# Patient Record
Sex: FEMALE | Race: WHITE | NOT HISPANIC OR LATINO | Employment: FULL TIME | URBAN - METROPOLITAN AREA
[De-identification: names, ages, dates, MRNs, and addresses within clinical notes are randomized per-mention and may not be internally consistent; named-entity substitution may affect disease eponyms.]

---

## 2023-10-19 ENCOUNTER — APPOINTMENT (OUTPATIENT)
Dept: CARDIOLOGY | Facility: CLINIC | Age: 53
End: 2023-10-19
Payer: COMMERCIAL

## 2023-11-16 ENCOUNTER — APPOINTMENT (OUTPATIENT)
Dept: CARDIOLOGY | Facility: CLINIC | Age: 53
End: 2023-11-16
Payer: COMMERCIAL

## 2023-12-22 ENCOUNTER — APPOINTMENT (OUTPATIENT)
Dept: PRIMARY CARE | Facility: CLINIC | Age: 53
End: 2023-12-22
Payer: COMMERCIAL

## 2023-12-28 ENCOUNTER — APPOINTMENT (OUTPATIENT)
Dept: CARDIOLOGY | Facility: CLINIC | Age: 53
End: 2023-12-28
Payer: COMMERCIAL

## 2024-01-02 ENCOUNTER — APPOINTMENT (OUTPATIENT)
Dept: CARDIOLOGY | Facility: CLINIC | Age: 54
End: 2024-01-02
Payer: COMMERCIAL

## 2024-01-25 PROBLEM — E53.8 B12 DEFICIENCY: Status: ACTIVE | Noted: 2024-01-25

## 2024-01-25 PROBLEM — E61.1 IRON DEFICIENCY: Status: ACTIVE | Noted: 2024-01-25

## 2024-01-25 PROBLEM — S69.92XA INJURY OF LEFT WRIST: Status: ACTIVE | Noted: 2024-01-25

## 2024-01-25 PROBLEM — E66.01 MORBID OBESITY (MULTI): Status: ACTIVE | Noted: 2024-01-25

## 2024-01-25 PROBLEM — J20.9 BRONCHITIS, ACUTE: Status: ACTIVE | Noted: 2024-01-25

## 2024-01-25 PROBLEM — U07.1 COVID-19: Status: ACTIVE | Noted: 2024-01-25

## 2024-01-25 PROBLEM — S93.601A RIGHT FOOT SPRAIN: Status: ACTIVE | Noted: 2024-01-25
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
## 2024-01-25 PROBLEM — K91.89 COMPLICATIONS OF GASTRIC BYPASS SURGERY: Status: ACTIVE | Noted: 2024-01-25

## 2024-01-25 PROBLEM — S63.501A RIGHT WRIST SPRAIN: Status: ACTIVE | Noted: 2024-01-25

## 2024-01-25 PROBLEM — R60.0 BILATERAL LEG EDEMA: Status: ACTIVE | Noted: 2024-01-25

## 2024-01-25 PROBLEM — D64.9 ANEMIA: Status: ACTIVE | Noted: 2024-01-25

## 2024-01-25 PROBLEM — R09.89 CAROTID BRUIT: Status: ACTIVE | Noted: 2024-01-25

## 2024-01-25 PROBLEM — R53.83 FATIGUE: Status: ACTIVE | Noted: 2024-01-25

## 2024-01-25 PROBLEM — J30.9 ALLERGIC RHINITIS: Status: ACTIVE | Noted: 2024-01-25
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
## 2024-01-25 PROBLEM — S70.219A: Status: ACTIVE | Noted: 2024-01-25

## 2024-01-25 PROBLEM — I10 ESSENTIAL HYPERTENSION, BENIGN: Status: ACTIVE | Noted: 2024-01-25

## 2024-01-25 RX ORDER — IBUPROFEN 200 MG
200 TABLET ORAL EVERY 6 HOURS PRN
COMMUNITY
End: 2024-02-26 | Stop reason: WASHOUT

## 2024-01-25 RX ORDER — CETIRIZINE HYDROCHLORIDE 10 MG/1
TABLET ORAL
COMMUNITY

## 2024-01-25 RX ORDER — EPINEPHRINE 0.3 MG/.3ML
0.3 INJECTION INTRAMUSCULAR
COMMUNITY
Start: 2015-08-13

## 2024-01-25 RX ORDER — MULTIVITAMIN
TABLET ORAL
COMMUNITY
Start: 2009-10-02

## 2024-01-25 RX ORDER — HYDROCHLOROTHIAZIDE 12.5 MG/1
12.5 TABLET ORAL
COMMUNITY
Start: 2024-01-02 | End: 2024-02-26 | Stop reason: SDUPTHER

## 2024-01-25 RX ORDER — LANOLIN ALCOHOL/MO/W.PET/CERES
CREAM (GRAM) TOPICAL
COMMUNITY

## 2024-01-25 RX ORDER — ZINC SULFATE 50(220)MG
1 CAPSULE ORAL DAILY
COMMUNITY

## 2024-01-25 RX ORDER — VITAMIN B COMPLEX
CAPSULE ORAL
COMMUNITY
End: 2024-02-26 | Stop reason: WASHOUT

## 2024-01-25 RX ORDER — MELOXICAM 15 MG/1
15 TABLET ORAL DAILY
COMMUNITY
Start: 2023-06-20 | End: 2024-02-26 | Stop reason: WASHOUT

## 2024-01-26 ENCOUNTER — APPOINTMENT (OUTPATIENT)
Dept: PRIMARY CARE | Facility: CLINIC | Age: 54
End: 2024-01-26
Payer: COMMERCIAL

## 2024-02-01 ENCOUNTER — APPOINTMENT (OUTPATIENT)
Dept: CARDIOLOGY | Facility: CLINIC | Age: 54
End: 2024-02-01
Payer: COMMERCIAL

## 2024-02-23 RX ORDER — METFORMIN HYDROCHLORIDE 500 MG/1
TABLET ORAL
COMMUNITY
Start: 2024-01-15 | End: 2024-02-26 | Stop reason: WASHOUT

## 2024-02-26 ENCOUNTER — OFFICE VISIT (OUTPATIENT)
Dept: PRIMARY CARE | Facility: CLINIC | Age: 54
End: 2024-02-26
Payer: COMMERCIAL

## 2024-02-26 ENCOUNTER — OFFICE VISIT (OUTPATIENT)
Dept: CARDIOLOGY | Facility: CLINIC | Age: 54
End: 2024-02-26
Payer: COMMERCIAL

## 2024-02-26 VITALS
BODY MASS INDEX: 53.92 KG/M2 | TEMPERATURE: 97.9 F | WEIGHT: 293 LBS | DIASTOLIC BLOOD PRESSURE: 90 MMHG | HEART RATE: 68 BPM | HEIGHT: 62 IN | SYSTOLIC BLOOD PRESSURE: 160 MMHG

## 2024-02-26 VITALS
TEMPERATURE: 97.9 F | BODY MASS INDEX: 51.91 KG/M2 | SYSTOLIC BLOOD PRESSURE: 160 MMHG | DIASTOLIC BLOOD PRESSURE: 90 MMHG | HEIGHT: 63 IN | WEIGHT: 293 LBS

## 2024-02-26 DIAGNOSIS — E55.9 VITAMIN D DEFICIENCY: ICD-10-CM

## 2024-02-26 DIAGNOSIS — Z00.00 ROUTINE GENERAL MEDICAL EXAMINATION AT A HEALTH CARE FACILITY: Primary | ICD-10-CM

## 2024-02-26 DIAGNOSIS — D50.9 IRON DEFICIENCY ANEMIA, UNSPECIFIED IRON DEFICIENCY ANEMIA TYPE: ICD-10-CM

## 2024-02-26 DIAGNOSIS — Z98.84 H/O GASTRIC BYPASS: ICD-10-CM

## 2024-02-26 DIAGNOSIS — R73.9 HYPERGLYCEMIA: ICD-10-CM

## 2024-02-26 DIAGNOSIS — E53.8 VITAMIN B12 DEFICIENCY: ICD-10-CM

## 2024-02-26 DIAGNOSIS — I10 ESSENTIAL HYPERTENSION, BENIGN: ICD-10-CM

## 2024-02-26 DIAGNOSIS — I10 ESSENTIAL HYPERTENSION, BENIGN: Primary | ICD-10-CM

## 2024-02-26 DIAGNOSIS — R74.8 ELEVATED LIVER ENZYMES: ICD-10-CM

## 2024-02-26 PROCEDURE — 99214 OFFICE O/P EST MOD 30 MIN: CPT | Performed by: INTERNAL MEDICINE

## 2024-02-26 PROCEDURE — 3077F SYST BP >= 140 MM HG: CPT | Performed by: FAMILY MEDICINE

## 2024-02-26 PROCEDURE — 1036F TOBACCO NON-USER: CPT | Performed by: FAMILY MEDICINE

## 2024-02-26 PROCEDURE — 99396 PREV VISIT EST AGE 40-64: CPT | Performed by: FAMILY MEDICINE

## 2024-02-26 PROCEDURE — 3080F DIAST BP >= 90 MM HG: CPT | Performed by: FAMILY MEDICINE

## 2024-02-26 PROCEDURE — 3077F SYST BP >= 140 MM HG: CPT | Performed by: INTERNAL MEDICINE

## 2024-02-26 PROCEDURE — 3080F DIAST BP >= 90 MM HG: CPT | Performed by: INTERNAL MEDICINE

## 2024-02-26 PROCEDURE — 1036F TOBACCO NON-USER: CPT | Performed by: INTERNAL MEDICINE

## 2024-02-26 RX ORDER — HYDROCHLOROTHIAZIDE 12.5 MG/1
12.5 TABLET ORAL
Qty: 90 TABLET | Refills: 3 | Status: SHIPPED | OUTPATIENT
Start: 2024-02-26 | End: 2025-02-25

## 2024-02-26 ASSESSMENT — PAIN SCALES - GENERAL: PAINLEVEL: 0-NO PAIN

## 2024-02-26 ASSESSMENT — PATIENT HEALTH QUESTIONNAIRE - PHQ9
1. LITTLE INTEREST OR PLEASURE IN DOING THINGS: NOT AT ALL
SUM OF ALL RESPONSES TO PHQ9 QUESTIONS 1 AND 2: 0
2. FEELING DOWN, DEPRESSED OR HOPELESS: NOT AT ALL
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
## 2024-02-26 NOTE — PROGRESS NOTES
1. Essential hypertension  2. Morbid obesity BMI 66  3. Sinus tachycardia in the setting of severe anemia  4. 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  She has no complaints of orthopnea PND or lower extremity edema    BMI of 68.7, blood pressure today by the /90 mmHg repeat blood pressure by me at 140/94 mmHg  Morbidly obese pleasant female in no acute distress speaking full sentences no carotid bruits upstroke and volumes are normal, heart rate and rhythm are regular grade 3/6 systolic murmur best left upper sternal border lungs decreased breath sound but clear, abdomen is morbidly obese positive bowel sounds soft and nontender and the lower extremities have no edema    No new labs are available    Hypertension with poor control today she is under more stressful life events because of her special needs child, blood pressure previously 136/80 mmHg followed by 118/88 mmHg and today blood pressure today by me at 140/94 mmHg, patient is recommended to continue with her current medication patient was recommended to continue obtaining blood pressure readings at home, and to call the office in 2-3 weeks for follow-up, the salt reduction in the diet was extensively discussed heart healthy diet and weight loss was discussed and recommended patient is to return to my clinic in 3 months for reevaluation.

## 2024-03-29 ENCOUNTER — APPOINTMENT (OUTPATIENT)
Dept: PRIMARY CARE | Facility: CLINIC | Age: 54
End: 2024-03-29
Payer: COMMERCIAL

## 2024-04-22 ENCOUNTER — LAB (OUTPATIENT)
Dept: LAB | Facility: LAB | Age: 54
End: 2024-04-22
Payer: COMMERCIAL

## 2024-04-22 DIAGNOSIS — Z98.84 H/O GASTRIC BYPASS: ICD-10-CM

## 2024-04-22 DIAGNOSIS — R73.9 HYPERGLYCEMIA: ICD-10-CM

## 2024-04-22 DIAGNOSIS — E53.8 VITAMIN B12 DEFICIENCY: ICD-10-CM

## 2024-04-22 DIAGNOSIS — E55.9 VITAMIN D DEFICIENCY: ICD-10-CM

## 2024-04-22 DIAGNOSIS — D50.9 IRON DEFICIENCY ANEMIA, UNSPECIFIED IRON DEFICIENCY ANEMIA TYPE: ICD-10-CM

## 2024-04-22 DIAGNOSIS — I10 ESSENTIAL HYPERTENSION, BENIGN: ICD-10-CM

## 2024-04-22 LAB
25(OH)D3 SERPL-MCNC: 10 NG/ML (ref 30–100)
ALBUMIN SERPL BCP-MCNC: 4.2 G/DL (ref 3.4–5)
ALP SERPL-CCNC: 86 U/L (ref 33–110)
ALT SERPL W P-5'-P-CCNC: 79 U/L (ref 7–45)
ANION GAP SERPL CALC-SCNC: 14 MMOL/L (ref 10–20)
AST SERPL W P-5'-P-CCNC: 35 U/L (ref 9–39)
BASOPHILS # BLD AUTO: 0.04 X10*3/UL (ref 0–0.1)
BASOPHILS NFR BLD AUTO: 0.5 %
BILIRUB SERPL-MCNC: 1.1 MG/DL (ref 0–1.2)
BUN SERPL-MCNC: 19 MG/DL (ref 6–23)
CALCIUM SERPL-MCNC: 9.4 MG/DL (ref 8.6–10.3)
CHLORIDE SERPL-SCNC: 101 MMOL/L (ref 98–107)
CHOLEST SERPL-MCNC: 166 MG/DL (ref 0–199)
CHOLESTEROL/HDL RATIO: 2.6
CO2 SERPL-SCNC: 30 MMOL/L (ref 21–32)
CREAT SERPL-MCNC: 0.54 MG/DL (ref 0.5–1.05)
EGFRCR SERPLBLD CKD-EPI 2021: >90 ML/MIN/1.73M*2
EOSINOPHIL # BLD AUTO: 0.1 X10*3/UL (ref 0–0.7)
EOSINOPHIL NFR BLD AUTO: 1.3 %
ERYTHROCYTE [DISTWIDTH] IN BLOOD BY AUTOMATED COUNT: 13.4 % (ref 11.5–14.5)
EST. AVERAGE GLUCOSE BLD GHB EST-MCNC: 94 MG/DL
FERRITIN SERPL-MCNC: 66 NG/ML (ref 8–150)
GLUCOSE SERPL-MCNC: 97 MG/DL (ref 74–99)
HBA1C MFR BLD: 4.9 %
HCT VFR BLD AUTO: 45.6 % (ref 36–46)
HDLC SERPL-MCNC: 63.6 MG/DL
HGB BLD-MCNC: 15.3 G/DL (ref 12–16)
IMM GRANULOCYTES # BLD AUTO: 0.03 X10*3/UL (ref 0–0.7)
IMM GRANULOCYTES NFR BLD AUTO: 0.4 % (ref 0–0.9)
INSULIN SERPL-ACNC: 14 UIU/ML (ref 3–25)
IRON SATN MFR SERPL: 29 % (ref 25–45)
IRON SERPL-MCNC: 132 UG/DL (ref 35–150)
LDLC SERPL CALC-MCNC: 87 MG/DL
LYMPHOCYTES # BLD AUTO: 3.11 X10*3/UL (ref 1.2–4.8)
LYMPHOCYTES NFR BLD AUTO: 40 %
MCH RBC QN AUTO: 31.9 PG (ref 26–34)
MCHC RBC AUTO-ENTMCNC: 33.6 G/DL (ref 32–36)
MCV RBC AUTO: 95 FL (ref 80–100)
MONOCYTES # BLD AUTO: 0.57 X10*3/UL (ref 0.1–1)
MONOCYTES NFR BLD AUTO: 7.3 %
NEUTROPHILS # BLD AUTO: 3.92 X10*3/UL (ref 1.2–7.7)
NEUTROPHILS NFR BLD AUTO: 50.5 %
NON HDL CHOLESTEROL: 102 MG/DL (ref 0–149)
NRBC BLD-RTO: 0 /100 WBCS (ref 0–0)
PLATELET # BLD AUTO: 259 X10*3/UL (ref 150–450)
POTASSIUM SERPL-SCNC: 3.8 MMOL/L (ref 3.5–5.3)
PROT SERPL-MCNC: 7.2 G/DL (ref 6.4–8.2)
RBC # BLD AUTO: 4.79 X10*6/UL (ref 4–5.2)
SODIUM SERPL-SCNC: 141 MMOL/L (ref 136–145)
TIBC SERPL-MCNC: 459 UG/DL (ref 240–445)
TRIGL SERPL-MCNC: 77 MG/DL (ref 0–149)
TSH SERPL-ACNC: 2.89 MIU/L (ref 0.44–3.98)
UIBC SERPL-MCNC: 327 UG/DL (ref 110–370)
VIT B12 SERPL-MCNC: 662 PG/ML (ref 211–911)
VLDL: 15 MG/DL (ref 0–40)
WBC # BLD AUTO: 7.8 X10*3/UL (ref 4.4–11.3)

## 2024-04-22 PROCEDURE — 83525 ASSAY OF INSULIN: CPT

## 2024-04-22 PROCEDURE — 82306 VITAMIN D 25 HYDROXY: CPT

## 2024-04-22 PROCEDURE — 80061 LIPID PANEL: CPT

## 2024-04-22 PROCEDURE — 82607 VITAMIN B-12: CPT

## 2024-04-22 PROCEDURE — 83036 HEMOGLOBIN GLYCOSYLATED A1C: CPT

## 2024-04-22 PROCEDURE — 36415 COLL VENOUS BLD VENIPUNCTURE: CPT

## 2024-04-22 PROCEDURE — 83550 IRON BINDING TEST: CPT

## 2024-04-22 PROCEDURE — 80053 COMPREHEN METABOLIC PANEL: CPT

## 2024-04-22 PROCEDURE — 84443 ASSAY THYROID STIM HORMONE: CPT

## 2024-04-22 PROCEDURE — 83540 ASSAY OF IRON: CPT

## 2024-04-22 PROCEDURE — 85025 COMPLETE CBC W/AUTO DIFF WBC: CPT

## 2024-04-22 PROCEDURE — 82728 ASSAY OF FERRITIN: CPT

## 2024-04-26 ENCOUNTER — APPOINTMENT (OUTPATIENT)
Dept: PRIMARY CARE | Facility: CLINIC | Age: 54
End: 2024-04-26
Payer: COMMERCIAL

## 2024-05-14 PROBLEM — R74.01 TRANSAMINITIS: Status: ACTIVE | Noted: 2024-05-14

## 2024-05-14 PROBLEM — R01.1 HEART MURMUR: Status: ACTIVE | Noted: 2024-05-14

## 2024-05-17 ENCOUNTER — OFFICE VISIT (OUTPATIENT)
Dept: PRIMARY CARE | Facility: CLINIC | Age: 54
End: 2024-05-17
Payer: COMMERCIAL

## 2024-05-17 VITALS
TEMPERATURE: 97.9 F | DIASTOLIC BLOOD PRESSURE: 82 MMHG | WEIGHT: 293 LBS | SYSTOLIC BLOOD PRESSURE: 158 MMHG | HEIGHT: 62 IN | BODY MASS INDEX: 53.92 KG/M2 | HEART RATE: 94 BPM

## 2024-05-17 DIAGNOSIS — Z12.11 ENCOUNTER FOR SCREENING FOR MALIGNANT NEOPLASM OF COLON: ICD-10-CM

## 2024-05-17 DIAGNOSIS — I10 ESSENTIAL HYPERTENSION, BENIGN: Primary | ICD-10-CM

## 2024-05-17 DIAGNOSIS — E55.9 VITAMIN D DEFICIENCY: ICD-10-CM

## 2024-05-17 PROCEDURE — 99213 OFFICE O/P EST LOW 20 MIN: CPT | Performed by: FAMILY MEDICINE

## 2024-05-17 PROCEDURE — 1036F TOBACCO NON-USER: CPT | Performed by: FAMILY MEDICINE

## 2024-05-17 PROCEDURE — 3079F DIAST BP 80-89 MM HG: CPT | Performed by: FAMILY MEDICINE

## 2024-05-17 PROCEDURE — 3077F SYST BP >= 140 MM HG: CPT | Performed by: FAMILY MEDICINE
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
## 2024-05-29 ENCOUNTER — APPOINTMENT (OUTPATIENT)
Dept: CARDIOLOGY | Facility: CLINIC | Age: 54
End: 2024-05-29
Payer: COMMERCIAL

## 2024-06-12 ENCOUNTER — APPOINTMENT (OUTPATIENT)
Dept: CARDIOLOGY | Facility: CLINIC | Age: 54
End: 2024-06-12
Payer: COMMERCIAL

## 2024-06-13 ENCOUNTER — APPOINTMENT (OUTPATIENT)
Dept: CARDIOLOGY | Facility: CLINIC | Age: 54
End: 2024-06-13
Payer: COMMERCIAL

## 2024-07-18 ENCOUNTER — APPOINTMENT (OUTPATIENT)
Dept: CARDIOLOGY | Facility: CLINIC | Age: 54
End: 2024-07-18
Payer: COMMERCIAL

## 2024-07-31 ENCOUNTER — APPOINTMENT (OUTPATIENT)
Dept: CARDIOLOGY | Facility: CLINIC | Age: 54
End: 2024-07-31
Payer: COMMERCIAL

## 2024-09-10 DIAGNOSIS — Z12.31 ENCOUNTER FOR SCREENING MAMMOGRAM FOR BREAST CANCER: ICD-10-CM

## 2024-09-14 ENCOUNTER — APPOINTMENT (OUTPATIENT)
Dept: CARDIOLOGY | Facility: HOSPITAL | Age: 54
DRG: 493 | End: 2024-09-14
Payer: COMMERCIAL

## 2024-09-14 ENCOUNTER — APPOINTMENT (OUTPATIENT)
Dept: RADIOLOGY | Facility: HOSPITAL | Age: 54
DRG: 493 | End: 2024-09-14
Payer: COMMERCIAL

## 2024-09-14 ENCOUNTER — HOSPITAL ENCOUNTER (INPATIENT)
Facility: HOSPITAL | Age: 54
DRG: 493 | End: 2024-09-14
Attending: EMERGENCY MEDICINE | Admitting: INTERNAL MEDICINE
Payer: COMMERCIAL

## 2024-09-14 DIAGNOSIS — S82.891A CLOSED FRACTURE OF RIGHT ANKLE, INITIAL ENCOUNTER: Primary | ICD-10-CM

## 2024-09-14 DIAGNOSIS — I10 PRIMARY HYPERTENSION: ICD-10-CM

## 2024-09-14 DIAGNOSIS — I10 ESSENTIAL HYPERTENSION, BENIGN: ICD-10-CM

## 2024-09-14 LAB
ABO GROUP (TYPE) IN BLOOD: NORMAL
ABO GROUP (TYPE) IN BLOOD: NORMAL
ALBUMIN SERPL BCP-MCNC: 4 G/DL (ref 3.4–5)
ALP SERPL-CCNC: 104 U/L (ref 33–110)
ALT SERPL W P-5'-P-CCNC: 109 U/L (ref 7–45)
ANION GAP SERPL CALC-SCNC: 20 MMOL/L (ref 10–20)
ANTIBODY SCREEN: NORMAL
APTT PPP: 29 SECONDS (ref 27–38)
AST SERPL W P-5'-P-CCNC: 86 U/L (ref 9–39)
B-HCG SERPL-ACNC: 2 MIU/ML
BASOPHILS # BLD AUTO: 0.03 X10*3/UL (ref 0–0.1)
BASOPHILS NFR BLD AUTO: 0.3 %
BILIRUB SERPL-MCNC: 1.1 MG/DL (ref 0–1.2)
BUN SERPL-MCNC: 10 MG/DL (ref 6–23)
CALCIUM SERPL-MCNC: 8.6 MG/DL (ref 8.6–10.3)
CHLORIDE SERPL-SCNC: 98 MMOL/L (ref 98–107)
CO2 SERPL-SCNC: 22 MMOL/L (ref 21–32)
CREAT SERPL-MCNC: 0.54 MG/DL (ref 0.5–1.05)
EGFRCR SERPLBLD CKD-EPI 2021: >90 ML/MIN/1.73M*2
EOSINOPHIL # BLD AUTO: 0.01 X10*3/UL (ref 0–0.7)
EOSINOPHIL NFR BLD AUTO: 0.1 %
ERYTHROCYTE [DISTWIDTH] IN BLOOD BY AUTOMATED COUNT: 13.4 % (ref 11.5–14.5)
GLUCOSE SERPL-MCNC: 127 MG/DL (ref 74–99)
HCT VFR BLD AUTO: 40.1 % (ref 36–46)
HGB BLD-MCNC: 14 G/DL (ref 12–16)
IMM GRANULOCYTES # BLD AUTO: 0.02 X10*3/UL (ref 0–0.7)
IMM GRANULOCYTES NFR BLD AUTO: 0.2 % (ref 0–0.9)
INR PPP: 1.1 (ref 0.9–1.1)
LYMPHOCYTES # BLD AUTO: 1 X10*3/UL (ref 1.2–4.8)
LYMPHOCYTES NFR BLD AUTO: 9.9 %
MCH RBC QN AUTO: 32.5 PG (ref 26–34)
MCHC RBC AUTO-ENTMCNC: 34.9 G/DL (ref 32–36)
MCV RBC AUTO: 93 FL (ref 80–100)
MONOCYTES # BLD AUTO: 0.9 X10*3/UL (ref 0.1–1)
MONOCYTES NFR BLD AUTO: 8.9 %
NEUTROPHILS # BLD AUTO: 8.13 X10*3/UL (ref 1.2–7.7)
NEUTROPHILS NFR BLD AUTO: 80.6 %
NRBC BLD-RTO: 0 /100 WBCS (ref 0–0)
PLATELET # BLD AUTO: 210 X10*3/UL (ref 150–450)
POTASSIUM SERPL-SCNC: 3.4 MMOL/L (ref 3.5–5.3)
PROT SERPL-MCNC: 7.4 G/DL (ref 6.4–8.2)
PROTHROMBIN TIME: 12.9 SECONDS (ref 9.8–12.8)
RBC # BLD AUTO: 4.31 X10*6/UL (ref 4–5.2)
RH FACTOR (ANTIGEN D): NORMAL
RH FACTOR (ANTIGEN D): NORMAL
SODIUM SERPL-SCNC: 137 MMOL/L (ref 136–145)
WBC # BLD AUTO: 10.1 X10*3/UL (ref 4.4–11.3)

## 2024-09-14 PROCEDURE — 96365 THER/PROPH/DIAG IV INF INIT: CPT | Mod: 59

## 2024-09-14 PROCEDURE — 71045 X-RAY EXAM CHEST 1 VIEW: CPT | Performed by: RADIOLOGY

## 2024-09-14 PROCEDURE — 2500000004 HC RX 250 GENERAL PHARMACY W/ HCPCS (ALT 636 FOR OP/ED)

## 2024-09-14 PROCEDURE — 36415 COLL VENOUS BLD VENIPUNCTURE: CPT

## 2024-09-14 PROCEDURE — 73610 X-RAY EXAM OF ANKLE: CPT | Mod: RT

## 2024-09-14 PROCEDURE — 90471 IMMUNIZATION ADMIN: CPT | Performed by: EMERGENCY MEDICINE

## 2024-09-14 PROCEDURE — 84702 CHORIONIC GONADOTROPIN TEST: CPT

## 2024-09-14 PROCEDURE — 85025 COMPLETE CBC W/AUTO DIFF WBC: CPT

## 2024-09-14 PROCEDURE — 1100000001 HC PRIVATE ROOM DAILY

## 2024-09-14 PROCEDURE — 29515 APPLICATION SHORT LEG SPLINT: CPT

## 2024-09-14 PROCEDURE — 99285 EMERGENCY DEPT VISIT HI MDM: CPT | Mod: 25

## 2024-09-14 PROCEDURE — 80053 COMPREHEN METABOLIC PANEL: CPT

## 2024-09-14 PROCEDURE — 2500000004 HC RX 250 GENERAL PHARMACY W/ HCPCS (ALT 636 FOR OP/ED): Performed by: INTERNAL MEDICINE

## 2024-09-14 PROCEDURE — 71045 X-RAY EXAM CHEST 1 VIEW: CPT

## 2024-09-14 PROCEDURE — 90715 TDAP VACCINE 7 YRS/> IM: CPT | Performed by: EMERGENCY MEDICINE

## 2024-09-14 PROCEDURE — 73610 X-RAY EXAM OF ANKLE: CPT | Mod: RIGHT SIDE | Performed by: RADIOLOGY

## 2024-09-14 PROCEDURE — 2500000004 HC RX 250 GENERAL PHARMACY W/ HCPCS (ALT 636 FOR OP/ED): Performed by: EMERGENCY MEDICINE

## 2024-09-14 PROCEDURE — 86901 BLOOD TYPING SEROLOGIC RH(D): CPT

## 2024-09-14 PROCEDURE — 85730 THROMBOPLASTIN TIME PARTIAL: CPT

## 2024-09-14 PROCEDURE — 99223 1ST HOSP IP/OBS HIGH 75: CPT | Performed by: INTERNAL MEDICINE

## 2024-09-14 PROCEDURE — 93005 ELECTROCARDIOGRAM TRACING: CPT

## 2024-09-14 PROCEDURE — 85610 PROTHROMBIN TIME: CPT

## 2024-09-14 PROCEDURE — 96375 TX/PRO/DX INJ NEW DRUG ADDON: CPT | Mod: 59

## 2024-09-14 RX ORDER — GUAIFENESIN 600 MG/1
600 TABLET, EXTENDED RELEASE ORAL EVERY 12 HOURS PRN
Status: DISCONTINUED | OUTPATIENT
Start: 2024-09-14 | End: 2024-09-26 | Stop reason: HOSPADM

## 2024-09-14 RX ORDER — MORPHINE SULFATE 4 MG/ML
4 INJECTION, SOLUTION INTRAMUSCULAR; INTRAVENOUS EVERY 4 HOURS PRN
Status: DISCONTINUED | OUTPATIENT
Start: 2024-09-14 | End: 2024-09-15

## 2024-09-14 RX ORDER — MORPHINE SULFATE 2 MG/ML
2 INJECTION, SOLUTION INTRAMUSCULAR; INTRAVENOUS EVERY 4 HOURS PRN
Status: DISCONTINUED | OUTPATIENT
Start: 2024-09-14 | End: 2024-09-14

## 2024-09-14 RX ORDER — ENOXAPARIN SODIUM 100 MG/ML
60 INJECTION SUBCUTANEOUS EVERY 12 HOURS SCHEDULED
Status: DISCONTINUED | OUTPATIENT
Start: 2024-09-14 | End: 2024-09-26 | Stop reason: HOSPADM

## 2024-09-14 RX ORDER — ONDANSETRON HYDROCHLORIDE 2 MG/ML
4 INJECTION, SOLUTION INTRAVENOUS EVERY 6 HOURS PRN
Status: DISCONTINUED | OUTPATIENT
Start: 2024-09-14 | End: 2024-09-26 | Stop reason: HOSPADM

## 2024-09-14 RX ORDER — LIDOCAINE 560 MG/1
1 PATCH PERCUTANEOUS; TOPICAL; TRANSDERMAL DAILY
Status: DISCONTINUED | OUTPATIENT
Start: 2024-09-15 | End: 2024-09-26 | Stop reason: HOSPADM

## 2024-09-14 RX ORDER — CEPHRADINE 500 MG
1 CAPSULE ORAL EVERY OTHER DAY
COMMUNITY

## 2024-09-14 RX ORDER — MORPHINE SULFATE 2 MG/ML
2 INJECTION, SOLUTION INTRAMUSCULAR; INTRAVENOUS EVERY 4 HOURS PRN
Status: DISCONTINUED | OUTPATIENT
Start: 2024-09-14 | End: 2024-09-15

## 2024-09-14 RX ORDER — POLYETHYLENE GLYCOL 3350 17 G/17G
17 POWDER, FOR SOLUTION ORAL DAILY PRN
Status: DISCONTINUED | OUTPATIENT
Start: 2024-09-14 | End: 2024-09-26 | Stop reason: HOSPADM

## 2024-09-14 RX ORDER — MORPHINE SULFATE 4 MG/ML
4 INJECTION, SOLUTION INTRAMUSCULAR; INTRAVENOUS ONCE
Status: COMPLETED | OUTPATIENT
Start: 2024-09-14 | End: 2024-09-14

## 2024-09-14 RX ORDER — GUAIFENESIN AND PHENYLEPHRINE HCL 400; 10 MG/1; MG/1
1 TABLET ORAL 2 TIMES WEEKLY
COMMUNITY

## 2024-09-14 RX ORDER — TALC
3 POWDER (GRAM) TOPICAL NIGHTLY PRN
Status: DISCONTINUED | OUTPATIENT
Start: 2024-09-14 | End: 2024-09-26 | Stop reason: HOSPADM

## 2024-09-14 RX ORDER — HYDROMORPHONE HYDROCHLORIDE 1 MG/ML
1 INJECTION, SOLUTION INTRAMUSCULAR; INTRAVENOUS; SUBCUTANEOUS ONCE
Status: COMPLETED | OUTPATIENT
Start: 2024-09-14 | End: 2024-09-14

## 2024-09-14 RX ORDER — ACETAMINOPHEN 325 MG/1
650 TABLET ORAL EVERY 6 HOURS PRN
Status: DISCONTINUED | OUTPATIENT
Start: 2024-09-14 | End: 2024-09-26 | Stop reason: HOSPADM

## 2024-09-14 RX ORDER — CEFAZOLIN SODIUM 1 G/50ML
2 SOLUTION INTRAVENOUS ONCE
Status: COMPLETED | OUTPATIENT
Start: 2024-09-14 | End: 2024-09-14

## 2024-09-14 RX ORDER — VITAMIN B COMPLEX
1 CAPSULE ORAL DAILY
COMMUNITY

## 2024-09-14 RX ORDER — HYDROCHLOROTHIAZIDE 12.5 MG/1
12.5 TABLET ORAL
Status: DISCONTINUED | OUTPATIENT
Start: 2024-09-15 | End: 2024-09-18

## 2024-09-14 RX ADMIN — TETANUS TOXOID, REDUCED DIPHTHERIA TOXOID AND ACELLULAR PERTUSSIS VACCINE, ADSORBED 0.5 ML: 5; 2.5; 8; 8; 2.5 SUSPENSION INTRAMUSCULAR at 13:40

## 2024-09-14 RX ADMIN — MORPHINE SULFATE 4 MG: 4 INJECTION, SOLUTION INTRAMUSCULAR; INTRAVENOUS at 13:39

## 2024-09-14 RX ADMIN — ENOXAPARIN SODIUM 60 MG: 60 INJECTION SUBCUTANEOUS at 20:51

## 2024-09-14 RX ADMIN — MORPHINE SULFATE 2 MG: 2 INJECTION, SOLUTION INTRAMUSCULAR; INTRAVENOUS at 18:09

## 2024-09-14 RX ADMIN — HYDROMORPHONE HYDROCHLORIDE 1 MG: 1 INJECTION, SOLUTION INTRAMUSCULAR; INTRAVENOUS; SUBCUTANEOUS at 14:19

## 2024-09-14 RX ADMIN — CEFAZOLIN SODIUM 2 G: 1 INJECTION, SOLUTION INTRAVENOUS at 13:24

## 2024-09-14 RX ADMIN — HYDROMORPHONE HYDROCHLORIDE 0.4 MG: 1 INJECTION, SOLUTION INTRAMUSCULAR; INTRAVENOUS; SUBCUTANEOUS at 20:51

## 2024-09-14 RX ADMIN — MORPHINE SULFATE 4 MG: 4 INJECTION, SOLUTION INTRAMUSCULAR; INTRAVENOUS at 23:05

## 2024-09-14 SDOH — SOCIAL STABILITY: SOCIAL INSECURITY: ARE YOU OR HAVE YOU BEEN THREATENED OR ABUSED PHYSICALLY, EMOTIONALLY, OR SEXUALLY BY ANYONE?: NO

## 2024-09-14 SDOH — SOCIAL STABILITY: SOCIAL INSECURITY: WERE YOU ABLE TO COMPLETE ALL THE BEHAVIORAL HEALTH SCREENINGS?: YES

## 2024-09-14 SDOH — SOCIAL STABILITY: SOCIAL INSECURITY: HAVE YOU HAD ANY THOUGHTS OF HARMING ANYONE ELSE?: NO

## 2024-09-14 SDOH — SOCIAL STABILITY: SOCIAL INSECURITY: HAS ANYONE EVER THREATENED TO HURT YOUR FAMILY OR YOUR PETS?: NO

## 2024-09-14 SDOH — SOCIAL STABILITY: SOCIAL INSECURITY: HAVE YOU HAD THOUGHTS OF HARMING ANYONE ELSE?: NO

## 2024-09-14 SDOH — SOCIAL STABILITY: SOCIAL INSECURITY: ARE THERE ANY APPARENT SIGNS OF INJURIES/BEHAVIORS THAT COULD BE RELATED TO ABUSE/NEGLECT?: NO

## 2024-09-14 SDOH — SOCIAL STABILITY: SOCIAL INSECURITY: DOES ANYONE TRY TO KEEP YOU FROM HAVING/CONTACTING OTHER FRIENDS OR DOING THINGS OUTSIDE YOUR HOME?: NO

## 2024-09-14 SDOH — SOCIAL STABILITY: SOCIAL INSECURITY: DO YOU FEEL UNSAFE GOING BACK TO THE PLACE WHERE YOU ARE LIVING?: NO

## 2024-09-14 SDOH — SOCIAL STABILITY: SOCIAL INSECURITY: ABUSE: ADULT

## 2024-09-14 SDOH — SOCIAL STABILITY: SOCIAL INSECURITY: DO YOU FEEL ANYONE HAS EXPLOITED OR TAKEN ADVANTAGE OF YOU FINANCIALLY OR OF YOUR PERSONAL PROPERTY?: NO

## 2024-09-14 ASSESSMENT — ACTIVITIES OF DAILY LIVING (ADL)
FEEDING YOURSELF: INDEPENDENT
WALKS IN HOME: INDEPENDENT
DRESSING YOURSELF: INDEPENDENT
JUDGMENT_ADEQUATE_SAFELY_COMPLETE_DAILY_ACTIVITIES: YES
HEARING - LEFT EAR: FUNCTIONAL
HEARING - RIGHT EAR: FUNCTIONAL
PATIENT'S MEMORY ADEQUATE TO SAFELY COMPLETE DAILY ACTIVITIES?: YES
BATHING: INDEPENDENT
TOILETING: NEEDS ASSISTANCE
GROOMING: INDEPENDENT
ADEQUATE_TO_COMPLETE_ADL: YES
LACK_OF_TRANSPORTATION: NO

## 2024-09-14 ASSESSMENT — COGNITIVE AND FUNCTIONAL STATUS - GENERAL
TOILETING: A LITTLE
CLIMB 3 TO 5 STEPS WITH RAILING: A LOT
HELP NEEDED FOR BATHING: A LITTLE
WALKING IN HOSPITAL ROOM: A LOT
TURNING FROM BACK TO SIDE WHILE IN FLAT BAD: A LOT
PATIENT BASELINE BEDBOUND: NO
MOVING FROM LYING ON BACK TO SITTING ON SIDE OF FLAT BED WITH BEDRAILS: A LOT
DAILY ACTIVITIY SCORE: 20
MOBILITY SCORE: 12
MOVING TO AND FROM BED TO CHAIR: A LOT
STANDING UP FROM CHAIR USING ARMS: A LOT
DRESSING REGULAR UPPER BODY CLOTHING: A LITTLE
DRESSING REGULAR LOWER BODY CLOTHING: A LITTLE

## 2024-09-14 ASSESSMENT — PAIN - FUNCTIONAL ASSESSMENT
PAIN_FUNCTIONAL_ASSESSMENT: 0-10
PAIN_FUNCTIONAL_ASSESSMENT: 0-10

## 2024-09-14 ASSESSMENT — LIFESTYLE VARIABLES
AUDIT-C TOTAL SCORE: 1
SKIP TO QUESTIONS 9-10: 1
AUDIT-C TOTAL SCORE: 1
HOW MANY STANDARD DRINKS CONTAINING ALCOHOL DO YOU HAVE ON A TYPICAL DAY: 1 OR 2
HOW OFTEN DO YOU HAVE A DRINK CONTAINING ALCOHOL: MONTHLY OR LESS
HOW OFTEN DO YOU HAVE 6 OR MORE DRINKS ON ONE OCCASION: NEVER

## 2024-09-14 ASSESSMENT — PATIENT HEALTH QUESTIONNAIRE - PHQ9
2. FEELING DOWN, DEPRESSED OR HOPELESS: NOT AT ALL
1. LITTLE INTEREST OR PLEASURE IN DOING THINGS: NOT AT ALL
SUM OF ALL RESPONSES TO PHQ9 QUESTIONS 1 & 2: 0

## 2024-09-14 ASSESSMENT — PAIN DESCRIPTION - ORIENTATION
ORIENTATION: RIGHT
ORIENTATION: RIGHT

## 2024-09-14 ASSESSMENT — PAIN SCALES - GENERAL
PAINLEVEL_OUTOF10: 4
PAINLEVEL_OUTOF10: 8
PAINLEVEL_OUTOF10: 7
PAINLEVEL_OUTOF10: 7
PAINLEVEL_OUTOF10: 10 - WORST POSSIBLE PAIN
PAINLEVEL_OUTOF10: 6
PAINLEVEL_OUTOF10: 8
PAINLEVEL_OUTOF10: 5 - MODERATE PAIN

## 2024-09-14 ASSESSMENT — COLUMBIA-SUICIDE SEVERITY RATING SCALE - C-SSRS
1. IN THE PAST MONTH, HAVE YOU WISHED YOU WERE DEAD OR WISHED YOU COULD GO TO SLEEP AND NOT WAKE UP?: NO
6. HAVE YOU EVER DONE ANYTHING, STARTED TO DO ANYTHING, OR PREPARED TO DO ANYTHING TO END YOUR LIFE?: NO
2. HAVE YOU ACTUALLY HAD ANY THOUGHTS OF KILLING YOURSELF?: NO

## 2024-09-14 ASSESSMENT — PAIN DESCRIPTION - LOCATION
LOCATION: FOOT
LOCATION: ANKLE
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
## 2024-09-14 NOTE — ED TRIAGE NOTES
Patient BIBA from home due to a mechanical fall with right ankle, swelling bruising and pain. EMS administered fentanyl 100MCG in route.

## 2024-09-14 NOTE — ED PROVIDER NOTES
EMERGENCY DEPARTMENT ENCOUNTER      Pt Name: Stephanie Alatorre  MRN: [id number]  Birthdate 1970  Date of evaluation: 9/14/2024  Provider: Vince Sanchez DO    CHIEF COMPLAINT       Chief Complaint   Patient presents with    Fall         HISTORY OF PRESENT ILLNESS    Patient is a 53-year-old female with past medical history significant for hypertension as well as anemia presenting today with a chief complaint of right ankle injury.  States that last night around 1:30 AM she was running through her house and attempt to stop her daughter from leaving the house when she rolled the ankle.  She is not sure if it was inverted or everted.  She has not been able to bear weight on it since.  Patient was brought in via EMS, received 2 doses of 50 mcg of fentanyl.  Denies any other injuries.  She did not hit her head.  She did not lose consciousness.          Nursing Notes were reviewed.    PAST MEDICAL HISTORY     Past Medical History:   Diagnosis Date    Personal history of other (healed) physical injury and trauma 10/09/2015    History of sprain of ankle         SURGICAL HISTORY       Past Surgical History:   Procedure Laterality Date    OTHER SURGICAL HISTORY  04/07/2022    Cholecystectomy    OTHER SURGICAL HISTORY  04/07/2022    Kamila-en-Y gastric bypass         CURRENT MEDICATIONS       Previous Medications    CETIRIZINE (ZYRTEC) 10 MG TABLET    Take by mouth.    CHOLECALCIFEROL, VITD3,/VIT K2 (D3 PLUS K2 DOTS ORAL)    Take by mouth.    CYANOCOBALAMIN (VITAMIN B-12) 1,000 MCG TABLET    Take by mouth.    EPINEPHRINE (EPIPEN 2-KRISTIN) 0.3 MG/0.3 ML INJECTION SYRINGE    Inject 0.3 mL (0.3 mg) into the muscle. As Directed    HYDROCHLOROTHIAZIDE (MICROZIDE) 12.5 MG TABLET    Take 1 tablet (12.5 mg) by mouth once daily in the morning. Take before meals.    MULTIVITAMIN (DAILY MULTI-VITAMIN) TABLET    Take by mouth.    ZINC SULFATE (ZINCATE) 220 (50 ZN) MG CAPSULE    Take 1 capsule (50 mg of elemental zinc) by mouth once daily.        ALLERGIES     Benadryl decongestant, Flu vac qs 2015 (18-64yrs)(pf), Other, and Diphenhydramine hcl    FAMILY HISTORY       Family History   Problem Relation Name Age of Onset    Other (arteriosclerotic cardiovascular) Mother      Other (Other) Sister            SOCIAL HISTORY       Social History     Socioeconomic History    Marital status: Single   Tobacco Use    Smoking status: Never     Passive exposure: Never    Smokeless tobacco: Never   Substance and Sexual Activity    Alcohol use: Not Currently    Drug use: Not Currently    Sexual activity: Not Currently       SCREENINGS                        PHYSICAL EXAM    (up to 7 for level 4, 8 or more for level 5)     ED Triage Vitals   Temp Pulse Resp BP   -- -- -- --      SpO2 Temp src Heart Rate Source Patient Position   -- -- -- --      BP Location FiO2 (%)     -- --       Physical Exam  Vitals and nursing note reviewed.   Constitutional:       General: She is not in acute distress.     Appearance: She is obese. She is not ill-appearing.   HENT:      Head: Normocephalic and atraumatic.      Right Ear: External ear normal.      Left Ear: External ear normal.   Pulmonary:      Effort: No respiratory distress.   Musculoskeletal:         General: No signs of injury.      Comments: There is a significant mount of ecchymosis surrounding the right ankle.  There is a small area measuring 3 to 4 mm in the anterior aspect of the ankle concerning for potential open fracture.  2+ DP pulses.  Sensation is intact to light touch.  Range of motion testing deferred secondary to patient discomfort.   Skin:     Coloration: Skin is not jaundiced or pale.   Neurological:      General: No focal deficit present.      Mental Status: She is alert.   Psychiatric:         Mood and Affect: Mood normal.         Behavior: Behavior normal.          DIAGNOSTIC RESULTS     LABS:  Labs Reviewed   CBC WITH AUTO DIFFERENTIAL - Abnormal       Result Value    WBC 10.1      nRBC 0.0      RBC 4.31       Hemoglobin 14.0      Hematocrit 40.1      MCV 93      MCH 32.5      MCHC 34.9      RDW 13.4      Platelets 210      Neutrophils % 80.6      Immature Granulocytes %, Automated 0.2      Lymphocytes % 9.9      Monocytes % 8.9      Eosinophils % 0.1      Basophils % 0.3      Neutrophils Absolute 8.13 (*)     Immature Granulocytes Absolute, Automated 0.02      Lymphocytes Absolute 1.00 (*)     Monocytes Absolute 0.90      Eosinophils Absolute 0.01      Basophils Absolute 0.03     COMPREHENSIVE METABOLIC PANEL - Abnormal    Glucose 127 (*)     Sodium 137      Potassium 3.4 (*)     Chloride 98      Bicarbonate 22      Anion Gap 20      Urea Nitrogen 10      Creatinine 0.54      eGFR >90      Calcium 8.6      Albumin 4.0      Alkaline Phosphatase 104      Total Protein 7.4      AST 86 (*)     Bilirubin, Total 1.1       (*)    PROTIME-INR - Abnormal    Protime 12.9 (*)     INR 1.1     APTT - Normal    aPTT 29      Narrative:     The APTT is no longer used for monitoring Unfractionated Heparin Therapy. For monitoring Heparin Therapy, use the Heparin Assay.   HUMAN CHORIONIC GONADOTROPIN, SERUM QUANTITATIVE - Normal    HCG, Beta-Quantitative 2      Narrative:      Total HCG measurement is performed using the [employer] [place] Access   Immunoassay which detects intact HCG and free beta HCG subunit.    This test is not indicated for use as a tumor marker.   HCG testing is performed using a different test methodology at [facility] than other [facility]. Direct result comparison   should only be made within the same method.       TYPE AND SCREEN    ABO TYPE O      Rh TYPE NEG      ANTIBODY SCREEN NEG      Narrative:     Review your Rh Negative female patient's potential need for Rh Immune Globulin (RhIg)administration.   VERAB/VERIFY ABORH       All other labs were within normal range or not returned as of this dictation.    Imaging  XR ankle right 3+ views   Final Result   1. Oblique  transsyndesmotic fracture of the distal fibula.   2. Possible intra-articular fracturing of the posterior malleolus of   the tibia.   3. Incongruent ankle mortise with widening of the distal tibiofibular   syndesmosis and medial gutter of the ankle joint.   4. CT may be helpful for further characterization when the patient's   clinical condition allows.        MACRO:   None        Signed by: Christ Mclean 9/14/2024 1:40 PM   Dictation workstation:   [id number]      XR chest 1 view   Final Result   No acute cardiopulmonary process is evident.        MACRO:   None        Signed by: Christ Mclean 9/14/2024 1:32 PM   Dictation workstation:   [id number]      XR ankle right 3+ views    (Results Pending)        Procedures  Procedures     EMERGENCY DEPARTMENT COURSE/MDM:   Medical Decision Making  53-year-old female presenting today via EMS after she sustained a mechanical fall yesterday resulting in a right ankle injury.  She is not sure of the exact mechanism.  She has been unable to ambulate on the ankle since.  On exam there is a significant amount of ecchymosis and concern for potential open fracture with a small area of broken skin over the ankle.  X-rays of the ankle were ordered to evaluate for evidence of fracture.  Ancef was given for prophylaxis against potential open fracture.  Basic labs were also ordered in preparation for potential surgical intervention.  Patient did receive 2 doses of 50 mcg of fentanyl prior to arrival.  Pain is well-controlled at this time.         Please see ED course for additional MDM.    ED Course as of 09/14/24 1436   Sat Sep 14, 2024   1320 EKG at 1 3/1 to 9/14/2024 shows sinus tachycardia with PACs, rate of 111, parable 142, QTc 44.  No acute ischemic changes.  Normal axis.  Independently interpreted by the resident physician [CL]   1339 Chest x-ray unremarkable [CL]   1342 X-ray of the ankle with disc fibular fracture as well as a possible intra-articular fracturing of the  posterior malleolus of the tibia widening of the distal tibiofibular syndesmosis and medial gutter of the ankle joint. [CL]   1343 Page placed to discuss the patient with orthopedic surgery. [CL]   1436 Patient was reduced at bedside after patient received pain medication.  Postreduction x-rays pending at this time.  Patient was discussed with hospitalist, Dr. Sepulveda, who agrees to accept patient for admission. [CL]      ED Course User Index  [CL] Vince W Betsymagda,          Diagnoses as of 09/14/24 1436   Closed fracture of right ankle, initial encounter        XR ankle right 3+ views   Final Result   1. Oblique transsyndesmotic fracture of the distal fibula.   2. Possible intra-articular fracturing of the posterior malleolus of   the tibia.   3. Incongruent ankle mortise with widening of the distal tibiofibular   syndesmosis and medial gutter of the ankle joint.   4. CT may be helpful for further characterization when the patient's   clinical condition allows.        MACRO:   None        Signed by: Christ Mclean 9/14/2024 1:40 PM   Dictation workstation:   [id number]      XR chest 1 view   Final Result   No acute cardiopulmonary process is evident.        MACRO:   None        Signed by: Christ Mclean 9/14/2024 1:32 PM   Dictation workstation:   [id number]      XR ankle right 3+ views    (Results Pending)       Patient and or family in agreement and understanding of treatment plan.  All questions answered.      I reviewed the case with the attending ED physician. The attending ED physician agrees with the plan. Patient and/or patient´s representative was counseled regarding labs, imaging, likely diagnosis, and plan. All questions were answered.    ED Medications administered this visit:    Medications   ceFAZolin (Ancef) 2 g in dextrose (iso)  mL (0 g intravenous Stopped 9/14/24 1406)   diphth,pertus(acell),tetanus (BoostRIX) 2.5-8-5 Lf-mcg-Lf/0.5mL vaccine 0.5 mL (0.5 mL intramuscular Given 9/14/24  1340)   morphine injection 4 mg (4 mg intravenous Given 9/14/24 1339)   HYDROmorphone (Dilaudid) injection 1 mg (1 mg intravenous Given 9/14/24 1419)       New Prescriptions from this visit:    New Prescriptions    No medications on file       Follow-up:  No follow-up provider specified.      Final Impression:   1. Closed fracture of right ankle, initial encounter          (Please note that portions of this note were completed with a voice recognition program.  Efforts were made to edit the dictations but occasionally words are mis-transcribed.)     Vince Sanchez,   Resident  09/14/24 [id number]

## 2024-09-14 NOTE — PROGRESS NOTES
Pharmacy Medication History Review    Stephanie Alatorre is a 53 y.o. female admitted for Closed fracture of right ankle. Pharmacy reviewed the patient's [id number]-[id number] medications and allergies for accuracy.    The list below reflectives the updated PTA list. Please review each medication in order reconciliation for additional clarification and justification.  Prior to Admission medications    Medication Sig Start Date End Date Authorizing Provider   hydroCHLOROthiazide (Microzide) 12.5 mg tablet Take 1 tablet (12.5 mg) by mouth once daily in the morning. Take before meals.   Raymundo Pritchard MD   b complex vitamins capsule Take 1 capsule by mouth once daily.   Historical Provider, MD   cetirizine (ZyrTEC) 10 mg tablet Take 1 tablet (10 mg) by mouth once daily as needed for allergies.   Historical Provider, MD   cholecalciferol, vitamin D3, 250 mcg (10,000 unit) capsule Take 1 capsule (250 mcg) by mouth every other day.   Historical Provider, MD   cyanocobalamin (Vitamin B-12) 1,000 mcg tablet Take 1 tablet (1,000 mcg) by mouth once daily.   Historical Provider, MD   multivitamin (Daily Multi-Vitamin) tablet Take 1 tablet by mouth once daily.   Historical Provider, MD   turmeric root extract 500 mg capsule Take 1 capsule by mouth 2 times a week.   Historical Provider, MD   zinc sulfate (Zincate) 220 (50 Zn) MG capsule Take 1 capsule (50 mg of elemental zinc) by mouth once daily.   Historical Provider, MD        The list below reflectives the updated allergy list. Please review each documented allergy for additional clarification and justification.  Allergies  Reviewed by Teresa Singleton on 9/14/2024        Severity Reactions Comments    Diphenhydramine Hcl Medium Anxiety     Flu Vac Qs 2015 (18-64yrs)(pf) Low Swelling At age 18 - arm swelling            Below are additional concerns with the patient's PTA list.      Teresa Singleton

## 2024-09-14 NOTE — H&P (VIEW-ONLY)
"Reason For Consult  RT ANKLE FX    History Of Present Illness  Stephanie Alatorre is a 53 y.o. female presenting with TRIP AND FALL AT HOME SUSTAINING RT ANKLE FX.  LIVES ALONE AND AMBULATES WITHOUT ASSIST. C/O PAIN     Past Medical History  She has a past medical history of Personal history of other (healed) physical injury and trauma (10/09/2015).    Surgical History  She has a past surgical history that includes Other surgical history (04/07/2022) and Other surgical history (04/07/2022).     Social History  She reports that she has never smoked. She has never been exposed to tobacco smoke. She has never used smokeless tobacco. She reports that she does not currently use alcohol. She reports that she does not currently use drugs.    Family History  Family History   Problem Relation Name Age of Onset    Other (arteriosclerotic cardiovascular) Mother      Other (Other) Sister          Allergies  Benadryl decongestant, Flu vac qs 2015 (18-64yrs)(pf), Other, and Diphenhydramine hcl    Review of Systems       Physical Exam  EXAM.  IN SPLINT.  GOOD SENSATION AND MOTOR     Last Recorded Vitals  Blood pressure 163/84, pulse (!) 112, temperature 37 °C (98.6 °F), temperature source Temporal, resp. rate 17, height 1.575 m (5' 2\"), weight (!) 170 kg (375 lb), SpO2 96%.    Relevant Results  INR 1.1  HGB 14.0    XRYAS DISPLACED DISTAL FIB FX WITH DEGENERATIVE CHANGES       Assessment/Plan     RT ANKLE FX    PLAN - REC ORIF. SURGERY, RISKS AND RECOVERY WERE DISCUSSED WITH PT  PLAN SUNDAY    THANK YOU      Taran Wyman MD    "

## 2024-09-14 NOTE — ED PROCEDURE NOTE
Procedure  Orthopaedic Injury Treatment - Lower Extremity    Performed by: Vince aSnchez DO  Authorized by: Colt Houser MD    Consent:     Consent obtained:  Written    Consent given by:  Patient    Risks, benefits, and alternatives were discussed: yes      Risks discussed:  Fracture, irreducible dislocation and recurrent dislocation    Alternatives discussed:  No treatment, immobilization, referral and delayed treatment  Universal protocol:     Procedure explained and questions answered to patient or proxy's satisfaction: yes      Relevant documents present and verified: yes      Test results available: yes      Imaging studies available: yes      Required blood products, implants, devices, and special equipment available: yes      Patient identity confirmed:  Verbally with patient  Location:     Location:  Ankle    Ankle injury location:  R ankle  Pre-procedure details:     Distal perfusion: normal      Range of motion: reduced    Sedation:     Sedation type:  None  Anesthesia:     Anesthesia method:  None  Procedure details:     Manipulation performed: yes      Ankle reduction method:  Direct traction    Reduction successful: yes      Reduction confirmed with imaging: yes      Immobilization:  Splint    Splint type:  Ankle stirrup    Supplies used:  Fiberglass and cotton padding  Post-procedure details:     Neurological function: normal      Distal perfusion: normal      Range of motion: unchanged      Procedure completion:  Tolerated               Vince Sanchez DO  Resident  09/14/24 [id number]

## 2024-09-14 NOTE — CARE PLAN
The patient's goals for the shift include pain at a tolerable level     The clinical goals for the shift include  pain at a tolerable level and safety

## 2024-09-15 ENCOUNTER — ANESTHESIA EVENT (OUTPATIENT)
Dept: OPERATING ROOM | Facility: HOSPITAL | Age: 54
End: 2024-09-15
Payer: COMMERCIAL

## 2024-09-15 ENCOUNTER — APPOINTMENT (OUTPATIENT)
Dept: RADIOLOGY | Facility: HOSPITAL | Age: 54
DRG: 493 | End: 2024-09-15
Payer: COMMERCIAL

## 2024-09-15 ENCOUNTER — ANESTHESIA (OUTPATIENT)
Dept: OPERATING ROOM | Facility: HOSPITAL | Age: 54
End: 2024-09-15
Payer: COMMERCIAL

## 2024-09-15 VITALS
OXYGEN SATURATION: 98 % | SYSTOLIC BLOOD PRESSURE: 138 MMHG | RESPIRATION RATE: 18 BRPM | HEART RATE: 92 BPM | TEMPERATURE: 95.7 F | HEIGHT: 62 IN | BODY MASS INDEX: 53.92 KG/M2 | DIASTOLIC BLOOD PRESSURE: 65 MMHG | WEIGHT: 293 LBS

## 2024-09-15 LAB
ANION GAP SERPL CALC-SCNC: 15 MMOL/L (ref 10–20)
B-HCG SERPL-ACNC: <2 MIU/ML
BUN SERPL-MCNC: 7 MG/DL (ref 6–23)
CALCIUM SERPL-MCNC: 8.6 MG/DL (ref 8.6–10.3)
CHLORIDE SERPL-SCNC: 98 MMOL/L (ref 98–107)
CO2 SERPL-SCNC: 28 MMOL/L (ref 21–32)
CREAT SERPL-MCNC: 0.47 MG/DL (ref 0.5–1.05)
EGFRCR SERPLBLD CKD-EPI 2021: >90 ML/MIN/1.73M*2
ERYTHROCYTE [DISTWIDTH] IN BLOOD BY AUTOMATED COUNT: 13.4 % (ref 11.5–14.5)
GLUCOSE SERPL-MCNC: 119 MG/DL (ref 74–99)
HCT VFR BLD AUTO: 39.1 % (ref 36–46)
HGB BLD-MCNC: 13.4 G/DL (ref 12–16)
MCH RBC QN AUTO: 32.4 PG (ref 26–34)
MCHC RBC AUTO-ENTMCNC: 34.3 G/DL (ref 32–36)
MCV RBC AUTO: 95 FL (ref 80–100)
NRBC BLD-RTO: 0 /100 WBCS (ref 0–0)
PLATELET # BLD AUTO: 182 X10*3/UL (ref 150–450)
POTASSIUM SERPL-SCNC: 3.3 MMOL/L (ref 3.5–5.3)
RBC # BLD AUTO: 4.13 X10*6/UL (ref 4–5.2)
SODIUM SERPL-SCNC: 138 MMOL/L (ref 136–145)
WBC # BLD AUTO: 7.2 X10*3/UL (ref 4.4–11.3)

## 2024-09-15 PROCEDURE — C1713 ANCHOR/SCREW BN/BN,TIS/BN: HCPCS | Performed by: ORTHOPAEDIC SURGERY

## 2024-09-15 PROCEDURE — 76000 FLUOROSCOPY <1 HR PHYS/QHP: CPT

## 2024-09-15 PROCEDURE — 2720000007 HC OR 272 NO HCPCS: Performed by: ORTHOPAEDIC SURGERY

## 2024-09-15 PROCEDURE — 3600000004 HC OR TIME - INITIAL BASE CHARGE - PROCEDURE LEVEL FOUR: Performed by: ORTHOPAEDIC SURGERY

## 2024-09-15 PROCEDURE — 99232 SBSQ HOSP IP/OBS MODERATE 35: CPT | Performed by: INTERNAL MEDICINE

## 2024-09-15 PROCEDURE — A27814 PR OPEN TREATMENT BIMALLEOLAR ANKLE FRACTURE: Performed by: NURSE ANESTHETIST, CERTIFIED REGISTERED

## 2024-09-15 PROCEDURE — 2500000004 HC RX 250 GENERAL PHARMACY W/ HCPCS (ALT 636 FOR OP/ED): Performed by: ORTHOPAEDIC SURGERY

## 2024-09-15 PROCEDURE — 3700000001 HC GENERAL ANESTHESIA TIME - INITIAL BASE CHARGE: Performed by: ORTHOPAEDIC SURGERY

## 2024-09-15 PROCEDURE — 3600000009 HC OR TIME - EACH INCREMENTAL 1 MINUTE - PROCEDURE LEVEL FOUR: Performed by: ORTHOPAEDIC SURGERY

## 2024-09-15 PROCEDURE — 84702 CHORIONIC GONADOTROPIN TEST: CPT | Performed by: ORTHOPAEDIC SURGERY

## 2024-09-15 PROCEDURE — 85027 COMPLETE CBC AUTOMATED: CPT | Performed by: INTERNAL MEDICINE

## 2024-09-15 PROCEDURE — 2500000005 HC RX 250 GENERAL PHARMACY W/O HCPCS: Performed by: ORTHOPAEDIC SURGERY

## 2024-09-15 PROCEDURE — 7100000002 HC RECOVERY ROOM TIME - EACH INCREMENTAL 1 MINUTE: Performed by: ORTHOPAEDIC SURGERY

## 2024-09-15 PROCEDURE — 2500000001 HC RX 250 WO HCPCS SELF ADMINISTERED DRUGS (ALT 637 FOR MEDICARE OP): Performed by: INTERNAL MEDICINE

## 2024-09-15 PROCEDURE — 1100000001 HC PRIVATE ROOM DAILY

## 2024-09-15 PROCEDURE — 2500000001 HC RX 250 WO HCPCS SELF ADMINISTERED DRUGS (ALT 637 FOR MEDICARE OP): Performed by: ORTHOPAEDIC SURGERY

## 2024-09-15 PROCEDURE — 7100000001 HC RECOVERY ROOM TIME - INITIAL BASE CHARGE: Performed by: ORTHOPAEDIC SURGERY

## 2024-09-15 PROCEDURE — 2500000004 HC RX 250 GENERAL PHARMACY W/ HCPCS (ALT 636 FOR OP/ED)

## 2024-09-15 PROCEDURE — A27814 PR OPEN TREATMENT BIMALLEOLAR ANKLE FRACTURE: Performed by: ANESTHESIOLOGY

## 2024-09-15 PROCEDURE — 2500000004 HC RX 250 GENERAL PHARMACY W/ HCPCS (ALT 636 FOR OP/ED): Performed by: NURSE ANESTHETIST, CERTIFIED REGISTERED

## 2024-09-15 PROCEDURE — 2500000004 HC RX 250 GENERAL PHARMACY W/ HCPCS (ALT 636 FOR OP/ED): Performed by: INTERNAL MEDICINE

## 2024-09-15 PROCEDURE — 99140 ANES COMP EMERGENCY COND: CPT | Performed by: ANESTHESIOLOGY

## 2024-09-15 PROCEDURE — 2780000003 HC OR 278 NO HCPCS: Performed by: ORTHOPAEDIC SURGERY

## 2024-09-15 PROCEDURE — 0QSJ04Z REPOSITION RIGHT FIBULA WITH INTERNAL FIXATION DEVICE, OPEN APPROACH: ICD-10-PCS | Performed by: ORTHOPAEDIC SURGERY

## 2024-09-15 PROCEDURE — 36415 COLL VENOUS BLD VENIPUNCTURE: CPT | Performed by: INTERNAL MEDICINE

## 2024-09-15 PROCEDURE — 2500000005 HC RX 250 GENERAL PHARMACY W/O HCPCS: Performed by: NURSE ANESTHETIST, CERTIFIED REGISTERED

## 2024-09-15 PROCEDURE — 82374 ASSAY BLOOD CARBON DIOXIDE: CPT | Performed by: INTERNAL MEDICINE

## 2024-09-15 PROCEDURE — 3700000002 HC GENERAL ANESTHESIA TIME - EACH INCREMENTAL 1 MINUTE: Performed by: ORTHOPAEDIC SURGERY

## 2024-09-15 PROCEDURE — 2500000004 HC RX 250 GENERAL PHARMACY W/ HCPCS (ALT 636 FOR OP/ED): Performed by: ANESTHESIOLOGY

## 2024-09-15 DEVICE — IMPLANTABLE DEVICE: Type: IMPLANTABLE DEVICE | Site: ANKLE | Status: FUNCTIONAL

## 2024-09-15 DEVICE — SCREW, CORTICAL, SELF-TAPPING, 3.5 X 18 MM, STAINLESS STEEL: Type: IMPLANTABLE DEVICE | Site: ANKLE | Status: FUNCTIONAL

## 2024-09-15 DEVICE — SCREW, CORTICAL, SELF-TAPPING, 3.5 X 16 MM, STAINLESS STEEL: Type: IMPLANTABLE DEVICE | Site: ANKLE | Status: FUNCTIONAL

## 2024-09-15 DEVICE — SCREW, CORTICAL, SELF-TAPPING, 3.5 X 26 MM, STAINLESS STEEL: Type: IMPLANTABLE DEVICE | Site: ANKLE | Status: FUNCTIONAL

## 2024-09-15 DEVICE — SCREW, CORTICAL, SELF-TAPPING, 3.5 X 14 MM, STAINLESS STEEL: Type: IMPLANTABLE DEVICE | Site: ANKLE | Status: FUNCTIONAL

## 2024-09-15 RX ORDER — NALOXONE HYDROCHLORIDE 1 MG/ML
0.2 INJECTION INTRAMUSCULAR; INTRAVENOUS; SUBCUTANEOUS EVERY 5 MIN PRN
Status: DISCONTINUED | OUTPATIENT
Start: 2024-09-15 | End: 2024-09-26 | Stop reason: HOSPADM

## 2024-09-15 RX ORDER — CYCLOBENZAPRINE HCL 10 MG
10 TABLET ORAL 3 TIMES DAILY PRN
Status: DISCONTINUED | OUTPATIENT
Start: 2024-09-15 | End: 2024-09-26 | Stop reason: HOSPADM

## 2024-09-15 RX ORDER — HYDROMORPHONE HYDROCHLORIDE 1 MG/ML
1 INJECTION, SOLUTION INTRAMUSCULAR; INTRAVENOUS; SUBCUTANEOUS EVERY 5 MIN PRN
Status: DISCONTINUED | OUTPATIENT
Start: 2024-09-15 | End: 2024-09-15 | Stop reason: HOSPADM

## 2024-09-15 RX ORDER — LIDOCAINE HYDROCHLORIDE 10 MG/ML
0.1 INJECTION, SOLUTION INFILTRATION; PERINEURAL ONCE
Status: DISCONTINUED | OUTPATIENT
Start: 2024-09-15 | End: 2024-09-15 | Stop reason: HOSPADM

## 2024-09-15 RX ORDER — SODIUM CHLORIDE 0.9 G/100ML
IRRIGANT IRRIGATION AS NEEDED
Status: DISCONTINUED | OUTPATIENT
Start: 2024-09-15 | End: 2024-09-15 | Stop reason: HOSPADM

## 2024-09-15 RX ORDER — PROPOFOL 10 MG/ML
INJECTION, EMULSION INTRAVENOUS AS NEEDED
Status: DISCONTINUED | OUTPATIENT
Start: 2024-09-15 | End: 2024-09-15

## 2024-09-15 RX ORDER — METOCLOPRAMIDE 10 MG/1
10 TABLET ORAL EVERY 6 HOURS PRN
Status: DISCONTINUED | OUTPATIENT
Start: 2024-09-15 | End: 2024-09-26 | Stop reason: HOSPADM

## 2024-09-15 RX ORDER — SODIUM CHLORIDE, SODIUM LACTATE, POTASSIUM CHLORIDE, CALCIUM CHLORIDE 600; 310; 30; 20 MG/100ML; MG/100ML; MG/100ML; MG/100ML
100 INJECTION, SOLUTION INTRAVENOUS CONTINUOUS
Status: ACTIVE | OUTPATIENT
Start: 2024-09-15 | End: 2024-09-16

## 2024-09-15 RX ORDER — CEFAZOLIN SODIUM 2 G/100ML
2 INJECTION, SOLUTION INTRAVENOUS ONCE
Status: DISCONTINUED | OUTPATIENT
Start: 2024-09-15 | End: 2024-09-26 | Stop reason: HOSPADM

## 2024-09-15 RX ORDER — BUPIVACAINE HYDROCHLORIDE 5 MG/ML
INJECTION, SOLUTION PERINEURAL AS NEEDED
Status: DISCONTINUED | OUTPATIENT
Start: 2024-09-15 | End: 2024-09-15 | Stop reason: HOSPADM

## 2024-09-15 RX ORDER — MEPERIDINE HYDROCHLORIDE 25 MG/ML
12.5 INJECTION INTRAMUSCULAR; INTRAVENOUS; SUBCUTANEOUS EVERY 10 MIN PRN
Status: DISCONTINUED | OUTPATIENT
Start: 2024-09-15 | End: 2024-09-15 | Stop reason: HOSPADM

## 2024-09-15 RX ORDER — ONDANSETRON 4 MG/1
4 TABLET, FILM COATED ORAL EVERY 8 HOURS PRN
Status: DISCONTINUED | OUTPATIENT
Start: 2024-09-15 | End: 2024-09-26 | Stop reason: HOSPADM

## 2024-09-15 RX ORDER — ONDANSETRON HYDROCHLORIDE 2 MG/ML
INJECTION, SOLUTION INTRAVENOUS AS NEEDED
Status: DISCONTINUED | OUTPATIENT
Start: 2024-09-15 | End: 2024-09-15

## 2024-09-15 RX ORDER — SUCCINYLCHOLINE CHLORIDE 20 MG/ML INJECTION SOLUTION
SOLUTION AS NEEDED
Status: DISCONTINUED | OUTPATIENT
Start: 2024-09-15 | End: 2024-09-15

## 2024-09-15 RX ORDER — MORPHINE SULFATE 2 MG/ML
2 INJECTION, SOLUTION INTRAMUSCULAR; INTRAVENOUS EVERY 2 HOUR PRN
Status: DISCONTINUED | OUTPATIENT
Start: 2024-09-15 | End: 2024-09-26 | Stop reason: HOSPADM

## 2024-09-15 RX ORDER — OXYCODONE HYDROCHLORIDE 5 MG/1
10 TABLET ORAL EVERY 4 HOURS PRN
Status: DISCONTINUED | OUTPATIENT
Start: 2024-09-15 | End: 2024-09-26 | Stop reason: HOSPADM

## 2024-09-15 RX ORDER — MORPHINE SULFATE 4 MG/ML
4 INJECTION, SOLUTION INTRAMUSCULAR; INTRAVENOUS EVERY 4 HOURS PRN
Status: DISCONTINUED | OUTPATIENT
Start: 2024-09-15 | End: 2024-09-26 | Stop reason: HOSPADM

## 2024-09-15 RX ORDER — HYDROCODONE BITARTRATE AND ACETAMINOPHEN 5; 325 MG/1; MG/1
1 TABLET ORAL EVERY 4 HOURS PRN
Status: DISCONTINUED | OUTPATIENT
Start: 2024-09-15 | End: 2024-09-22

## 2024-09-15 RX ORDER — LABETALOL HYDROCHLORIDE 5 MG/ML
10 INJECTION, SOLUTION INTRAVENOUS EVERY 10 MIN PRN
Status: DISCONTINUED | OUTPATIENT
Start: 2024-09-15 | End: 2024-09-15 | Stop reason: HOSPADM

## 2024-09-15 RX ORDER — METOCLOPRAMIDE HYDROCHLORIDE 5 MG/ML
10 INJECTION INTRAMUSCULAR; INTRAVENOUS EVERY 6 HOURS PRN
Status: DISCONTINUED | OUTPATIENT
Start: 2024-09-15 | End: 2024-09-26 | Stop reason: HOSPADM

## 2024-09-15 RX ORDER — MORPHINE SULFATE 2 MG/ML
2 INJECTION, SOLUTION INTRAMUSCULAR; INTRAVENOUS EVERY 4 HOURS PRN
Status: DISCONTINUED | OUTPATIENT
Start: 2024-09-15 | End: 2024-09-26 | Stop reason: HOSPADM

## 2024-09-15 RX ORDER — ROCURONIUM BROMIDE 10 MG/ML
INJECTION, SOLUTION INTRAVENOUS AS NEEDED
Status: DISCONTINUED | OUTPATIENT
Start: 2024-09-15 | End: 2024-09-15

## 2024-09-15 RX ORDER — ASPIRIN 325 MG
325 TABLET, DELAYED RELEASE (ENTERIC COATED) ORAL 2 TIMES DAILY
Status: DISCONTINUED | OUTPATIENT
Start: 2024-09-16 | End: 2024-09-26 | Stop reason: HOSPADM

## 2024-09-15 RX ORDER — LABETALOL HYDROCHLORIDE 5 MG/ML
INJECTION, SOLUTION INTRAVENOUS
Status: COMPLETED
Start: 2024-09-15 | End: 2024-09-15

## 2024-09-15 RX ORDER — DIPHENHYDRAMINE HCL 25 MG
25 CAPSULE ORAL EVERY 6 HOURS PRN
Status: DISCONTINUED | OUTPATIENT
Start: 2024-09-15 | End: 2024-09-26 | Stop reason: HOSPADM

## 2024-09-15 RX ORDER — KETAMINE HCL IN NACL, ISO-OSM 100MG/10ML
SYRINGE (ML) INJECTION AS NEEDED
Status: DISCONTINUED | OUTPATIENT
Start: 2024-09-15 | End: 2024-09-15

## 2024-09-15 RX ORDER — METOCLOPRAMIDE HYDROCHLORIDE 5 MG/ML
INJECTION INTRAMUSCULAR; INTRAVENOUS AS NEEDED
Status: DISCONTINUED | OUTPATIENT
Start: 2024-09-15 | End: 2024-09-15

## 2024-09-15 RX ORDER — LIDOCAINE HCL/PF 100 MG/5ML
SYRINGE (ML) INTRAVENOUS AS NEEDED
Status: DISCONTINUED | OUTPATIENT
Start: 2024-09-15 | End: 2024-09-15

## 2024-09-15 RX ORDER — POLYETHYLENE GLYCOL 3350 17 G/17G
17 POWDER, FOR SOLUTION ORAL DAILY
Status: DISCONTINUED | OUTPATIENT
Start: 2024-09-15 | End: 2024-09-26 | Stop reason: HOSPADM

## 2024-09-15 RX ORDER — ACETAMINOPHEN 325 MG/1
650 TABLET ORAL EVERY 4 HOURS PRN
Status: DISCONTINUED | OUTPATIENT
Start: 2024-09-15 | End: 2024-09-26 | Stop reason: HOSPADM

## 2024-09-15 RX ORDER — BISACODYL 5 MG
10 TABLET, DELAYED RELEASE (ENTERIC COATED) ORAL DAILY PRN
Status: DISCONTINUED | OUTPATIENT
Start: 2024-09-15 | End: 2024-09-26 | Stop reason: HOSPADM

## 2024-09-15 RX ORDER — SODIUM CHLORIDE, SODIUM LACTATE, POTASSIUM CHLORIDE, CALCIUM CHLORIDE 600; 310; 30; 20 MG/100ML; MG/100ML; MG/100ML; MG/100ML
100 INJECTION, SOLUTION INTRAVENOUS CONTINUOUS
Status: DISCONTINUED | OUTPATIENT
Start: 2024-09-15 | End: 2024-09-18

## 2024-09-15 RX ORDER — ONDANSETRON HYDROCHLORIDE 2 MG/ML
4 INJECTION, SOLUTION INTRAVENOUS EVERY 8 HOURS PRN
Status: DISCONTINUED | OUTPATIENT
Start: 2024-09-15 | End: 2024-09-26 | Stop reason: HOSPADM

## 2024-09-15 RX ORDER — FENTANYL CITRATE 50 UG/ML
INJECTION, SOLUTION INTRAMUSCULAR; INTRAVENOUS AS NEEDED
Status: DISCONTINUED | OUTPATIENT
Start: 2024-09-15 | End: 2024-09-15

## 2024-09-15 RX ORDER — MIDAZOLAM HYDROCHLORIDE 1 MG/ML
INJECTION, SOLUTION INTRAMUSCULAR; INTRAVENOUS AS NEEDED
Status: DISCONTINUED | OUTPATIENT
Start: 2024-09-15 | End: 2024-09-15

## 2024-09-15 RX ADMIN — PROPOFOL 200 MG: 10 INJECTION, EMULSION INTRAVENOUS at 13:35

## 2024-09-15 RX ADMIN — MORPHINE SULFATE 4 MG: 4 INJECTION, SOLUTION INTRAMUSCULAR; INTRAVENOUS at 21:06

## 2024-09-15 RX ADMIN — ENOXAPARIN SODIUM 60 MG: 60 INJECTION SUBCUTANEOUS at 21:06

## 2024-09-15 RX ADMIN — LIDOCAINE HYDROCHLORIDE 100 MG: 20 INJECTION INTRAVENOUS at 13:35

## 2024-09-15 RX ADMIN — Medication 160 MG: at 13:35

## 2024-09-15 RX ADMIN — SODIUM CHLORIDE, POTASSIUM CHLORIDE, SODIUM LACTATE AND CALCIUM CHLORIDE: 600; 310; 30; 20 INJECTION, SOLUTION INTRAVENOUS at 13:28

## 2024-09-15 RX ADMIN — CEFAZOLIN 3 G: 3 INJECTION, POWDER, FOR SOLUTION INTRAVENOUS at 13:40

## 2024-09-15 RX ADMIN — LABETALOL HYDROCHLORIDE 10 MG: 5 INJECTION INTRAVENOUS at 15:06

## 2024-09-15 RX ADMIN — FENTANYL CITRATE 100 MCG: 50 INJECTION, SOLUTION INTRAMUSCULAR; INTRAVENOUS at 13:35

## 2024-09-15 RX ADMIN — SUGAMMADEX 400 MG: 100 INJECTION, SOLUTION INTRAVENOUS at 14:48

## 2024-09-15 RX ADMIN — HYDROCHLOROTHIAZIDE 12.5 MG: 12.5 TABLET ORAL at 08:57

## 2024-09-15 RX ADMIN — LABETALOL HYDROCHLORIDE 10 MG: 5 INJECTION, SOLUTION INTRAVENOUS at 15:06

## 2024-09-15 RX ADMIN — DEXAMETHASONE SODIUM PHOSPHATE 8 MG: 4 INJECTION, SOLUTION INTRAMUSCULAR; INTRAVENOUS at 13:55

## 2024-09-15 RX ADMIN — ROCURONIUM BROMIDE 45 MG: 10 INJECTION, SOLUTION INTRAVENOUS at 13:40

## 2024-09-15 RX ADMIN — Medication 30 MG: at 13:35

## 2024-09-15 RX ADMIN — MORPHINE SULFATE 4 MG: 4 INJECTION, SOLUTION INTRAMUSCULAR; INTRAVENOUS at 03:10

## 2024-09-15 RX ADMIN — SODIUM CHLORIDE, POTASSIUM CHLORIDE, SODIUM LACTATE AND CALCIUM CHLORIDE 100 ML/HR: 600; 310; 30; 20 INJECTION, SOLUTION INTRAVENOUS at 16:44

## 2024-09-15 RX ADMIN — METOCLOPRAMIDE 10 MG: 5 INJECTION, SOLUTION INTRAMUSCULAR; INTRAVENOUS at 13:55

## 2024-09-15 RX ADMIN — ONDANSETRON 4 MG: 2 INJECTION INTRAMUSCULAR; INTRAVENOUS at 14:25

## 2024-09-15 RX ADMIN — MORPHINE SULFATE 4 MG: 4 INJECTION, SOLUTION INTRAMUSCULAR; INTRAVENOUS at 16:55

## 2024-09-15 RX ADMIN — MORPHINE SULFATE 4 MG: 4 INJECTION, SOLUTION INTRAMUSCULAR; INTRAVENOUS at 08:57

## 2024-09-15 RX ADMIN — HYDROMORPHONE HYDROCHLORIDE 0.5 MG: 1 INJECTION, SOLUTION INTRAMUSCULAR; INTRAVENOUS; SUBCUTANEOUS at 15:34

## 2024-09-15 RX ADMIN — ROCURONIUM BROMIDE 5 MG: 10 INJECTION, SOLUTION INTRAVENOUS at 13:35

## 2024-09-15 RX ADMIN — MIDAZOLAM 2 MG: 1 INJECTION INTRAMUSCULAR; INTRAVENOUS at 13:35

## 2024-09-15 RX ADMIN — HYDROCODONE BITARTRATE AND ACETAMINOPHEN 1 TABLET: 5; 325 TABLET ORAL at 22:30

## 2024-09-15 RX ADMIN — Medication 2 L/MIN: at 16:45

## 2024-09-15 RX ADMIN — HYDROMORPHONE HYDROCHLORIDE 2 MG: 2 INJECTION, SOLUTION INTRAMUSCULAR; INTRAVENOUS; SUBCUTANEOUS at 14:00

## 2024-09-15 RX ADMIN — SODIUM CHLORIDE, POTASSIUM CHLORIDE, SODIUM LACTATE AND CALCIUM CHLORIDE 100 ML/HR: 600; 310; 30; 20 INJECTION, SOLUTION INTRAVENOUS at 15:40

## 2024-09-15 SDOH — HEALTH STABILITY: MENTAL HEALTH: CURRENT SMOKER: 0

## 2024-09-15 ASSESSMENT — PAIN - FUNCTIONAL ASSESSMENT
PAIN_FUNCTIONAL_ASSESSMENT: 0-10

## 2024-09-15 ASSESSMENT — PAIN DESCRIPTION - DESCRIPTORS
DESCRIPTORS: DULL
DESCRIPTORS: ACHING
DESCRIPTORS: DULL
DESCRIPTORS: ACHING;DULL
DESCRIPTORS: DULL

## 2024-09-15 ASSESSMENT — PAIN SCALES - GENERAL
PAINLEVEL_OUTOF10: 2
PAINLEVEL_OUTOF10: 2
PAINLEVEL_OUTOF10: 0 - NO PAIN
PAINLEVEL_OUTOF10: 2
PAINLEVEL_OUTOF10: 7
PAINLEVEL_OUTOF10: 0 - NO PAIN
PAINLEVEL_OUTOF10: 8
PAINLEVEL_OUTOF10: 7
PAINLEVEL_OUTOF10: 4
PAINLEVEL_OUTOF10: 3
PAINLEVEL_OUTOF10: 6
PAINLEVEL_OUTOF10: 6
PAINLEVEL_OUTOF10: 0 - NO PAIN
PAINLEVEL_OUTOF10: 7
PAIN_LEVEL: 0
PAINLEVEL_OUTOF10: 4

## 2024-09-15 ASSESSMENT — COGNITIVE AND FUNCTIONAL STATUS - GENERAL
HELP NEEDED FOR BATHING: A LITTLE
CLIMB 3 TO 5 STEPS WITH RAILING: TOTAL
DRESSING REGULAR UPPER BODY CLOTHING: A LITTLE
TURNING FROM BACK TO SIDE WHILE IN FLAT BAD: A LOT
CLIMB 3 TO 5 STEPS WITH RAILING: TOTAL
MOBILITY SCORE: 10
DRESSING REGULAR LOWER BODY CLOTHING: A LOT
HELP NEEDED FOR BATHING: A LITTLE
WALKING IN HOSPITAL ROOM: TOTAL
DAILY ACTIVITIY SCORE: 18
TOILETING: A LOT
DRESSING REGULAR UPPER BODY CLOTHING: A LITTLE
MOVING FROM LYING ON BACK TO SITTING ON SIDE OF FLAT BED WITH BEDRAILS: A LOT
MOVING TO AND FROM BED TO CHAIR: A LOT
STANDING UP FROM CHAIR USING ARMS: A LOT
MOVING TO AND FROM BED TO CHAIR: A LOT
DRESSING REGULAR LOWER BODY CLOTHING: A LOT
STANDING UP FROM CHAIR USING ARMS: A LOT
TOILETING: A LOT
TURNING FROM BACK TO SIDE WHILE IN FLAT BAD: A LOT
WALKING IN HOSPITAL ROOM: TOTAL
DAILY ACTIVITIY SCORE: 18
MOVING FROM LYING ON BACK TO SITTING ON SIDE OF FLAT BED WITH BEDRAILS: A LOT
MOBILITY SCORE: 10

## 2024-09-15 ASSESSMENT — PAIN DESCRIPTION - LOCATION
LOCATION: ANKLE
LOCATION: ANKLE

## 2024-09-15 ASSESSMENT — PAIN DESCRIPTION - ORIENTATION
ORIENTATION: RIGHT
ORIENTATION: RIGHT

## 2024-09-15 NOTE — PROGRESS NOTES
"Stephanie Alatorre is a 53 y.o. female on day 1 of admission presenting with Closed fracture of right ankle.        Subjective   Patient seen this morning, no events overnight.  Heart rate and blood pressure improved with better pain control overnight.  Otherwise no events with plan for surgery today at 1:00.       Objective     Last Recorded Vitals  /77 (BP Location: Right arm, Patient Position: Lying)   Pulse 94   Temp 35.4 °C (95.7 °F) (Temporal)   Resp 18   Ht 1.575 m (5' 2\")   Wt (!) 170 kg (375 lb)   SpO2 93%   BMI 68.59 kg/m²     Intake/Output last 3 Shifts:  I/O last 3 completed shifts:  In: 100 (0.6 mL/kg) [IV Piggyback:100]  Out: 1400 (8.2 mL/kg) [Urine:1400 (0.2 mL/kg/hr)]  Weight: 170.1 kg       =========RELEVANT RESULTS ==========  Labs  Lab Results   Component Value Date    WBC 7.2 09/15/2024    HGB 13.4 09/15/2024    HCT 39.1 09/15/2024    MCV 95 09/15/2024     09/15/2024     Lab Results   Component Value Date    GLUCOSE 119 (H) 09/15/2024    CALCIUM 8.6 09/15/2024     09/15/2024    K 3.3 (L) 09/15/2024    CO2 28 09/15/2024    CL 98 09/15/2024    BUN 7 09/15/2024    CREATININE 0.47 (L) 09/15/2024      Lab Results   Component Value Date     (H) 09/14/2024    AST 86 (H) 09/14/2024    ALKPHOS 104 09/14/2024    BILITOT 1.1 09/14/2024        Recent Echocardiogram (14D):   No echocardiogram results found for the past 14 days    TTE 12 month if available:  No echocardiogram results found for the past 12 months    Recent Imaging Results:  XR ankle right 3+ views  Narrative: Interpreted By:  Christ Mclean,   STUDY:  Right ankle dated  9/14/2024.      INDICATION:  Signs/Symptoms:Post reduction      COMPARISON:  Radiographs from earlier on same date.      ACCESSION NUMBER(S):  [id number]      ORDERING CLINICIAN:  JENELLE DORANTES      TECHNIQUE:  Three views radiographs of the right ankle.      FINDINGS:  There is placement of a cast. There is redemonstration spiral  fracturing of " the transsyndesmotic distal fibula with intra-articular  extension. There is some improvement in the lateral displacement of  the distal dominant fragment. There is improvement in but persistent  incongruent to the ankle mortise with widening of the distal  tibiofibular syndesmosis and widening of the medial ankle gutter.  There is likely intra-articular oblique fracturing of the posterior  malleolus of the tibia, not well assessed. Severe degenerative  changes seen of the ankle joint. Corticated bodies are seen in the  ankle joint. Mild-to-moderate degenerative changes seen of the  subtalar joint. There is calcaneal enthesophyte formation. Mild  degenerative changes seen of the midfoot. Vascular calcifications are  seen in the soft tissues.      Impression: Interval casting with some improved anatomic alignment to distal  fibular fracturing and incongruent ankle mortise. CT is recommended  when patient's clinical condition allows.      MACRO:  None      Signed by: Christ Mclean 9/14/2024 3:15 PM  Dictation workstation:   [id number]  XR ankle right 3+ views  Narrative: Interpreted By:  Christ Mclean,   STUDY:  Right ankle dated  9/14/2024.      INDICATION:  Signs/Symptoms:Right ankle injury      COMPARISON:  None.      ACCESSION NUMBER(S):  [id number]      ORDERING CLINICIAN:  AURY GLASS      TECHNIQUE:  Three views radiographs of the right ankle.      FINDINGS:  There is an oblique transsyndesmotic fracture of the distal fibula.  There is incongruence of the ankle mortise with widening of the  distal tibiofibular syndesmosis and the medial gutter. There is  osseous irregularity to the posterior malleolus of the tibia. Severe  degenerative changes seen of the ankle joint. Corticated bodies are  seen of the anterior ankle joint and at the medial ankle joint.  Mild-to-moderate degenerative changes seen of the subtalar joint.  There is calcaneal enthesophyte formation. Mild degenerative changes  seen in the  midfoot.  There is a small volume ankle joint effusion.  Vascular calcifications are seen in the soft tissues.      Impression: 1. Oblique transsyndesmotic fracture of the distal fibula.  2. Possible intra-articular fracturing of the posterior malleolus of  the tibia.  3. Incongruent ankle mortise with widening of the distal tibiofibular  syndesmosis and medial gutter of the ankle joint.  4. CT may be helpful for further characterization when the patient's  clinical condition allows.      MACRO:  None      Signed by: Christ Mclean 9/14/2024 1:40 PM  Dictation workstation:   [id number]  XR chest 1 view  Narrative: Interpreted By:  Christ Mclean,   STUDY:  Chest dated  9/14/2024.      INDICATION:  Signs/Symptoms:fall      COMPARISON:  None.      ACCESSION NUMBER(S):  [id number]      ORDERING CLINICIAN:  AURY GLASS      TECHNIQUE:  One view of the chest.      FINDINGS:  The lungs are clear.  No pneumothorax or effusion is evident. The  cardiomediastinal silhouette is  not enlarged.Degenerative change is  seen of the spine and shoulders.      Impression: No acute cardiopulmonary process is evident.      MACRO:  None      Signed by: Christ Mclean 9/14/2024 1:32 PM  Dictation workstation:   [id number]      Exam     GENERAL:   no distress, alert and cooperative  HEENT: Normal Inspection, Mucous membranes moist, No JVD, No Lymphadenopathy  CARDIOVASCULAR: RRR, no murmurs, 2+ equal pulses of the extremities, normal S1 and S 2  RESPIRATORY: Patent airways, CTAB, thorax symmetric, No significant wheezing, Rales or Rhonchi  ABDOMEN: Soft, Non-Tender, Normal Bowel Sounds, No Distention  SKIN: Warm and dry, no lesions, no rashes  EXTREMITIES: normal extremities, no significant cyanosis edema, contusions or wounds, no obsvious clubbing  NEURO: A&O x 3, CN II-XII grossly intact  PSYCH: Appropriate mood and behavior    Additional Physical Exam Notes/Findings        ======= SCHEDULED MEDICATIONS =======  Scheduled  medications   Medication Dose Route Frequency    enoxaparin  60 mg subcutaneous q12h RYAN    hydroCHLOROthiazide  12.5 mg oral Daily before breakfast    lidocaine  1 patch transdermal Daily       ========== PRN MEDICATIONS =========  acetaminophen, 650 mg, q6h PRN  guaiFENesin, 600 mg, q12h PRN  melatonin, 3 mg, Nightly PRN  morphine, 2 mg, q4h PRN  morphine, 4 mg, q4h PRN  ondansetron, 4 mg, q6h PRN  polyethylene glycol, 17 g, Daily PRN        ==============  DIET  ==============  Dietary Orders (From admission, onward)       Start     Ordered    09/15/24 0001  NPO Diet; Effective midnight  Diet effective midnight         09/14/24 1527                    ====== Assessment/Plan   =======    ASSESSMENT:  Principal Problem:    Closed fracture of right ankle  Active Problems:    Closed fracture of right ankle, initial encounter    ___________________________________________________    Right ankle fracture s/p mechanical fall  Sinus tachycardia  HTN  Obesity        PLAN:    No major events overnight.   Focus on pain control  BP stable overnight WNL  HR improved.     DVT Prophylaxis  Subcutaneous lovenox    SUMMARY/DISPOSITION  Hemodynamically stable at this time.  Surgery planned for today.  Therapy consults.               Jc Sepulveda DO

## 2024-09-15 NOTE — OP NOTE
"PRE OP DIAGNOSIS - FX RT ANKLE    POST OP DIAGNOSIS-  RT ANKLE FX    PROCEDURE - ORIF RT ANKLE FX    SURGEON- ALISHA HERNÁNDEZ MD    ASSIST-   isiah mae pa-c    BLOOD LOSS  NONE    FINDINGS- FX     TOURNIQUET- 15\"    DRAIN- NONE    PT WAS TAKEN TO THE OPERATING ROOM AND PLACED SUPINE.  HE WAS PREPPED AND DRAPED IN A STERILE MANNER.  TOURNIQUET WAS INFLATED  MM HG.  LATEAL LONGITUDINAL INCISION WAS MADE OVER THE FX SITE THROUGH SKIN AND SUBCUTANEOUS TISSE.  THE FX WAS EXPOSED AND REDUCED.   I USED ONE ANT-POST 3.5 MM LAG SCREW AND IT WAS FIXED WITH A 6 HOLE SEMITUBULAR PLATE WITH 6 SCREWS WITH GOOD PURCHASE AND FIXATION.        WOUND WAS IRRIGATED.  AND CLOSED WITH A 2-0 VICRYL SUBCUTANEOUS STITCH AND 3-0 NYLON VERTICAL MATTRESS FOR THE SKIN.  A DRY STERILE DRESSING WAS APPLIED.  HE WAS PLACED IN A POSTERIOR SPLINT AND RETURNED TO THE RECOVERY ROOM IN SATISFACTORY CONDITION.    THE EXCELLENT ASSISTANCE OF CERTIFIED PA WAS NECESSARY FOR SUCCESSFUL OUTCOME OF THIS SURGERY INCLUDING PREP, EXPOSURE, RETRACTING AND CLOSURE    THIS WAS DICTATED BY ALISHA HERNÁNDEZ MD     "

## 2024-09-15 NOTE — PROGRESS NOTES
Occupational Therapy                 Therapy Communication Note    Patient Name: Stephanie Alatorre  MRN: [id number]  Department: [facility] 7  Room: [facility]73[facility]  Today's Date: 9/15/2024     Discipline: Occupational Therapy    Missed Visit Reason: Missed Visit Reason:  (pt. awaiting surgery, plan for ORIF today per EMR)    Missed Time: Attempt    Comment:

## 2024-09-15 NOTE — CARE PLAN
The clinical goals for the shift include Maintain pain to tolerable level    Pain initially uncontrolled with PRN and breakthrough pain medications. RLE elevated and pain now controlled with prn medications. NPO after midnight.

## 2024-09-15 NOTE — ANESTHESIA PREPROCEDURE EVALUATION
Patient: Stephanie Alatorre    Procedure Information       Date/Time: 09/15/24 1300    Procedure: Open Reduction Internal Fixation Ankle (Right: Ankle)    Location: [facility] OR 07 / Virtual [facility] OR    Surgeons: Taran Wyman MD            Relevant Problems   Cardiac   (+) Essential hypertension, benign   (+) Heart murmur      Endocrine   (+) Morbid obesity (Multi)      Hematology   (+) Anemia   (+) Iron deficiency anemia, unspecified      ID   (+) COVID-19       Clinical information reviewed:    Allergies  Meds               NPO Detail:  No data recorded     Physical Exam    Airway  Mallampati: I  TM distance: >3 FB  Neck ROM: full     Cardiovascular - normal exam  Rhythm: regular  (+) murmur     Dental - normal exam     Pulmonary - normal exam     Abdominal   (+) obese             Anesthesia Plan    History of general anesthesia?: yes  History of complications of general anesthesia?: no    ASA 3 - emergent     general     The patient is not a current smoker.  Patient was previously instructed to abstain from smoking on day of procedure.  Patient did not smoke on day of procedure.    intravenous induction   Postoperative administration of opioids is intended.  Trial extubation is planned.  Anesthetic plan and risks discussed with patient.  Use of blood products discussed with patient who consented to blood products.    Plan discussed with CRNA.

## 2024-09-15 NOTE — CARE PLAN
The patient's goals for the shift include  to have surgery    The clinical goals for the shift include Maintain pain to tolerable level

## 2024-09-15 NOTE — PROGRESS NOTES
Physical Therapy                 Therapy Communication Note    Patient Name: Stephanie Alatorre  MRN: [id number]  Department: [facility]  Room: [facility]73[facility]  Today's Date: 9/15/2024     Discipline: Physical Therapy    Missed Visit Reason: Missed Visit Reason:  (pt. awaiting surgery, plan for ORIF today per EMR)    Missed Time: Attempt

## 2024-09-15 NOTE — ANESTHESIA POSTPROCEDURE EVALUATION
Patient: Stephanie Alatorre    Procedure Summary       Date: 09/15/24 Room / Location: [facility] OR 07 / Virtual [facility] OR    Anesthesia Start: 1328 Anesthesia Stop: 1457    Procedure: Open Reduction Internal Fixation Ankle (Right: Ankle) Diagnosis:       Closed fracture of right ankle, initial encounter      (Closed fracture of right ankle, initial encounter [S82.891A])    Surgeons: Taran Wyman MD Responsible Provider: Kulwant Monroy MD    Anesthesia Type: general ASA Status: 3 - Emergent            Anesthesia Type: general    Vitals Value Taken Time   /93 09/15/24 1456   Temp 36.5 09/15/24 1457   Pulse 91 09/15/24 1456   Resp 18 09/15/24 1457   SpO2 94 % 09/15/24 1456   Vitals shown include unfiled device data.    Anesthesia Post Evaluation    Patient location during evaluation: PACU  Patient participation: complete - patient participated  Level of consciousness: awake and alert  Pain score: 0  Pain management: adequate  Airway patency: patent  Cardiovascular status: acceptable  Respiratory status: acceptable  Hydration status: acceptable  Postoperative Nausea and Vomiting: none        No notable events documented.

## 2024-09-16 LAB
ANION GAP SERPL CALC-SCNC: 11 MMOL/L (ref 10–20)
BUN SERPL-MCNC: 8 MG/DL (ref 6–23)
CALCIUM SERPL-MCNC: 8.5 MG/DL (ref 8.6–10.3)
CHLORIDE SERPL-SCNC: 98 MMOL/L (ref 98–107)
CO2 SERPL-SCNC: 32 MMOL/L (ref 21–32)
CREAT SERPL-MCNC: 0.45 MG/DL (ref 0.5–1.05)
EGFRCR SERPLBLD CKD-EPI 2021: >90 ML/MIN/1.73M*2
ERYTHROCYTE [DISTWIDTH] IN BLOOD BY AUTOMATED COUNT: 13.2 % (ref 11.5–14.5)
GLUCOSE SERPL-MCNC: 124 MG/DL (ref 74–99)
HCT VFR BLD AUTO: 35.5 % (ref 36–46)
HGB BLD-MCNC: 12.4 G/DL (ref 12–16)
MCH RBC QN AUTO: 32.8 PG (ref 26–34)
MCHC RBC AUTO-ENTMCNC: 34.9 G/DL (ref 32–36)
MCV RBC AUTO: 94 FL (ref 80–100)
NRBC BLD-RTO: 0 /100 WBCS (ref 0–0)
PLATELET # BLD AUTO: 149 X10*3/UL (ref 150–450)
POTASSIUM SERPL-SCNC: 3.4 MMOL/L (ref 3.5–5.3)
RBC # BLD AUTO: 3.78 X10*6/UL (ref 4–5.2)
SODIUM SERPL-SCNC: 138 MMOL/L (ref 136–145)
WBC # BLD AUTO: 8.2 X10*3/UL (ref 4.4–11.3)

## 2024-09-16 PROCEDURE — 2500000001 HC RX 250 WO HCPCS SELF ADMINISTERED DRUGS (ALT 637 FOR MEDICARE OP): Performed by: ORTHOPAEDIC SURGERY

## 2024-09-16 PROCEDURE — 36415 COLL VENOUS BLD VENIPUNCTURE: CPT | Performed by: ORTHOPAEDIC SURGERY

## 2024-09-16 PROCEDURE — 1100000001 HC PRIVATE ROOM DAILY

## 2024-09-16 PROCEDURE — 82374 ASSAY BLOOD CARBON DIOXIDE: CPT | Performed by: ORTHOPAEDIC SURGERY

## 2024-09-16 PROCEDURE — 99232 SBSQ HOSP IP/OBS MODERATE 35: CPT | Performed by: INTERNAL MEDICINE

## 2024-09-16 PROCEDURE — 2500000004 HC RX 250 GENERAL PHARMACY W/ HCPCS (ALT 636 FOR OP/ED): Performed by: ORTHOPAEDIC SURGERY

## 2024-09-16 PROCEDURE — 85027 COMPLETE CBC AUTOMATED: CPT | Performed by: ORTHOPAEDIC SURGERY

## 2024-09-16 PROCEDURE — 2500000005 HC RX 250 GENERAL PHARMACY W/O HCPCS: Performed by: ORTHOPAEDIC SURGERY

## 2024-09-16 PROCEDURE — 2500000004 HC RX 250 GENERAL PHARMACY W/ HCPCS (ALT 636 FOR OP/ED): Performed by: INTERNAL MEDICINE

## 2024-09-16 RX ADMIN — ENOXAPARIN SODIUM 60 MG: 60 INJECTION SUBCUTANEOUS at 21:26

## 2024-09-16 RX ADMIN — MORPHINE SULFATE 4 MG: 4 INJECTION, SOLUTION INTRAMUSCULAR; INTRAVENOUS at 21:26

## 2024-09-16 RX ADMIN — HYDROCHLOROTHIAZIDE 12.5 MG: 12.5 TABLET ORAL at 06:14

## 2024-09-16 RX ADMIN — HYDROCODONE BITARTRATE AND ACETAMINOPHEN 1 TABLET: 5; 325 TABLET ORAL at 21:30

## 2024-09-16 RX ADMIN — MORPHINE SULFATE 2 MG: 2 INJECTION, SOLUTION INTRAMUSCULAR; INTRAVENOUS at 15:29

## 2024-09-16 RX ADMIN — ASPIRIN 325 MG: 325 TABLET, COATED ORAL at 08:10

## 2024-09-16 RX ADMIN — LIDOCAINE 1 PATCH: 4 PATCH TOPICAL at 08:10

## 2024-09-16 RX ADMIN — HYDROCODONE BITARTRATE AND ACETAMINOPHEN 1 TABLET: 5; 325 TABLET ORAL at 06:14

## 2024-09-16 RX ADMIN — OXYCODONE HYDROCHLORIDE 10 MG: 5 TABLET ORAL at 01:25

## 2024-09-16 RX ADMIN — SODIUM CHLORIDE, POTASSIUM CHLORIDE, SODIUM LACTATE AND CALCIUM CHLORIDE 100 ML/HR: 600; 310; 30; 20 INJECTION, SOLUTION INTRAVENOUS at 01:17

## 2024-09-16 RX ADMIN — ASPIRIN 325 MG: 325 TABLET, COATED ORAL at 21:26

## 2024-09-16 RX ADMIN — ACETAMINOPHEN 650 MG: 325 TABLET ORAL at 12:35

## 2024-09-16 RX ADMIN — DEXTROSE MONOHYDRATE 3 G: 5 INJECTION INTRAVENOUS at 01:27

## 2024-09-16 RX ADMIN — ENOXAPARIN SODIUM 60 MG: 60 INJECTION SUBCUTANEOUS at 08:10

## 2024-09-16 RX ADMIN — DEXTROSE MONOHYDRATE 3 G: 5 INJECTION INTRAVENOUS at 08:11

## 2024-09-16 ASSESSMENT — PAIN DESCRIPTION - LOCATION
LOCATION: ANKLE
LOCATION: LEG
LOCATION: LEG
LOCATION: ANKLE

## 2024-09-16 ASSESSMENT — PAIN DESCRIPTION - ORIENTATION
ORIENTATION: RIGHT

## 2024-09-16 ASSESSMENT — PAIN SCALES - GENERAL
PAINLEVEL_OUTOF10: 2
PAINLEVEL_OUTOF10: 10 - WORST POSSIBLE PAIN
PAINLEVEL_OUTOF10: 6
PAINLEVEL_OUTOF10: 5 - MODERATE PAIN
PAINLEVEL_OUTOF10: 4
PAINLEVEL_OUTOF10: 2
PAINLEVEL_OUTOF10: 7
PAINLEVEL_OUTOF10: 10 - WORST POSSIBLE PAIN
PAINLEVEL_OUTOF10: 10 - WORST POSSIBLE PAIN
PAINLEVEL_OUTOF10: 2
PAINLEVEL_OUTOF10: 5 - MODERATE PAIN

## 2024-09-16 ASSESSMENT — PAIN SCALES - WONG BAKER
WONGBAKER_NUMERICALRESPONSE: HURTS WHOLE LOT
WONGBAKER_NUMERICALRESPONSE: HURTS WHOLE LOT

## 2024-09-16 ASSESSMENT — PAIN - FUNCTIONAL ASSESSMENT: PAIN_FUNCTIONAL_ASSESSMENT: 0-10

## 2024-09-16 NOTE — PROGRESS NOTES
09/16/24 1152   Discharge Planning   Living Arrangements Children   Support Systems Family members   Assistance Needed Independent   Type of Residence Private residence   Do you have animals or pets at home? Yes   Type of Animals or Pets 1 dog   Home or Post Acute Services In home services   Type of Home Care Services Home PT   Expected Discharge Disposition Home H     Met with patient at bedside. Introduced self and role as care coordinator. Demographics verified. Patient confirms she still has [employer] through a severance package from her previous job. Patient lives with her adult special needs daughter. Patient does not use any assistive devices but has access to them through her [employer]. Patient agreeable to SNF but would really like to go home. PT/OT on hold till clarification on patient weight bearing status can be determined. Care coordinator will follow for discharge planning.

## 2024-09-16 NOTE — PROGRESS NOTES
Occupational Therapy                 Therapy Communication Note    Patient Name: Stephanie Alatorre  MRN: [id number]  Department: [facility] 7  Room: [facility]731-  Today's Date: 9/16/2024     Discipline: Occupational Therapy    Missed Visit Reason: (Chart reviewed and case conferences with RN and patient at 9:56 am and 15:00 when attempted initial OT.  Plan:  on hold-awaiting clarification and order from ortho regarding WB status s/p ORIF right ankle fracture.)    Missed Time: Attempt

## 2024-09-16 NOTE — CARE PLAN
Problem: Fall/Injury  Goal: Be free from injury by end of the shift  Outcome: Progressing     Problem: Pain  Goal: Free from opioid side effects throughout the shift  Outcome: Progressing   The patient's goals for the shift include      The clinical goals for the shift include comfort and safety

## 2024-09-16 NOTE — CARE PLAN
The patient's goals for the shift include      The clinical goals for the shift include comfort and safety      Problem: Fall/Injury  Goal: Not fall by end of shift  Outcome: Progressing  Goal: Be free from injury by end of the shift  Outcome: Progressing  Goal: Verbalize understanding of personal risk factors for fall in the hospital  Outcome: Progressing  Goal: Verbalize understanding of risk factor reduction measures to prevent injury from fall in the home  Outcome: Progressing  Goal: Use assistive devices by end of the shift  Outcome: Progressing  Goal: Pace activities to prevent fatigue by end of the shift  Outcome: Progressing     Problem: Pain  Goal: Takes deep breaths with improved pain control throughout the shift  Outcome: Progressing  Goal: Turns in bed with improved pain control throughout the shift  Outcome: Progressing  Goal: Walks with improved pain control throughout the shift  Outcome: Progressing  Goal: Performs ADL's with improved pain control throughout shift  Outcome: Progressing  Goal: Participates in PT with improved pain control throughout the shift  Outcome: Progressing  Goal: Free from opioid side effects throughout the shift  Outcome: Progressing  Goal: Free from acute confusion related to pain meds throughout the shift  Outcome: Progressing     Problem: Skin  Goal: Decreased wound size/increased tissue granulation at next dressing change  9/16/2024 1438 by Camille Frank RN  Outcome: Progressing  9/16/2024 1437 by Camille Frank RN  Flowsheets (Taken 9/16/2024 1437)  Decreased wound size/increased tissue granulation at next dressing change: Protective dressings over bony prominences  Goal: Participates in plan/prevention/treatment measures  9/16/2024 1438 by Camille Frank RN  Outcome: Progressing  9/16/2024 1437 by Camille Frank RN  Flowsheets (Taken 9/16/2024 1437)  Participates in plan/prevention/treatment measures: Elevate heels  Goal: Prevent/manage excess  moisture  9/16/2024 1438 by Camille Frank RN  Outcome: Progressing  9/16/2024 1437 by Camille Frank RN  Flowsheets (Taken 9/16/2024 1437)  Prevent/manage excess moisture: Moisturize dry skin  Goal: Prevent/minimize sheer/friction injuries  9/16/2024 1438 by Camille Frank RN  Outcome: Progressing  9/16/2024 1437 by Camille Frank RN  Flowsheets (Taken 9/16/2024 1437)  Prevent/minimize sheer/friction injuries: Increase activity/out of bed for meals  Goal: Promote/optimize nutrition  9/16/2024 1438 by Camille Frank RN  Outcome: Progressing  9/16/2024 1437 by Camille Frank RN  Flowsheets (Taken 9/16/2024 1437)  Promote/optimize nutrition: Offer water/supplements/favorite foods  Goal: Promote skin healing  9/16/2024 1438 by Camille Frank RN  Outcome: Progressing  9/16/2024 1437 by Camille Frank RN  Flowsheets (Taken 9/16/2024 1437)  Promote skin healing: Protective dressings over bony prominences     Problem: Pain - Adult  Goal: Verbalizes/displays adequate comfort level or baseline comfort level  Outcome: Progressing     Problem: Safety - Adult  Goal: Free from fall injury  Outcome: Progressing     Problem: Discharge Planning  Goal: Discharge to home or other facility with appropriate resources  Outcome: Progressing     Problem: Chronic Conditions and Co-morbidities  Goal: Patient's chronic conditions and co-morbidity symptoms are monitored and maintained or improved  Outcome: Progressing

## 2024-09-16 NOTE — PROGRESS NOTES
Physical Therapy                 Therapy Communication Note    Patient Name: Stephanie Alatorre  MRN: [id number]  Department: [facility]  Room: 73/731-A  Today's Date: 9/16/2024     Discipline: Physical Therapy    Missed Visit Reason:  (awaiting WB status. will continue to attempt at later time/date.)

## 2024-09-17 LAB
ANION GAP SERPL CALC-SCNC: 11 MMOL/L (ref 10–20)
BUN SERPL-MCNC: 12 MG/DL (ref 6–23)
CALCIUM SERPL-MCNC: 8.5 MG/DL (ref 8.6–10.3)
CHLORIDE SERPL-SCNC: 97 MMOL/L (ref 98–107)
CO2 SERPL-SCNC: 32 MMOL/L (ref 21–32)
CREAT SERPL-MCNC: 0.49 MG/DL (ref 0.5–1.05)
EGFRCR SERPLBLD CKD-EPI 2021: >90 ML/MIN/1.73M*2
ERYTHROCYTE [DISTWIDTH] IN BLOOD BY AUTOMATED COUNT: 13.4 % (ref 11.5–14.5)
GLUCOSE SERPL-MCNC: 92 MG/DL (ref 74–99)
HCT VFR BLD AUTO: 38.9 % (ref 36–46)
HGB BLD-MCNC: 13 G/DL (ref 12–16)
MCH RBC QN AUTO: 32 PG (ref 26–34)
MCHC RBC AUTO-ENTMCNC: 33.4 G/DL (ref 32–36)
MCV RBC AUTO: 96 FL (ref 80–100)
NRBC BLD-RTO: 0 /100 WBCS (ref 0–0)
PLATELET # BLD AUTO: 161 X10*3/UL (ref 150–450)
POTASSIUM SERPL-SCNC: 3.4 MMOL/L (ref 3.5–5.3)
RBC # BLD AUTO: 4.06 X10*6/UL (ref 4–5.2)
SODIUM SERPL-SCNC: 137 MMOL/L (ref 136–145)
WBC # BLD AUTO: 7.9 X10*3/UL (ref 4.4–11.3)

## 2024-09-17 PROCEDURE — 99232 SBSQ HOSP IP/OBS MODERATE 35: CPT | Performed by: INTERNAL MEDICINE

## 2024-09-17 PROCEDURE — 2500000001 HC RX 250 WO HCPCS SELF ADMINISTERED DRUGS (ALT 637 FOR MEDICARE OP): Performed by: ORTHOPAEDIC SURGERY

## 2024-09-17 PROCEDURE — 85027 COMPLETE CBC AUTOMATED: CPT | Performed by: ORTHOPAEDIC SURGERY

## 2024-09-17 PROCEDURE — 2500000004 HC RX 250 GENERAL PHARMACY W/ HCPCS (ALT 636 FOR OP/ED): Performed by: ORTHOPAEDIC SURGERY

## 2024-09-17 PROCEDURE — 1100000001 HC PRIVATE ROOM DAILY

## 2024-09-17 PROCEDURE — 97161 PT EVAL LOW COMPLEX 20 MIN: CPT | Mod: GP

## 2024-09-17 PROCEDURE — 36415 COLL VENOUS BLD VENIPUNCTURE: CPT | Performed by: ORTHOPAEDIC SURGERY

## 2024-09-17 PROCEDURE — 2500000005 HC RX 250 GENERAL PHARMACY W/O HCPCS: Performed by: ORTHOPAEDIC SURGERY

## 2024-09-17 PROCEDURE — 80048 BASIC METABOLIC PNL TOTAL CA: CPT | Performed by: ORTHOPAEDIC SURGERY

## 2024-09-17 PROCEDURE — 97166 OT EVAL MOD COMPLEX 45 MIN: CPT | Mod: GO

## 2024-09-17 PROCEDURE — 2500000004 HC RX 250 GENERAL PHARMACY W/ HCPCS (ALT 636 FOR OP/ED): Performed by: INTERNAL MEDICINE

## 2024-09-17 RX ADMIN — HYDROCHLOROTHIAZIDE 12.5 MG: 12.5 TABLET ORAL at 07:02

## 2024-09-17 RX ADMIN — MORPHINE SULFATE 4 MG: 4 INJECTION, SOLUTION INTRAMUSCULAR; INTRAVENOUS at 09:10

## 2024-09-17 RX ADMIN — CYCLOBENZAPRINE 10 MG: 10 TABLET, FILM COATED ORAL at 20:52

## 2024-09-17 RX ADMIN — ENOXAPARIN SODIUM 60 MG: 60 INJECTION SUBCUTANEOUS at 20:46

## 2024-09-17 RX ADMIN — MORPHINE SULFATE 4 MG: 4 INJECTION, SOLUTION INTRAMUSCULAR; INTRAVENOUS at 01:26

## 2024-09-17 RX ADMIN — ASPIRIN 325 MG: 325 TABLET, COATED ORAL at 08:54

## 2024-09-17 RX ADMIN — OXYCODONE HYDROCHLORIDE 10 MG: 5 TABLET ORAL at 14:34

## 2024-09-17 RX ADMIN — LIDOCAINE 1 PATCH: 4 PATCH TOPICAL at 08:54

## 2024-09-17 RX ADMIN — OXYCODONE HYDROCHLORIDE 10 MG: 5 TABLET ORAL at 01:25

## 2024-09-17 RX ADMIN — ENOXAPARIN SODIUM 60 MG: 60 INJECTION SUBCUTANEOUS at 08:54

## 2024-09-17 RX ADMIN — ASPIRIN 325 MG: 325 TABLET, COATED ORAL at 20:46

## 2024-09-17 RX ADMIN — OXYCODONE HYDROCHLORIDE 10 MG: 5 TABLET ORAL at 20:48

## 2024-09-17 RX ADMIN — MORPHINE SULFATE 2 MG: 2 INJECTION, SOLUTION INTRAMUSCULAR; INTRAVENOUS at 19:24

## 2024-09-17 ASSESSMENT — COGNITIVE AND FUNCTIONAL STATUS - GENERAL
STANDING UP FROM CHAIR USING ARMS: A LOT
DAILY ACTIVITIY SCORE: 19
DAILY ACTIVITIY SCORE: 14
CLIMB 3 TO 5 STEPS WITH RAILING: TOTAL
CLIMB 3 TO 5 STEPS WITH RAILING: TOTAL
TURNING FROM BACK TO SIDE WHILE IN FLAT BAD: A LITTLE
MOBILITY SCORE: 13
MOVING FROM LYING ON BACK TO SITTING ON SIDE OF FLAT BED WITH BEDRAILS: A LITTLE
HELP NEEDED FOR BATHING: A LOT
PERSONAL GROOMING: A LITTLE
WALKING IN HOSPITAL ROOM: A LOT
HELP NEEDED FOR BATHING: A LITTLE
MOVING TO AND FROM BED TO CHAIR: A LOT
MOBILITY SCORE: 13
DRESSING REGULAR UPPER BODY CLOTHING: A LITTLE
TOILETING: A LITTLE
MOVING FROM LYING ON BACK TO SITTING ON SIDE OF FLAT BED WITH BEDRAILS: A LITTLE
DRESSING REGULAR LOWER BODY CLOTHING: A LOT
TOILETING: TOTAL
TURNING FROM BACK TO SIDE WHILE IN FLAT BAD: A LITTLE
DRESSING REGULAR UPPER BODY CLOTHING: A LITTLE
WALKING IN HOSPITAL ROOM: A LOT
DRESSING REGULAR LOWER BODY CLOTHING: TOTAL
MOVING TO AND FROM BED TO CHAIR: A LOT
STANDING UP FROM CHAIR USING ARMS: A LOT

## 2024-09-17 ASSESSMENT — PAIN SCALES - GENERAL
PAINLEVEL_OUTOF10: 0 - NO PAIN
PAINLEVEL_OUTOF10: 6
PAINLEVEL_OUTOF10: 6
PAINLEVEL_OUTOF10: 7
PAINLEVEL_OUTOF10: 9
PAINLEVEL_OUTOF10: 7
PAINLEVEL_OUTOF10: 6
PAINLEVEL_OUTOF10: 5 - MODERATE PAIN
PAINLEVEL_OUTOF10: 6
PAINLEVEL_OUTOF10: 9

## 2024-09-17 ASSESSMENT — PAIN - FUNCTIONAL ASSESSMENT
PAIN_FUNCTIONAL_ASSESSMENT: 0-10

## 2024-09-17 NOTE — PROGRESS NOTES
Occupational Therapy    Evaluation    Patient Name: Stephanie Alatorre  MRN: [id number]  Today's Date: 9/17/2024  Time Calculation  Start Time: 0930  Stop Time: 0954  Time Calculation (min): 24 min  731/731-A    Assessment  IP OT Assessment  OT Assessment: This hospitalization 9/14/2024 due to right ankle fracture s/p mechanical fall, Sinus tachycardia, HTN, Obesity, Heart murmur.  Patient would benefit from further OT due to high risk for further falls; ADL's and functional transfers/mobility while recovering from surgery:  *9/15/2024 ORIF right ankle fracture.  Prognosis: Good  End of Session Communication: Bedside nurse  End of Session Patient Position: Up in room, Alarm off, not on at start of session; call-light within reach    Plan:  Treatment Interventions: ADL retraining, Functional transfer training, Endurance training, Equipment evaluation/education, Patient/family training, Compensatory technique education (NWB RLE precaution)  OT Frequency: 3 times per week  OT Discharge Recommendations: Moderate intensity level of continued care  OT - OK to Discharge: Yes (to next level of care when medically cleared by physician/medical team)    Subjective   Current Problem:  1. Closed fracture of right ankle, initial encounter  Case Request Operating Room: Open Reduction Internal Fixation Ankle    Case Request Operating Room: Open Reduction Internal Fixation Ankle        General:  General  Reason for Referral: recent surgery  Referred By: Taran Wyman MD  Past Medical History Relevant to Rehab: HTN, mildly elevated liver enzymes, obesity  Missed Visit: Yes  Missed Visit Reason:  (Chart reviewed and case conferences with RN and patient at 9:56 am and 15:00 when attempted initial OT.  Plan:  on hold-awaiting clarification and order from ortho regarding WB status s/p ORIF right ankle fracture.)  Co-Treatment: PT (co-eval to maximize safety when assessing mobility tasks)  Prior to Session Communication: Bedside nurse who  confirmed that patient is medically stable to participate in this OT session  Patient Position Received: Bed, 2 rail up, Alarm off, not on at start of session  General Comment: Patient seen in room; cooperative, motivated    Precautions:  Medical Precautions: Fall precautions  Precautions Comment: NWB RLE, ace wraps present    Pain:  Pain Assessment  Pain Assessment: 0-10  0-10 (Numeric) Pain Score: 6  Pain Type: Surgical pain  Pain Location: Ankle  Pain Orientation:  (lateral)  Pain Interventions: Repositioned    Objective   Cognition:  Overall Cognitive Status: Within Functional Limits (provided emotional)     Home Living:  Type of Home: House  Lives With:  (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)  Home Adaptive Equipment: Walker rolling or standard, Wheelchair-manual (per patient: has access to all DME listed from [facility])  Home Layout: One level  Home Access: Stairs to enter without rails (3)  Bathroom Shower/Tub: Tub/shower unit  Bathroom Toilet:  (higher height)  Bathroom Equipment: Tub transfer bench, Hand-held shower hose, Bedside commode, Grab bars in shower  Home Living Comments: sleep in regular bed     Prior Function:  Level of [place]: Independent with ADLs and functional transfers, Independent with homemaking with ambulation  Ambulatory Assistance: Independent (without device)  Prior Function Comments: drives, shops    Current ADL:  LE Dressing Assistance: Total  LE Dressing Deficit: Don/doff L sock  ADL Comments: Would benefit from further addressing in OT sessions using adaptive equipment/assistive techniques as needed to promote indep. and ease in performance.    Bed Mobility/Transfers:   Bed Mobility  Bed Mobility: Yes  Bed Mobility 1  Bed Mobility 1: Supine to sitting  Level of Assistance 1: Minimal verbal cues (SBA)  Bed Mobility Comments 1: HOB elevated  Transfers  Transfer: Yes  Transfer 1  Transfer From 1: Sit to, Stand to  Transfer to 1: Bed, Chair with arms  Transfer Device  1: Walker, Gait belt  Transfer Level of Assistance 1: Contact guard, Moderate verbal cues, Moderate tactile cues    Ambulation/Gait Training:  Functional Mobility  Functional Mobility Performed: Yes  Functional Mobility 1  Device 1: Rolling walker  Functional Mobility Support Devices: Gait belt  Assistance 1: Minimum assistance, Maximum verbal cues, Maximum tactile cues  Comments 1: few small steps from bed to recliner chair    Sitting Balance:  Static Sitting Balance  Static Sitting-Level of Assistance: Close supervision    Standing Balance:  Static Standing Balance  Static Standing-Level of Assistance: Minimum assistance    Sensation:  Light Touch: No apparent deficits    Extremities: RUE   RUE : Within Functional Limits and LUE   LUE: Within Functional Limits    Outcome Measures: [facility] Daily Activity  Putting on and taking off regular lower body clothing: Total  Bathing (including washing, rinsing, drying): A lot  Putting on and taking off regular upper body clothing: A little  Toileting, which includes using toilet, bedpan or urinal: Total  Taking care of personal grooming such as brushing teeth: A little  Eating Meals: None  Daily Activity - Total Score: 14     EDUCATION:  Education Documentation  Precautions, taught by Marina Courtney OT at 9/17/2024  2:56 PM.  Learner: Patient  Readiness: Acceptance  Method: Explanation  Response: Verbalizes Understanding, Demonstrated Understanding, Needs Reinforcement  Comment: NWMOLLY RLE, proper mobility techniques to promote safety    Goals:   Encounter Problems       Encounter Problems (Active)       OT Goals       Patient will complete upper and lower body bathing/dressing; toileting with minimal assist using adaptive equipment as needed  (Progressing)       Start:  09/17/24    Expected End:  10/01/24            Patient will perform bed mobility and functional transfers safely with supervision: bed, chair, commode using DME as needed  (Progressing)       Start:  09/17/24     Expected End:  10/01/24            adhere to NWB RLE precaution during ADL's and functional transfers  (Progressing)       Start:  09/17/24    Expected End:  10/01/24            Patient will tolerate standing for 3 mins. and show overall good (-) standing balance during ADL's and functional transfers/mobility  (Progressing)       Start:  09/17/24    Expected End:  10/01/24
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
## 2024-09-17 NOTE — CARE PLAN
The patient's goals for the shift include      The clinical goals for the shift include work with pt/ot      Problem: Fall/Injury  Goal: Not fall by end of shift  Outcome: Progressing  Goal: Be free from injury by end of the shift  Outcome: Progressing  Goal: Verbalize understanding of personal risk factors for fall in the hospital  Outcome: Progressing  Goal: Verbalize understanding of risk factor reduction measures to prevent injury from fall in the home  Outcome: Progressing  Goal: Use assistive devices by end of the shift  Outcome: Progressing  Goal: Pace activities to prevent fatigue by end of the shift  Outcome: Progressing     Problem: Pain  Goal: Takes deep breaths with improved pain control throughout the shift  Outcome: Progressing  Goal: Turns in bed with improved pain control throughout the shift  Outcome: Progressing  Goal: Walks with improved pain control throughout the shift  Outcome: Progressing  Goal: Performs ADL's with improved pain control throughout shift  Outcome: Progressing  Goal: Participates in PT with improved pain control throughout the shift  Outcome: Progressing  Goal: Free from opioid side effects throughout the shift  Outcome: Progressing  Goal: Free from acute confusion related to pain meds throughout the shift  Outcome: Progressing     Problem: Skin  Goal: Decreased wound size/increased tissue granulation at next dressing change  9/16/2024 2351 by Meli Hamilton RN  Outcome: Progressing  9/16/2024 2350 by Meli Hamilton RN  Flowsheets (Taken 9/16/2024 2350)  Decreased wound size/increased tissue granulation at next dressing change: Promote sleep for wound healing  Goal: Participates in plan/prevention/treatment measures  9/16/2024 2351 by Meli Hamilton RN  Outcome: Progressing  9/16/2024 2350 by Meli Hamilton RN  Flowsheets (Taken 9/16/2024 2350)  Participates in plan/prevention/treatment measures: Elevate heels  Goal: Prevent/manage excess moisture  9/16/2024 2351 by Meli  VESNA Hamilton  Outcome: Progressing  9/16/2024 2350 by Meli Hamilton RN  Flowsheets (Taken 9/16/2024 2350)  Prevent/manage excess moisture: Moisturize dry skin  Goal: Prevent/minimize sheer/friction injuries  9/16/2024 2351 by Meli Hamilton RN  Outcome: Progressing  9/16/2024 2350 by Meli Hamilton RN  Flowsheets (Taken 9/16/2024 2350)  Prevent/minimize sheer/friction injuries: Use pull sheet  Goal: Promote/optimize nutrition  9/16/2024 2351 by Meli Hamilton RN  Outcome: Progressing  9/16/2024 2350 by Meli Hamilton RN  Flowsheets (Taken 9/16/2024 2350)  Promote/optimize nutrition: Monitor/record intake including meals  Goal: Promote skin healing  9/16/2024 2351 by Meli Hamilton RN  Outcome: Progressing  9/16/2024 2350 by Meli Hamilton RN  Flowsheets (Taken 9/16/2024 2350)  Promote skin healing: Assess skin/pad under line(s)/device(s)     Problem: Pain - Adult  Goal: Verbalizes/displays adequate comfort level or baseline comfort level  Outcome: Progressing     Problem: Safety - Adult  Goal: Free from fall injury  Outcome: Progressing     Problem: Discharge Planning  Goal: Discharge to home or other facility with appropriate resources  Outcome: Progressing     Problem: Chronic Conditions and Co-morbidities  Goal: Patient's chronic conditions and co-morbidity symptoms are monitored and maintained or improved  Outcome: Progressing

## 2024-09-17 NOTE — PROGRESS NOTES
"Stephanie Alatorre is a 53 y.o. female on day 2 of admission presenting with Closed fracture of right ankle.        Subjective   Patient seen this morning, no events overnight. She was worried about her discharge planning and physical rehab, she wasn't able to get her PT/OT as she was confused about WBAT vs non WBAT.  Denies any f/c, n/v, new onset headaches, vision changes, chest pain, dyspnea, abdominal pain, changes in BM, or urinary changes.         Objective     Last Recorded Vitals  /70 (BP Location: Right arm, Patient Position: Lying)   Pulse 91   Temp 36.2 °C (97.2 °F) (Temporal)   Resp 18   Ht 1.575 m (5' 2.01\")   Wt (!) 171 kg (376 lb 15.4 oz)   LMP  (LMP Unknown) Comment: spiratic per patient hcg negative  SpO2 92%   BMI 68.93 kg/m²     Intake/Output last 3 Shifts:  I/O last 3 completed shifts:  In: 2000 (11.7 mL/kg) [I.V.:1100 (6.4 mL/kg); IV Piggyback:900]  Out: 1600 (9.4 mL/kg) [Urine:1600 (0.3 mL/kg/hr)]  Weight: 171 kg       =========RELEVANT RESULTS ==========  Labs  Lab Results   Component Value Date    WBC 8.2 09/16/2024    HGB 12.4 09/16/2024    HCT 35.5 (L) 09/16/2024    MCV 94 09/16/2024     (L) 09/16/2024     Lab Results   Component Value Date    GLUCOSE 124 (H) 09/16/2024    CALCIUM 8.5 (L) 09/16/2024     09/16/2024    K 3.4 (L) 09/16/2024    CO2 32 09/16/2024    CL 98 09/16/2024    BUN 8 09/16/2024    CREATININE 0.45 (L) 09/16/2024      Lab Results   Component Value Date     (H) 09/14/2024    AST 86 (H) 09/14/2024    ALKPHOS 104 09/14/2024    BILITOT 1.1 09/14/2024        Recent Echocardiogram (14D):   No echocardiogram results found for the past 14 days    TTE 12 month if available:  No echocardiogram results found for the past 12 months    Recent Imaging Results:  ECG 12 lead  Sinus tachycardia with Premature atrial complexes  Otherwise normal ECG  No previous ECGs available      Exam     GENERAL:   no distress, alert and cooperative  HEENT: Normal " Inspection, Mucous membranes moist, No JVD, No Lymphadenopathy  CARDIOVASCULAR: RRR, no murmurs, 2+ equal pulses of the extremities, normal S1 and S 2  RESPIRATORY: Patent airways, CTAB, thorax symmetric, No significant wheezing, Rales or Rhonchi  ABDOMEN: Soft, Non-Tender, Normal Bowel Sounds, No Distention  SKIN: Warm and dry, no lesions, no rashes  EXTREMITIES: normal extremities, no significant cyanosis edema, contusions or wounds, no obsvious clubbing  NEURO: A&O x 3, CN II-XII grossly intact  PSYCH: Appropriate mood and behavior    Additional Physical Exam Notes/Findings        ======= SCHEDULED MEDICATIONS =======  Scheduled medications   Medication Dose Route Frequency    aspirin  325 mg oral BID    ceFAZolin  2 g intravenous Once    enoxaparin  60 mg subcutaneous q12h RYAN    hydroCHLOROthiazide  12.5 mg oral Daily before breakfast    lidocaine  1 patch transdermal Daily    polyethylene glycol  17 g oral Daily       ========== PRN MEDICATIONS =========  acetaminophen, 650 mg, q6h PRN  acetaminophen, 650 mg, q4h PRN  bisacodyl, 10 mg, Daily PRN  cyclobenzaprine, 10 mg, TID PRN  diphenhydrAMINE, 25 mg, q6h PRN  guaiFENesin, 600 mg, q12h PRN  HYDROcodone-acetaminophen, 1 tablet, q4h PRN  melatonin, 3 mg, Nightly PRN  metoclopramide, 10 mg, q6h PRN   Or  metoclopramide, 10 mg, q6h PRN  morphine, 2 mg, q2h PRN  morphine, 2 mg, q4h PRN  morphine, 4 mg, q4h PRN  naloxone, 0.2 mg, q5 min PRN  naloxone, 0.2 mg, q5 min PRN  naloxone, 0.2 mg, q5 min PRN  naloxone, 0.2 mg, q5 min PRN  ondansetron, 4 mg, q6h PRN  ondansetron, 4 mg, q8h PRN   Or  ondansetron, 4 mg, q8h PRN  oxyCODONE, 10 mg, q4h PRN  polyethylene glycol, 17 g, Daily PRN        ==============  DIET  ==============  Dietary Orders (From admission, onward)       Start     Ordered    09/15/24 1612  Adult diet Regular  Diet effective now        Question:  Diet type  Answer:  Regular    09/15/24 1611                    ====== Assessment/Plan    =======    ASSESSMENT:  Principal Problem:    Closed fracture of right ankle  Active Problems:    Closed fracture of right ankle, initial encounter    ___________________________________________________    Right ankle fracture s/p mechanical fall  Sinus tachycardia  HTN  Obesity        PLAN:    No major events overnight.   Focus on pain control  BP stable overnight WNL  HR improved.     DVT Prophylaxis  Subcutaneous lovenox    SUMMARY/DISPOSITION  Hemodynamically stable after surgery. PT/OT evaluation and discharge planning.               Tom Johnson, DO

## 2024-09-17 NOTE — CARE PLAN
The patient's goals for the shift include      The clinical goals for the shift include pain free dermine WB status      Problem: Fall/Injury  Goal: Not fall by end of shift  Outcome: Progressing  Goal: Be free from injury by end of the shift  Outcome: Progressing  Goal: Verbalize understanding of personal risk factors for fall in the hospital  Outcome: Progressing  Goal: Verbalize understanding of risk factor reduction measures to prevent injury from fall in the home  Outcome: Progressing  Goal: Use assistive devices by end of the shift  Outcome: Progressing  Goal: Pace activities to prevent fatigue by end of the shift  Outcome: Progressing     Problem: Pain  Goal: Takes deep breaths with improved pain control throughout the shift  Outcome: Progressing  Goal: Turns in bed with improved pain control throughout the shift  Outcome: Progressing  Goal: Walks with improved pain control throughout the shift  Outcome: Progressing  Goal: Performs ADL's with improved pain control throughout shift  Outcome: Progressing  Goal: Participates in PT with improved pain control throughout the shift  Outcome: Progressing  Goal: Free from opioid side effects throughout the shift  Outcome: Progressing  Goal: Free from acute confusion related to pain meds throughout the shift  Outcome: Progressing     Problem: Skin  Goal: Decreased wound size/increased tissue granulation at next dressing change  9/17/2024 1054 by Camille Frank RN  Outcome: Progressing  9/17/2024 1053 by Camille Frank RN  Flowsheets (Taken 9/17/2024 1053)  Decreased wound size/increased tissue granulation at next dressing change: Promote sleep for wound healing  Goal: Participates in plan/prevention/treatment measures  9/17/2024 1054 by Camille Frank RN  Outcome: Progressing  9/17/2024 1053 by Camille Frank RN  Flowsheets (Taken 9/17/2024 1053)  Participates in plan/prevention/treatment measures: Elevate heels  Goal: Prevent/manage excess  moisture  9/17/2024 1054 by Camille Frank RN  Outcome: Progressing  9/17/2024 1053 by Camille Frank RN  Flowsheets (Taken 9/17/2024 1053)  Prevent/manage excess moisture: Moisturize dry skin  Goal: Prevent/minimize sheer/friction injuries  9/17/2024 1054 by Camille Frank RN  Outcome: Progressing  9/17/2024 1053 by Camille Frank RN  Flowsheets (Taken 9/17/2024 1053)  Prevent/minimize sheer/friction injuries: Increase activity/out of bed for meals  Goal: Promote/optimize nutrition  9/17/2024 1054 by Camille Frank RN  Outcome: Progressing  9/17/2024 1053 by Camille Frank RN  Flowsheets (Taken 9/17/2024 1053)  Promote/optimize nutrition: Consume > 50% meals/supplements  Goal: Promote skin healing  9/17/2024 1054 by Camille Frank RN  Outcome: Progressing  9/17/2024 1053 by Camille Frank RN  Flowsheets (Taken 9/17/2024 1053)  Promote skin healing: Assess skin/pad under line(s)/device(s)     Problem: Pain - Adult  Goal: Verbalizes/displays adequate comfort level or baseline comfort level  Outcome: Progressing     Problem: Safety - Adult  Goal: Free from fall injury  Outcome: Progressing     Problem: Discharge Planning  Goal: Discharge to home or other facility with appropriate resources  Outcome: Progressing     Problem: Chronic Conditions and Co-morbidities  Goal: Patient's chronic conditions and co-morbidity symptoms are monitored and maintained or improved  Outcome: Progressing

## 2024-09-18 LAB
ANION GAP SERPL CALC-SCNC: 11 MMOL/L (ref 10–20)
BUN SERPL-MCNC: 15 MG/DL (ref 6–23)
CALCIUM SERPL-MCNC: 8.7 MG/DL (ref 8.6–10.3)
CHLORIDE SERPL-SCNC: 97 MMOL/L (ref 98–107)
CO2 SERPL-SCNC: 33 MMOL/L (ref 21–32)
CREAT SERPL-MCNC: 0.43 MG/DL (ref 0.5–1.05)
EGFRCR SERPLBLD CKD-EPI 2021: >90 ML/MIN/1.73M*2
ERYTHROCYTE [DISTWIDTH] IN BLOOD BY AUTOMATED COUNT: 13.5 % (ref 11.5–14.5)
GLUCOSE SERPL-MCNC: 95 MG/DL (ref 74–99)
HCT VFR BLD AUTO: 37.3 % (ref 36–46)
HGB BLD-MCNC: 12.5 G/DL (ref 12–16)
MCH RBC QN AUTO: 32.2 PG (ref 26–34)
MCHC RBC AUTO-ENTMCNC: 33.5 G/DL (ref 32–36)
MCV RBC AUTO: 96 FL (ref 80–100)
NRBC BLD-RTO: 0 /100 WBCS (ref 0–0)
PLATELET # BLD AUTO: 170 X10*3/UL (ref 150–450)
POTASSIUM SERPL-SCNC: 3.2 MMOL/L (ref 3.5–5.3)
RBC # BLD AUTO: 3.88 X10*6/UL (ref 4–5.2)
SODIUM SERPL-SCNC: 138 MMOL/L (ref 136–145)
WBC # BLD AUTO: 7.1 X10*3/UL (ref 4.4–11.3)

## 2024-09-18 PROCEDURE — 2500000004 HC RX 250 GENERAL PHARMACY W/ HCPCS (ALT 636 FOR OP/ED): Performed by: ORTHOPAEDIC SURGERY

## 2024-09-18 PROCEDURE — 2500000001 HC RX 250 WO HCPCS SELF ADMINISTERED DRUGS (ALT 637 FOR MEDICARE OP): Performed by: ORTHOPAEDIC SURGERY

## 2024-09-18 PROCEDURE — 2500000001 HC RX 250 WO HCPCS SELF ADMINISTERED DRUGS (ALT 637 FOR MEDICARE OP): Performed by: INTERNAL MEDICINE

## 2024-09-18 PROCEDURE — 85027 COMPLETE CBC AUTOMATED: CPT | Performed by: ORTHOPAEDIC SURGERY

## 2024-09-18 PROCEDURE — 97535 SELF CARE MNGMENT TRAINING: CPT | Mod: GO

## 2024-09-18 PROCEDURE — 2500000004 HC RX 250 GENERAL PHARMACY W/ HCPCS (ALT 636 FOR OP/ED): Performed by: INTERNAL MEDICINE

## 2024-09-18 PROCEDURE — 1100000001 HC PRIVATE ROOM DAILY

## 2024-09-18 PROCEDURE — 80048 BASIC METABOLIC PNL TOTAL CA: CPT | Performed by: ORTHOPAEDIC SURGERY

## 2024-09-18 PROCEDURE — 99232 SBSQ HOSP IP/OBS MODERATE 35: CPT | Performed by: INTERNAL MEDICINE

## 2024-09-18 PROCEDURE — 36415 COLL VENOUS BLD VENIPUNCTURE: CPT | Performed by: ORTHOPAEDIC SURGERY

## 2024-09-18 RX ORDER — HYDROCHLOROTHIAZIDE 25 MG/1
25 TABLET ORAL
Status: DISCONTINUED | OUTPATIENT
Start: 2024-09-19 | End: 2024-09-24

## 2024-09-18 RX ORDER — POTASSIUM CHLORIDE 14.9 MG/ML
20 INJECTION INTRAVENOUS
Status: COMPLETED | OUTPATIENT
Start: 2024-09-18 | End: 2024-09-18

## 2024-09-18 RX ORDER — AMLODIPINE BESYLATE 5 MG/1
5 TABLET ORAL DAILY
Status: DISCONTINUED | OUTPATIENT
Start: 2024-09-18 | End: 2024-09-24

## 2024-09-18 RX ADMIN — ENOXAPARIN SODIUM 60 MG: 60 INJECTION SUBCUTANEOUS at 09:21

## 2024-09-18 RX ADMIN — AMLODIPINE BESYLATE 5 MG: 5 TABLET ORAL at 10:16

## 2024-09-18 RX ADMIN — ENOXAPARIN SODIUM 60 MG: 60 INJECTION SUBCUTANEOUS at 21:01

## 2024-09-18 RX ADMIN — HYDROCHLOROTHIAZIDE 12.5 MG: 12.5 TABLET ORAL at 06:03

## 2024-09-18 RX ADMIN — MORPHINE SULFATE 2 MG: 2 INJECTION, SOLUTION INTRAMUSCULAR; INTRAVENOUS at 16:15

## 2024-09-18 RX ADMIN — OXYCODONE HYDROCHLORIDE 10 MG: 5 TABLET ORAL at 19:12

## 2024-09-18 RX ADMIN — ASPIRIN 325 MG: 325 TABLET, COATED ORAL at 21:01

## 2024-09-18 RX ADMIN — OXYCODONE HYDROCHLORIDE 10 MG: 5 TABLET ORAL at 09:21

## 2024-09-18 RX ADMIN — OXYCODONE HYDROCHLORIDE 10 MG: 5 TABLET ORAL at 04:37

## 2024-09-18 RX ADMIN — OXYCODONE HYDROCHLORIDE 10 MG: 5 TABLET ORAL at 23:13

## 2024-09-18 RX ADMIN — POTASSIUM CHLORIDE 20 MEQ: 14.9 INJECTION, SOLUTION INTRAVENOUS at 13:24

## 2024-09-18 RX ADMIN — POTASSIUM CHLORIDE 20 MEQ: 14.9 INJECTION, SOLUTION INTRAVENOUS at 10:16

## 2024-09-18 RX ADMIN — CYCLOBENZAPRINE 10 MG: 10 TABLET, FILM COATED ORAL at 21:04

## 2024-09-18 RX ADMIN — POLYETHYLENE GLYCOL 3350 17 G: 17 POWDER, FOR SOLUTION ORAL at 09:20

## 2024-09-18 RX ADMIN — ASPIRIN 325 MG: 325 TABLET, COATED ORAL at 09:21

## 2024-09-18 RX ADMIN — HYDROCODONE BITARTRATE AND ACETAMINOPHEN 1 TABLET: 5; 325 TABLET ORAL at 14:28

## 2024-09-18 ASSESSMENT — COGNITIVE AND FUNCTIONAL STATUS - GENERAL
DRESSING REGULAR UPPER BODY CLOTHING: A LITTLE
HELP NEEDED FOR BATHING: A LOT
CLIMB 3 TO 5 STEPS WITH RAILING: TOTAL
MOVING FROM LYING ON BACK TO SITTING ON SIDE OF FLAT BED WITH BEDRAILS: A LITTLE
DAILY ACTIVITIY SCORE: 20
STANDING UP FROM CHAIR USING ARMS: A LOT
CLIMB 3 TO 5 STEPS WITH RAILING: TOTAL
PERSONAL GROOMING: A LITTLE
DRESSING REGULAR LOWER BODY CLOTHING: A LOT
MOVING TO AND FROM BED TO CHAIR: A LOT
HELP NEEDED FOR BATHING: A LITTLE
MOVING FROM LYING ON BACK TO SITTING ON SIDE OF FLAT BED WITH BEDRAILS: A LITTLE
DRESSING REGULAR UPPER BODY CLOTHING: A LITTLE
TURNING FROM BACK TO SIDE WHILE IN FLAT BAD: A LITTLE
MOBILITY SCORE: 13
STANDING UP FROM CHAIR USING ARMS: A LOT
TURNING FROM BACK TO SIDE WHILE IN FLAT BAD: A LITTLE
MOBILITY SCORE: 13
WALKING IN HOSPITAL ROOM: A LOT
DAILY ACTIVITIY SCORE: 14
TOILETING: TOTAL
DRESSING REGULAR LOWER BODY CLOTHING: TOTAL
WALKING IN HOSPITAL ROOM: A LOT
MOVING TO AND FROM BED TO CHAIR: A LOT

## 2024-09-18 ASSESSMENT — PAIN SCALES - GENERAL
PAINLEVEL_OUTOF10: 5 - MODERATE PAIN
PAINLEVEL_OUTOF10: 3
PAINLEVEL_OUTOF10: 9
PAINLEVEL_OUTOF10: 4
PAINLEVEL_OUTOF10: 7
PAINLEVEL_OUTOF10: 6
PAINLEVEL_OUTOF10: 7
PAINLEVEL_OUTOF10: 7

## 2024-09-18 ASSESSMENT — ACTIVITIES OF DAILY LIVING (ADL): HOME_MANAGEMENT_TIME_ENTRY: 20

## 2024-09-18 ASSESSMENT — PAIN DESCRIPTION - ORIENTATION: ORIENTATION: RIGHT

## 2024-09-18 NOTE — PROGRESS NOTES
Occupational Therapy    Occupational Therapy Treatment    Name: Stephanie Alatorre  MRN: [id number]  Department: [facility]  Room: [id number]  Date: 09/18/24  Time Calculation  Start Time: 1139  Stop Time: 1220  Time Calculation (min): 41 min    Assessment:  OT Assessment: Patient showing good progress toward goals; would benefit from further OT for ADL's and mobility tasks  End of Session Communication: Bedside nurse  End of Session Patient Position: Up in chair, Alarm on; call-light within reach    Plan:  Treatment Interventions: ADL retraining, Functional transfer training, Endurance training, Equipment evaluation/education, Patient/family training, Compensatory technique education (NWB RLE precaution)  OT Frequency: 3 times per week  OT Discharge Recommendations: Moderate intensity level of continued care  OT - OK to Discharge: Yes (to next level of care when medically cleared by physician/medical team)    Subjective   Previous Visit Info:  OT Last Visit  OT Received On: 09/18/24    General:  General  Co-Treatment: PT  Co-Treatment Reason: to maximize safety during mobility tasks  Patient Position Received: Bed, 2 rail up, Alarm off, not on at start of session  General Comment: Patient seen for bedside treatment; cooperative, motivated    Precautions:  Medical Precautions: Fall precautions  Precautions Comment: NWB RLE; UE IV    Pain Assessment:  Pain Assessment  0-10 (Numeric) Pain Score: 3  Pain Type: Surgical pain  Pain Location: Ankle  Pain Orientation: Right     Objective   Cognition:  Overall Cognitive Status: Within Functional Limits (provided emotional)    Activities of Daily Living: LE Dressing  Sock Level of Assistance:  (SBA left sock while sitting EOB using compensatory technique:  bringing LE over opposite knee)    Bed Mobility/Transfers: Bed Mobility 1  Bed Mobility 1: Supine to sitting, Sitting to supine  Level of Assistance 1: Minimal verbal cues (SBA)  Bed Mobility Comments 1: HOB elevated, use of bed  rail    Transfers  Transfer: Yes  Transfer 1  Transfer From 1: Sit to, Stand to  Transfer to 1: Bed, Chair with arms  Transfer Device 1: Walker  Transfer Level of Assistance 1: Minimum assistance, Moderate verbal cues    Functional Mobility:  Functional Mobility 1  Device 1: Rolling walker  Assistance 1: Minimum assistance, Moderate verbal cues (of 2 staff)    Sitting Balance:  Static Sitting Balance  Static Sitting-Level of Assistance: Independent  Standing Balance:  Static Standing Balance  Static Standing-Level of Assistance: Minimum assistance    Outcome Measures:  [facility] Daily Activity  Putting on and taking off regular lower body clothing: Total  Bathing (including washing, rinsing, drying): A lot  Putting on and taking off regular upper body clothing: A little  Toileting, which includes using toilet, bedpan or urinal: Total  Taking care of personal grooming such as brushing teeth: A little  Eating Meals: None  Daily Activity - Total Score: 14    Education Documentation  Mobility Training, taught by Marina Courtney OT at 9/18/2024  5:28 PM.  Learner: Patient  Readiness: Acceptance  Method: Explanation  Response: Verbalizes Understanding, Demonstrated Understanding, Needs Reinforcement  Comment: to promote safety    Precautions, taught by Marina Courtney OT at 9/18/2024  5:28 PM.  Learner: Patient  Readiness: Acceptance  Method: Explanation  Response: Verbalizes Understanding, Demonstrated Understanding, Needs Reinforcement  Comment: to promote safety    Goals:  Encounter Problems       Encounter Problems (Active)       OT Goals       Patient will complete upper and lower body bathing/dressing; toileting with minimal assist using adaptive equipment as needed  (Progressing)       Start:  09/17/24    Expected End:  10/01/24            Patient will perform bed mobility and functional transfers safely with supervision: bed, chair, commode using DME as needed  (Progressing)       Start:  09/17/24    Expected End:   10/01/24            adhere to NWB RLE precaution during ADL's and functional transfers  (Progressing)       Start:  09/17/24    Expected End:  10/01/24            Patient will tolerate standing for 3 mins. and show overall good (-) standing balance during ADL's and functional transfers/mobility  (Progressing)       Start:  09/17/24    Expected End:  10/01/24

## 2024-09-18 NOTE — PROGRESS NOTES
"Stephanie Alatorre is a 53 y.o. female on day 3 of admission presenting with Closed fracture of right ankle.        Subjective   Patient seen this morning, no events overnight. She is close to making her mind about discharge planning and physical rehab, she was eating and in no acute distress.  Denies any f/c, n/v, new onset headaches, vision changes, chest pain, dyspnea, abdominal pain, changes in BM, or urinary changes.         Objective     Last Recorded Vitals  /65   Pulse 88   Temp 35.5 °C (95.9 °F)   Resp 16   Ht 1.575 m (5' 2.01\")   Wt (!) 171 kg (376 lb 15.4 oz)   LMP  (LMP Unknown) Comment: spiratic per patient hcg negative  SpO2 92%   BMI 68.93 kg/m²     Intake/Output last 3 Shifts:  I/O last 3 completed shifts:  In: 3770.7 (22.1 mL/kg) [P.O.:474; I.V.:3296.7 (19.3 mL/kg)]  Out: 2700 (15.8 mL/kg) [Urine:2700 (0.4 mL/kg/hr)]  Weight: 171 kg       =========RELEVANT RESULTS ==========  Labs  Lab Results   Component Value Date    WBC 7.9 09/17/2024    HGB 13.0 09/17/2024    HCT 38.9 09/17/2024    MCV 96 09/17/2024     09/17/2024     Lab Results   Component Value Date    GLUCOSE 92 09/17/2024    CALCIUM 8.5 (L) 09/17/2024     09/17/2024    K 3.4 (L) 09/17/2024    CO2 32 09/17/2024    CL 97 (L) 09/17/2024    BUN 12 09/17/2024    CREATININE 0.49 (L) 09/17/2024      Lab Results   Component Value Date     (H) 09/14/2024    AST 86 (H) 09/14/2024    ALKPHOS 104 09/14/2024    BILITOT 1.1 09/14/2024        Recent Echocardiogram (14D):   No echocardiogram results found for the past 14 days    TTE 12 month if available:  No echocardiogram results found for the past 12 months    Recent Imaging Results:  ECG 12 lead  Sinus tachycardia with Premature atrial complexes  Otherwise normal ECG  No previous ECGs available      Exam     GENERAL:   no distress, alert and cooperative  HEENT: Normal Inspection, Mucous membranes moist, No JVD, No Lymphadenopathy  CARDIOVASCULAR: RRR, no murmurs, 2+ equal " pulses of the extremities, normal S1 and S 2  RESPIRATORY: Patent airways, CTAB, thorax symmetric, No significant wheezing, Rales or Rhonchi  ABDOMEN: Soft, Non-Tender, Normal Bowel Sounds, No Distention  SKIN: Warm and dry, no lesions, no rashes  EXTREMITIES: normal extremities, no significant cyanosis edema, contusions or wounds, no obsvious clubbing  NEURO: A&O x 3, CN II-XII grossly intact  PSYCH: Appropriate mood and behavior    Additional Physical Exam Notes/Findings        ======= SCHEDULED MEDICATIONS =======  Scheduled medications   Medication Dose Route Frequency    aspirin  325 mg oral BID    ceFAZolin  2 g intravenous Once    enoxaparin  60 mg subcutaneous q12h RYAN    hydroCHLOROthiazide  12.5 mg oral Daily before breakfast    lidocaine  1 patch transdermal Daily    polyethylene glycol  17 g oral Daily       ========== PRN MEDICATIONS =========  acetaminophen, 650 mg, q6h PRN  acetaminophen, 650 mg, q4h PRN  bisacodyl, 10 mg, Daily PRN  cyclobenzaprine, 10 mg, TID PRN  diphenhydrAMINE, 25 mg, q6h PRN  guaiFENesin, 600 mg, q12h PRN  HYDROcodone-acetaminophen, 1 tablet, q4h PRN  melatonin, 3 mg, Nightly PRN  metoclopramide, 10 mg, q6h PRN   Or  metoclopramide, 10 mg, q6h PRN  morphine, 2 mg, q2h PRN  morphine, 2 mg, q4h PRN  morphine, 4 mg, q4h PRN  naloxone, 0.2 mg, q5 min PRN  naloxone, 0.2 mg, q5 min PRN  naloxone, 0.2 mg, q5 min PRN  naloxone, 0.2 mg, q5 min PRN  ondansetron, 4 mg, q6h PRN  ondansetron, 4 mg, q8h PRN   Or  ondansetron, 4 mg, q8h PRN  oxyCODONE, 10 mg, q4h PRN  polyethylene glycol, 17 g, Daily PRN        ==============  DIET  ==============  Dietary Orders (From admission, onward)       Start     Ordered    09/15/24 1612  Adult diet Regular  Diet effective now        Question:  Diet type  Answer:  Regular    09/15/24 6711                    ====== Assessment/Plan   =======    ASSESSMENT:  Principal Problem:    Closed fracture of right ankle  Active Problems:    Closed fracture of right  ankle, initial encounter    ___________________________________________________    Right ankle fracture s/p mechanical fall  Sinus tachycardia  HTN  Obesity        PLAN:    No major events overnight.   Focus on pain control  BP stable overnight WNL  HR improved.     DVT Prophylaxis  Subcutaneous lovenox    SUMMARY/DISPOSITION  Hemodynamically stable after surgery. PT/OT evaluation and discharge planning.               Tom Johnson, DO
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
## 2024-09-18 NOTE — PROGRESS NOTES
"Stephanie Alatorre is a 53 y.o. female on day 4 of admission presenting with Closed fracture of right ankle.        Subjective   Patient seen this morning, no events overnight.  Blood pressure was not controlled today, she was eating and in no acute distress.  Denies any f/c, n/v, new onset headaches, vision changes, chest pain, dyspnea, abdominal pain, changes in BM, or urinary changes.         Objective     Last Recorded Vitals  /71 (BP Location: Right arm, Patient Position: Lying)   Pulse 86   Temp 35.6 °C (96.1 °F) (Temporal)   Resp 18   Ht 1.575 m (5' 2.01\")   Wt (!) 171 kg (376 lb 15.4 oz)   LMP  (LMP Unknown) Comment: spiratic per patient hcg negative  SpO2 96%   BMI 68.93 kg/m²     Intake/Output last 3 Shifts:  I/O last 3 completed shifts:  In: 574 (3.4 mL/kg) [P.O.:474; IV Piggyback:100]  Out: 1800 (10.5 mL/kg) [Urine:1800 (0.3 mL/kg/hr)]  Weight: 171 kg       =========RELEVANT RESULTS ==========  Labs  Lab Results   Component Value Date    WBC 7.1 09/18/2024    HGB 12.5 09/18/2024    HCT 37.3 09/18/2024    MCV 96 09/18/2024     09/18/2024     Lab Results   Component Value Date    GLUCOSE 95 09/18/2024    CALCIUM 8.7 09/18/2024     09/18/2024    K 3.2 (L) 09/18/2024    CO2 33 (H) 09/18/2024    CL 97 (L) 09/18/2024    BUN 15 09/18/2024    CREATININE 0.43 (L) 09/18/2024      Lab Results   Component Value Date     (H) 09/14/2024    AST 86 (H) 09/14/2024    ALKPHOS 104 09/14/2024    BILITOT 1.1 09/14/2024        Recent Echocardiogram (14D):   No echocardiogram results found for the past 14 days    TTE 12 month if available:  No echocardiogram results found for the past 12 months    Recent Imaging Results:  ECG 12 lead  Sinus tachycardia with Premature atrial complexes  Otherwise normal ECG  No previous ECGs available      Exam     GENERAL:   no distress, alert and cooperative  HEENT: Normal Inspection, Mucous membranes moist, No JVD, No Lymphadenopathy  CARDIOVASCULAR: RRR, no " murmurs, 2+ equal pulses of the extremities, normal S1 and S 2  RESPIRATORY: Patent airways, CTAB, thorax symmetric, No significant wheezing, Rales or Rhonchi  ABDOMEN: Soft, Non-Tender, Normal Bowel Sounds, No Distention  SKIN: Warm and dry, no lesions, no rashes  EXTREMITIES: normal extremities, no significant cyanosis edema, contusions or wounds, no obsvious clubbing  NEURO: A&O x 3, CN II-XII grossly intact  PSYCH: Appropriate mood and behavior    Additional Physical Exam Notes/Findings        ======= SCHEDULED MEDICATIONS =======  Scheduled medications   Medication Dose Route Frequency    amLODIPine  5 mg oral Daily    aspirin  325 mg oral BID    ceFAZolin  2 g intravenous Once    enoxaparin  60 mg subcutaneous q12h RYAN    [START ON 9/19/2024] hydroCHLOROthiazide  25 mg oral Daily before breakfast    lidocaine  1 patch transdermal Daily    polyethylene glycol  17 g oral Daily       ========== PRN MEDICATIONS =========  acetaminophen, 650 mg, q6h PRN  acetaminophen, 650 mg, q4h PRN  bisacodyl, 10 mg, Daily PRN  cyclobenzaprine, 10 mg, TID PRN  diphenhydrAMINE, 25 mg, q6h PRN  guaiFENesin, 600 mg, q12h PRN  HYDROcodone-acetaminophen, 1 tablet, q4h PRN  melatonin, 3 mg, Nightly PRN  metoclopramide, 10 mg, q6h PRN   Or  metoclopramide, 10 mg, q6h PRN  morphine, 2 mg, q2h PRN  morphine, 2 mg, q4h PRN  morphine, 4 mg, q4h PRN  naloxone, 0.2 mg, q5 min PRN  naloxone, 0.2 mg, q5 min PRN  naloxone, 0.2 mg, q5 min PRN  naloxone, 0.2 mg, q5 min PRN  ondansetron, 4 mg, q6h PRN  ondansetron, 4 mg, q8h PRN   Or  ondansetron, 4 mg, q8h PRN  oxyCODONE, 10 mg, q4h PRN  polyethylene glycol, 17 g, Daily PRN        ==============  DIET  ==============  Dietary Orders (From admission, onward)       Start     Ordered    09/15/24 1612  Adult diet Regular  Diet effective now        Question:  Diet type  Answer:  Regular    09/15/24 1611                    ====== Assessment/Plan   =======    ASSESSMENT:  Principal Problem:    Closed  fracture of right ankle  Active Problems:    Closed fracture of right ankle, initial encounter    ___________________________________________________    Right ankle fracture s/p mechanical fall  Sinus tachycardia  HTN (Less controlled)  Obesity        PLAN:    No major events overnight.   Focus on pain control  BP elevated; added amlodipine and increased hydrochlorothiazide to 25 mg daily.   HR improved.     DVT Prophylaxis  Subcutaneous lovenox    SUMMARY/DISPOSITION  Hemodynamically stable after surgery. PT/OT evaluation and discharge planning.               Tom Johnson, DO

## 2024-09-19 LAB
ATRIAL RATE: 111 BPM
P AXIS: 60 DEGREES
P OFFSET: 195 MS
P ONSET: 146 MS
PR INTERVAL: 142 MS
Q ONSET: 217 MS
QRS COUNT: 19 BEATS
QRS DURATION: 80 MS
QT INTERVAL: 356 MS
QTC CALCULATION(BAZETT): 484 MS
QTC FREDERICIA: 437 MS
R AXIS: 44 DEGREES
T AXIS: 59 DEGREES
T OFFSET: 395 MS
VENTRICULAR RATE: 111 BPM

## 2024-09-19 PROCEDURE — 2500000001 HC RX 250 WO HCPCS SELF ADMINISTERED DRUGS (ALT 637 FOR MEDICARE OP): Performed by: INTERNAL MEDICINE

## 2024-09-19 PROCEDURE — 1100000001 HC PRIVATE ROOM DAILY

## 2024-09-19 PROCEDURE — 2500000004 HC RX 250 GENERAL PHARMACY W/ HCPCS (ALT 636 FOR OP/ED): Performed by: ORTHOPAEDIC SURGERY

## 2024-09-19 PROCEDURE — 97535 SELF CARE MNGMENT TRAINING: CPT | Mod: GP,CQ

## 2024-09-19 PROCEDURE — 99233 SBSQ HOSP IP/OBS HIGH 50: CPT | Performed by: INTERNAL MEDICINE

## 2024-09-19 PROCEDURE — 97116 GAIT TRAINING THERAPY: CPT | Mod: GP,CQ

## 2024-09-19 PROCEDURE — 2500000001 HC RX 250 WO HCPCS SELF ADMINISTERED DRUGS (ALT 637 FOR MEDICARE OP): Performed by: ORTHOPAEDIC SURGERY

## 2024-09-19 RX ORDER — LISINOPRIL 10 MG/1
10 TABLET ORAL DAILY
Status: DISCONTINUED | OUTPATIENT
Start: 2024-09-20 | End: 2024-09-26 | Stop reason: HOSPADM

## 2024-09-19 RX ADMIN — HYDROCHLOROTHIAZIDE 25 MG: 25 TABLET ORAL at 09:53

## 2024-09-19 RX ADMIN — AMLODIPINE BESYLATE 5 MG: 5 TABLET ORAL at 09:53

## 2024-09-19 RX ADMIN — OXYCODONE HYDROCHLORIDE 10 MG: 5 TABLET ORAL at 15:47

## 2024-09-19 RX ADMIN — CYCLOBENZAPRINE 10 MG: 10 TABLET, FILM COATED ORAL at 05:09

## 2024-09-19 RX ADMIN — OXYCODONE HYDROCHLORIDE 10 MG: 5 TABLET ORAL at 10:03

## 2024-09-19 RX ADMIN — ASPIRIN 325 MG: 325 TABLET, COATED ORAL at 09:53

## 2024-09-19 RX ADMIN — POLYETHYLENE GLYCOL 3350 17 G: 17 POWDER, FOR SOLUTION ORAL at 10:05

## 2024-09-19 RX ADMIN — OXYCODONE HYDROCHLORIDE 10 MG: 5 TABLET ORAL at 20:07

## 2024-09-19 RX ADMIN — CYCLOBENZAPRINE 10 MG: 10 TABLET, FILM COATED ORAL at 20:07

## 2024-09-19 RX ADMIN — ENOXAPARIN SODIUM 60 MG: 60 INJECTION SUBCUTANEOUS at 09:53

## 2024-09-19 RX ADMIN — OXYCODONE HYDROCHLORIDE 10 MG: 5 TABLET ORAL at 04:07

## 2024-09-19 RX ADMIN — ASPIRIN 325 MG: 325 TABLET, COATED ORAL at 20:07

## 2024-09-19 RX ADMIN — ENOXAPARIN SODIUM 60 MG: 60 INJECTION SUBCUTANEOUS at 20:07

## 2024-09-19 ASSESSMENT — PAIN SCALES - GENERAL
PAINLEVEL_OUTOF10: 5 - MODERATE PAIN
PAINLEVEL_OUTOF10: 4
PAINLEVEL_OUTOF10: 8
PAINLEVEL_OUTOF10: 7
PAINLEVEL_OUTOF10: 4
PAINLEVEL_OUTOF10: 4
PAINLEVEL_OUTOF10: 5 - MODERATE PAIN
PAINLEVEL_OUTOF10: 7

## 2024-09-19 ASSESSMENT — COGNITIVE AND FUNCTIONAL STATUS - GENERAL
DRESSING REGULAR UPPER BODY CLOTHING: A LITTLE
CLIMB 3 TO 5 STEPS WITH RAILING: TOTAL
MOVING FROM LYING ON BACK TO SITTING ON SIDE OF FLAT BED WITH BEDRAILS: A LITTLE
MOBILITY SCORE: 13
WALKING IN HOSPITAL ROOM: A LOT
TURNING FROM BACK TO SIDE WHILE IN FLAT BAD: A LITTLE
MOVING TO AND FROM BED TO CHAIR: A LOT
HELP NEEDED FOR BATHING: A LITTLE
TURNING FROM BACK TO SIDE WHILE IN FLAT BAD: A LITTLE
WALKING IN HOSPITAL ROOM: A LOT
TURNING FROM BACK TO SIDE WHILE IN FLAT BAD: A LITTLE
STANDING UP FROM CHAIR USING ARMS: A LOT
STANDING UP FROM CHAIR USING ARMS: A LITTLE
DAILY ACTIVITIY SCORE: 20
MOVING TO AND FROM BED TO CHAIR: A LITTLE
CLIMB 3 TO 5 STEPS WITH RAILING: TOTAL
HELP NEEDED FOR BATHING: A LITTLE
CLIMB 3 TO 5 STEPS WITH RAILING: TOTAL
MOVING TO AND FROM BED TO CHAIR: A LITTLE
MOVING FROM LYING ON BACK TO SITTING ON SIDE OF FLAT BED WITH BEDRAILS: A LITTLE
MOBILITY SCORE: 15
DRESSING REGULAR UPPER BODY CLOTHING: A LITTLE
DAILY ACTIVITIY SCORE: 20
DRESSING REGULAR LOWER BODY CLOTHING: A LOT
WALKING IN HOSPITAL ROOM: A LOT
STANDING UP FROM CHAIR USING ARMS: A LITTLE
MOBILITY SCORE: 15
MOVING FROM LYING ON BACK TO SITTING ON SIDE OF FLAT BED WITH BEDRAILS: A LITTLE
DRESSING REGULAR LOWER BODY CLOTHING: A LOT

## 2024-09-19 ASSESSMENT — PAIN - FUNCTIONAL ASSESSMENT
PAIN_FUNCTIONAL_ASSESSMENT: 0-10
PAIN_FUNCTIONAL_ASSESSMENT: 0-10

## 2024-09-19 ASSESSMENT — PAIN DESCRIPTION - LOCATION: LOCATION: ANKLE

## 2024-09-19 NOTE — PROGRESS NOTES
Met with patient at bedside. SNF list provided for patient to review. Patient chose [facility]. Requested referral be placed in care port via [facility].

## 2024-09-19 NOTE — PROGRESS NOTES
"Physical Therapy    Physical Therapy Treatment    Patient Name: Stephanie Aaltorre  MRN: [id number]  Department: [facility]  Room: [address] [place] [address]  Today's Date: 9/19/2024  Time Calculation  Start Time: 0850  Stop Time: 0915  Time Calculation (min): 25 min         Assessment/Plan   PT Assessment  Evaluation/Treatment Tolerance: Patient tolerated treatment well  End of Session Communication: Bedside nurse  End of Session Patient Position: Up in chair, Alarm on     PT Plan  Treatment/Interventions: Bed mobility, Transfer training, Gait training, Strengthening, Range of motion, Therapeutic exercise, Therapeutic activity  PT Plan: Ongoing PT  PT Frequency: Daily  PT Discharge Recommendations: Moderate intensity level of continued care  PT Recommended Transfer Status: Assist x2  PT - OK to Discharge: Yes (once medically cleared)      General Visit Information:   PT  Visit  PT Received On: 09/19/24  General  Prior to Session Communication: Bedside nurse  Patient Position Received: Bed, 3 rail up, Alarm off, not on at start of session  General Comment:  (pt very pleasant and agreeable to participate in therapy session)    Subjective   Precautions:  Precautions  LE Weight Bearing Status:  (RLE NWB)  Medical Precautions: Fall precautions  Precautions Comment:  (edna)          Objective   Pain:  Pain Assessment  Pain Assessment:  (pt c/o mild RLE pain though does not quantify)  Pain Interventions:  (cold packs provided end of tx session)     Treatments:  Therapeutic Exercise  Therapeutic Exercise Performed:  (seated LLE AP and BLE LAQ a few reps each)    Bed Mobility  Bed Mobility:  (sup > sit with SBA)    Ambulation/Gait Training  Ambulation/Gait Training Performed:  (pt ambulates 4 ft bed > chair, FWW and min A x 1.  attempted further distance initially however pt fatiguing and needing chair to be brought in closer.  very small \"hops\" on LLE;  pt adheres well to RLE NWB status.)    Transfers  Transfer:  (sit <> stand x9 trials  (x2 " from EOB and x7 from recliner chair).  FWW and min A x 1 initially however pt quickly progressing to SBA over subsequent trials.  cuing for safe hand placement and sequencing.  pt maintaining static stance up to 1 min. per trial.)    Outcome Measures:  [facility] Basic Mobility  Turning from your back to your side while in a flat bed without using bedrails: A little  Moving from lying on your back to sitting on the side of a flat bed without using bedrails: A little  Moving to and from bed to chair (including a wheelchair): A little  Standing up from a chair using your arms (e.g. wheelchair or bedside chair): A little  To walk in hospital room: A lot  Climbing 3-5 steps with railing: Total  Basic Mobility - Total Score: 15    Education Documentation  Mobility Training, taught by Fouzia Vidal PTA at 9/19/2024  2:17 PM.  Learner: Patient  Readiness: Acceptance  Method: Explanation  Response: Verbalizes Understanding    Education Comments  No comments found.        EDUCATION:       Encounter Problems       Encounter Problems (Active)       PT Problem       Pt will amb 100' using w/w with minAx1  (Progressing)       Start:  09/17/24    Expected End:  10/01/24             Pt will transfer STS with SBA  (Progressing)       Start:  09/17/24    Expected End:  10/01/24            Pt will perform a B LE ther ex program of 2-3 sets of 10  (Progressing)       Start:  09/17/24    Expected End:  10/01/24            Pt will transition supine <> sitting with SBA  (Progressing)       Start:  09/17/24    Expected End:  10/01/24

## 2024-09-19 NOTE — PROGRESS NOTES
POD 4    COMFORTABLE  SPLINT, [place] INTACT  STABLE  CONT NWB, PLAN  ECF AND F/U NEXT WEEK  WILL SEE PRN IN HOSPITAL

## 2024-09-20 LAB
ALBUMIN SERPL BCP-MCNC: 3.5 G/DL (ref 3.4–5)
ANION GAP SERPL CALC-SCNC: 12 MMOL/L (ref 10–20)
BUN SERPL-MCNC: 14 MG/DL (ref 6–23)
CALCIUM SERPL-MCNC: 8.9 MG/DL (ref 8.6–10.3)
CHLORIDE SERPL-SCNC: 97 MMOL/L (ref 98–107)
CO2 SERPL-SCNC: 30 MMOL/L (ref 21–32)
CREAT SERPL-MCNC: 0.43 MG/DL (ref 0.5–1.05)
EGFRCR SERPLBLD CKD-EPI 2021: >90 ML/MIN/1.73M*2
ERYTHROCYTE [DISTWIDTH] IN BLOOD BY AUTOMATED COUNT: 13.6 % (ref 11.5–14.5)
GLUCOSE SERPL-MCNC: 97 MG/DL (ref 74–99)
HCT VFR BLD AUTO: 39 % (ref 36–46)
HGB BLD-MCNC: 13.1 G/DL (ref 12–16)
MAGNESIUM SERPL-MCNC: 1.66 MG/DL (ref 1.6–2.4)
MCH RBC QN AUTO: 32.3 PG (ref 26–34)
MCHC RBC AUTO-ENTMCNC: 33.6 G/DL (ref 32–36)
MCV RBC AUTO: 96 FL (ref 80–100)
NRBC BLD-RTO: 0 /100 WBCS (ref 0–0)
PHOSPHATE SERPL-MCNC: 3.8 MG/DL (ref 2.5–4.9)
PLATELET # BLD AUTO: 186 X10*3/UL (ref 150–450)
POTASSIUM SERPL-SCNC: 3.7 MMOL/L (ref 3.5–5.3)
RBC # BLD AUTO: 4.06 X10*6/UL (ref 4–5.2)
SODIUM SERPL-SCNC: 135 MMOL/L (ref 136–145)
WBC # BLD AUTO: 8 X10*3/UL (ref 4.4–11.3)

## 2024-09-20 PROCEDURE — 2500000005 HC RX 250 GENERAL PHARMACY W/O HCPCS: Performed by: ORTHOPAEDIC SURGERY

## 2024-09-20 PROCEDURE — 99232 SBSQ HOSP IP/OBS MODERATE 35: CPT | Performed by: INTERNAL MEDICINE

## 2024-09-20 PROCEDURE — 85027 COMPLETE CBC AUTOMATED: CPT | Performed by: INTERNAL MEDICINE

## 2024-09-20 PROCEDURE — 83735 ASSAY OF MAGNESIUM: CPT | Performed by: INTERNAL MEDICINE

## 2024-09-20 PROCEDURE — 2500000004 HC RX 250 GENERAL PHARMACY W/ HCPCS (ALT 636 FOR OP/ED): Performed by: ORTHOPAEDIC SURGERY

## 2024-09-20 PROCEDURE — 80069 RENAL FUNCTION PANEL: CPT | Performed by: INTERNAL MEDICINE

## 2024-09-20 PROCEDURE — 1100000001 HC PRIVATE ROOM DAILY

## 2024-09-20 PROCEDURE — 2500000001 HC RX 250 WO HCPCS SELF ADMINISTERED DRUGS (ALT 637 FOR MEDICARE OP): Performed by: ORTHOPAEDIC SURGERY

## 2024-09-20 PROCEDURE — 97110 THERAPEUTIC EXERCISES: CPT | Mod: CQ,GP

## 2024-09-20 PROCEDURE — 36415 COLL VENOUS BLD VENIPUNCTURE: CPT | Performed by: INTERNAL MEDICINE

## 2024-09-20 PROCEDURE — 2500000001 HC RX 250 WO HCPCS SELF ADMINISTERED DRUGS (ALT 637 FOR MEDICARE OP): Performed by: INTERNAL MEDICINE

## 2024-09-20 RX ADMIN — OXYCODONE HYDROCHLORIDE 10 MG: 5 TABLET ORAL at 05:35

## 2024-09-20 RX ADMIN — ASPIRIN 325 MG: 325 TABLET, COATED ORAL at 11:00

## 2024-09-20 RX ADMIN — OXYCODONE HYDROCHLORIDE 10 MG: 5 TABLET ORAL at 01:08

## 2024-09-20 RX ADMIN — ENOXAPARIN SODIUM 60 MG: 60 INJECTION SUBCUTANEOUS at 20:25

## 2024-09-20 RX ADMIN — CYCLOBENZAPRINE 10 MG: 10 TABLET, FILM COATED ORAL at 20:30

## 2024-09-20 RX ADMIN — ASPIRIN 325 MG: 325 TABLET, COATED ORAL at 20:25

## 2024-09-20 RX ADMIN — OXYCODONE HYDROCHLORIDE 10 MG: 5 TABLET ORAL at 17:01

## 2024-09-20 RX ADMIN — OXYCODONE HYDROCHLORIDE 10 MG: 5 TABLET ORAL at 11:11

## 2024-09-20 RX ADMIN — ENOXAPARIN SODIUM 60 MG: 60 INJECTION SUBCUTANEOUS at 11:01

## 2024-09-20 RX ADMIN — HYDROCHLOROTHIAZIDE 25 MG: 25 TABLET ORAL at 11:00

## 2024-09-20 RX ADMIN — OXYCODONE HYDROCHLORIDE 10 MG: 5 TABLET ORAL at 21:41

## 2024-09-20 RX ADMIN — CYCLOBENZAPRINE 10 MG: 10 TABLET, FILM COATED ORAL at 05:35

## 2024-09-20 RX ADMIN — AMLODIPINE BESYLATE 5 MG: 5 TABLET ORAL at 11:01

## 2024-09-20 RX ADMIN — LIDOCAINE 1 PATCH: 4 PATCH TOPICAL at 11:05

## 2024-09-20 RX ADMIN — LISINOPRIL 10 MG: 10 TABLET ORAL at 11:18

## 2024-09-20 RX ADMIN — POLYETHYLENE GLYCOL 3350 17 G: 17 POWDER, FOR SOLUTION ORAL at 11:00

## 2024-09-20 ASSESSMENT — COGNITIVE AND FUNCTIONAL STATUS - GENERAL
WALKING IN HOSPITAL ROOM: A LOT
CLIMB 3 TO 5 STEPS WITH RAILING: TOTAL
MOVING FROM LYING ON BACK TO SITTING ON SIDE OF FLAT BED WITH BEDRAILS: A LITTLE
MOBILITY SCORE: 15
TURNING FROM BACK TO SIDE WHILE IN FLAT BAD: A LITTLE
STANDING UP FROM CHAIR USING ARMS: A LITTLE
MOVING TO AND FROM BED TO CHAIR: A LITTLE

## 2024-09-20 ASSESSMENT — PAIN DESCRIPTION - LOCATION
LOCATION: ANKLE

## 2024-09-20 ASSESSMENT — PAIN SCALES - GENERAL
PAINLEVEL_OUTOF10: 7
PAINLEVEL_OUTOF10: 7
PAINLEVEL_OUTOF10: 9
PAINLEVEL_OUTOF10: 8
PAINLEVEL_OUTOF10: 4
PAINLEVEL_OUTOF10: 3
PAINLEVEL_OUTOF10: 9
PAINLEVEL_OUTOF10: 7

## 2024-09-20 ASSESSMENT — PAIN - FUNCTIONAL ASSESSMENT: PAIN_FUNCTIONAL_ASSESSMENT: 0-10

## 2024-09-20 ASSESSMENT — PAIN DESCRIPTION - ORIENTATION
ORIENTATION: RIGHT

## 2024-09-20 NOTE — PROGRESS NOTES
Physical Therapy    Physical Therapy Treatment    Patient Name: Stephanie Alatorre  MRN: [id number]  Department: [facility]  Room: [address] [place] [address]  Today's Date: 9/20/2024  Time Calculation  Start Time: 1331  Stop Time: 1346  Time Calculation (min): 15 min         Assessment/Plan         PT Plan  Treatment/Interventions: Bed mobility, Transfer training, Gait training, Strengthening, Range of motion, Therapeutic exercise, Therapeutic activity  PT Plan: Ongoing PT  PT Frequency: Daily  PT Discharge Recommendations: Moderate intensity level of continued care  PT Recommended Transfer Status: Assist x2  PT - OK to Discharge: Yes (once medically cleared)      General Visit Information:   PT  Visit  PT Received On: 09/20/24       Subjective        Complained of dizziness            Objective   Pain:r ankle -no rating                             Treatments:  Therapeutic Exercise  Therapeutic Exercise Performed:  (supine ble quad sets and glut sets x 20 reps   rle heelslides,abd, x 20 reps)    Outcome Measures:  [facility] Basic Mobility  Turning from your back to your side while in a flat bed without using bedrails: A little  Moving from lying on your back to sitting on the side of a flat bed without using bedrails: A little  Moving to and from bed to chair (including a wheelchair): A little  Standing up from a chair using your arms (e.g. wheelchair or bedside chair): A little  To walk in hospital room: A lot  Climbing 3-5 steps with railing: Total  Basic Mobility - Total Score: 15    Education Documentation  Mobility Training, taught by Anna Escudero PTA at 9/20/2024  1:57 PM.  Learner: Patient  Readiness: Acceptance  Method: Demonstration  Response: Demonstrated Understanding    Precautions, taught by Anna Escudero PTA at 9/20/2024  1:57 PM.  Learner: Patient  Readiness: Acceptance  Method: Demonstration  Response: Demonstrated Understanding    Education Comments  No comments found.             Encounter Problems       Encounter  Problems (Active)       PT Problem       Pt will amb 100' using w/w with minAx1  (Progressing)       Start:  09/17/24    Expected End:  10/01/24             Pt will transfer STS with SBA  (Progressing)       Start:  09/17/24    Expected End:  10/01/24            Pt will perform a B LE ther ex program of 2-3 sets of 10  (Progressing)       Start:  09/17/24    Expected End:  10/01/24            Pt will transition supine <> sitting with SBA  (Progressing)       Start:  09/17/24    Expected End:  10/01/24               Pain - Adult

## 2024-09-20 NOTE — PROGRESS NOTES
"Stephanie Alatorre is a 53 y.o. female on day 5 of admission presenting with Closed fracture of right ankle.        Subjective   Patient seen this morning, no events overnight.  Blood pressure better controlled today, she was eating and in no acute distress.  Denies any f/c, n/v, new onset headaches, vision changes, chest pain, dyspnea, abdominal pain, changes in BM, or urinary changes.         Objective     Last Recorded Vitals  /83   Pulse 91   Temp 35.5 °C (95.9 °F)   Resp 18   Ht 1.575 m (5' 2.01\")   Wt (!) 171 kg (376 lb 15.4 oz)   LMP  (LMP Unknown) Comment: spiratic per patient hcg negative  SpO2 93%   BMI 68.93 kg/m²     Intake/Output last 3 Shifts:  I/O last 3 completed shifts:  In: 100 (0.6 mL/kg) [IV Piggyback:100]  Out: 3250 (19 mL/kg) [Urine:3250 (0.5 mL/kg/hr)]  Weight: 171 kg       =========RELEVANT RESULTS ==========  Labs  Lab Results   Component Value Date    WBC 7.1 09/18/2024    HGB 12.5 09/18/2024    HCT 37.3 09/18/2024    MCV 96 09/18/2024     09/18/2024     Lab Results   Component Value Date    GLUCOSE 95 09/18/2024    CALCIUM 8.7 09/18/2024     09/18/2024    K 3.2 (L) 09/18/2024    CO2 33 (H) 09/18/2024    CL 97 (L) 09/18/2024    BUN 15 09/18/2024    CREATININE 0.43 (L) 09/18/2024      Lab Results   Component Value Date     (H) 09/14/2024    AST 86 (H) 09/14/2024    ALKPHOS 104 09/14/2024    BILITOT 1.1 09/14/2024        Recent Echocardiogram (14D):   No echocardiogram results found for the past 14 days    TTE 12 month if available:  No echocardiogram results found for the past 12 months    Recent Imaging Results:  ECG 12 lead  Sinus tachycardia with Premature atrial complexes  Otherwise normal ECG  No previous ECGs available  See ED provider note for full interpretation and clinical correlation  Confirmed by Mildred Osman ([id number]) on 9/19/2024 6:56:54 PM      Exam     GENERAL:   no distress, alert and cooperative  HEENT: Normal Inspection, Mucous membranes " moist, No JVD, No Lymphadenopathy  CARDIOVASCULAR: RRR, no murmurs, 2+ equal pulses of the extremities, normal S1 and S 2  RESPIRATORY: Patent airways, CTAB, thorax symmetric, No significant wheezing, Rales or Rhonchi  ABDOMEN: Soft, Non-Tender, Normal Bowel Sounds, No Distention  SKIN: Warm and dry, no lesions, no rashes  EXTREMITIES: normal extremities, no significant cyanosis edema, contusions or wounds, no obsvious clubbing  NEURO: A&O x 3, CN II-XII grossly intact  PSYCH: Appropriate mood and behavior    Additional Physical Exam Notes/Findings        ======= SCHEDULED MEDICATIONS =======  Scheduled medications   Medication Dose Route Frequency    amLODIPine  5 mg oral Daily    aspirin  325 mg oral BID    ceFAZolin  2 g intravenous Once    enoxaparin  60 mg subcutaneous q12h RYAN    hydroCHLOROthiazide  25 mg oral Daily before breakfast    lidocaine  1 patch transdermal Daily    polyethylene glycol  17 g oral Daily       ========== PRN MEDICATIONS =========  acetaminophen, 650 mg, q6h PRN  acetaminophen, 650 mg, q4h PRN  bisacodyl, 10 mg, Daily PRN  cyclobenzaprine, 10 mg, TID PRN  diphenhydrAMINE, 25 mg, q6h PRN  guaiFENesin, 600 mg, q12h PRN  HYDROcodone-acetaminophen, 1 tablet, q4h PRN  melatonin, 3 mg, Nightly PRN  metoclopramide, 10 mg, q6h PRN   Or  metoclopramide, 10 mg, q6h PRN  morphine, 2 mg, q2h PRN  morphine, 2 mg, q4h PRN  morphine, 4 mg, q4h PRN  naloxone, 0.2 mg, q5 min PRN  naloxone, 0.2 mg, q5 min PRN  naloxone, 0.2 mg, q5 min PRN  naloxone, 0.2 mg, q5 min PRN  ondansetron, 4 mg, q6h PRN  ondansetron, 4 mg, q8h PRN   Or  ondansetron, 4 mg, q8h PRN  oxyCODONE, 10 mg, q4h PRN  polyethylene glycol, 17 g, Daily PRN        ==============  DIET  ==============  Dietary Orders (From admission, onward)       Start     Ordered    09/15/24 1612  Adult diet Regular  Diet effective now        Question:  Diet type  Answer:  Regular    09/15/24 1611                    ====== Assessment/Plan    =======    ASSESSMENT:  Principal Problem:    Closed fracture of right ankle  Active Problems:    Closed fracture of right ankle, initial encounter    ___________________________________________________    Right ankle fracture s/p mechanical fall  Sinus tachycardia  HTN (Less controlled)  Obesity        PLAN:    No major events overnight.   Focus on pain control  BP elevated; added amlodipine and increased hydrochlorothiazide to 25 mg daily, will add lisinopril as well.   HR improved.   Discussed with the patient the need for life style modification and weight loss as well as possible use of GLP1 for weight loss.    DVT Prophylaxis  Subcutaneous lovenox    SUMMARY/DISPOSITION  Hemodynamically stable after surgery. PT/OT evaluation and discharge planning.               Tom Johnson, DO

## 2024-09-20 NOTE — PROGRESS NOTES
Precert currently pending for [facility]. Awaiting approval. TCC to continue to follow for discharge planning.   TOI DONIS, RNTCC

## 2024-09-20 NOTE — PROGRESS NOTES
"Stephanie Alatorre is a 53 y.o. female on day 6 of admission presenting with Closed fracture of right ankle.    Subjective   The patient's pain is controlled.       Objective     Physical Exam    Last Recorded Vitals  Blood pressure 173/90, pulse 80, temperature 35.5 °C (95.9 °F), temperature source Temporal, resp. rate 18, height 1.575 m (5' 2.01\"), weight (!) 171 kg (376 lb 15.4 oz), SpO2 94%.  Intake/Output last 3 Shifts:  I/O last 3 completed shifts:  In: - (0 mL/kg)   Out: 4400 (25.7 mL/kg) [Urine:4400 (0.7 mL/kg/hr)]  Weight: 171 kg     Relevant Results               The right lower extremity splint is clean, dry and intact.  The toes are warm and well-perfused.  She is able to flex and extend the toes.               Assessment/Plan   Assessment & Plan  Closed fracture of right ankle    Closed fracture of right ankle, initial encounter    The patient is postoperative day 5 from her right ankle ORIF.  Overall, she appears to be stable.  She is awaiting rehab placement.  Continue with nonweightbearing upon the right lower extremity.  Plan for follow-up with Dr. Wyman in the office next week.             Jan Staton MD      "

## 2024-09-21 LAB
ALBUMIN SERPL BCP-MCNC: 3.3 G/DL (ref 3.4–5)
ANION GAP SERPL CALC-SCNC: 13 MMOL/L (ref 10–20)
BUN SERPL-MCNC: 19 MG/DL (ref 6–23)
CALCIUM SERPL-MCNC: 8.4 MG/DL (ref 8.6–10.3)
CHLORIDE SERPL-SCNC: 97 MMOL/L (ref 98–107)
CO2 SERPL-SCNC: 30 MMOL/L (ref 21–32)
CREAT SERPL-MCNC: 0.4 MG/DL (ref 0.5–1.05)
EGFRCR SERPLBLD CKD-EPI 2021: >90 ML/MIN/1.73M*2
ERYTHROCYTE [DISTWIDTH] IN BLOOD BY AUTOMATED COUNT: 14 % (ref 11.5–14.5)
GLUCOSE SERPL-MCNC: 87 MG/DL (ref 74–99)
HCT VFR BLD AUTO: 39 % (ref 36–46)
HGB BLD-MCNC: 13.1 G/DL (ref 12–16)
MAGNESIUM SERPL-MCNC: 1.84 MG/DL (ref 1.6–2.4)
MCH RBC QN AUTO: 32.5 PG (ref 26–34)
MCHC RBC AUTO-ENTMCNC: 33.6 G/DL (ref 32–36)
MCV RBC AUTO: 97 FL (ref 80–100)
NRBC BLD-RTO: 0 /100 WBCS (ref 0–0)
PHOSPHATE SERPL-MCNC: 4.4 MG/DL (ref 2.5–4.9)
PLATELET # BLD AUTO: 187 X10*3/UL (ref 150–450)
POTASSIUM SERPL-SCNC: 4 MMOL/L (ref 3.5–5.3)
RBC # BLD AUTO: 4.03 X10*6/UL (ref 4–5.2)
SODIUM SERPL-SCNC: 136 MMOL/L (ref 136–145)
WBC # BLD AUTO: 7.9 X10*3/UL (ref 4.4–11.3)

## 2024-09-21 PROCEDURE — 1100000001 HC PRIVATE ROOM DAILY

## 2024-09-21 PROCEDURE — 97530 THERAPEUTIC ACTIVITIES: CPT | Mod: GP,CQ

## 2024-09-21 PROCEDURE — 97116 GAIT TRAINING THERAPY: CPT | Mod: GP,CQ

## 2024-09-21 PROCEDURE — 2500000001 HC RX 250 WO HCPCS SELF ADMINISTERED DRUGS (ALT 637 FOR MEDICARE OP): Performed by: ORTHOPAEDIC SURGERY

## 2024-09-21 PROCEDURE — 85027 COMPLETE CBC AUTOMATED: CPT | Performed by: INTERNAL MEDICINE

## 2024-09-21 PROCEDURE — 2500000004 HC RX 250 GENERAL PHARMACY W/ HCPCS (ALT 636 FOR OP/ED): Performed by: ORTHOPAEDIC SURGERY

## 2024-09-21 PROCEDURE — 2500000001 HC RX 250 WO HCPCS SELF ADMINISTERED DRUGS (ALT 637 FOR MEDICARE OP): Performed by: INTERNAL MEDICINE

## 2024-09-21 PROCEDURE — 2500000005 HC RX 250 GENERAL PHARMACY W/O HCPCS: Performed by: ORTHOPAEDIC SURGERY

## 2024-09-21 PROCEDURE — 80069 RENAL FUNCTION PANEL: CPT | Performed by: INTERNAL MEDICINE

## 2024-09-21 PROCEDURE — 2500000004 HC RX 250 GENERAL PHARMACY W/ HCPCS (ALT 636 FOR OP/ED): Performed by: INTERNAL MEDICINE

## 2024-09-21 PROCEDURE — 36415 COLL VENOUS BLD VENIPUNCTURE: CPT | Performed by: INTERNAL MEDICINE

## 2024-09-21 PROCEDURE — 83735 ASSAY OF MAGNESIUM: CPT | Performed by: INTERNAL MEDICINE

## 2024-09-21 PROCEDURE — 99232 SBSQ HOSP IP/OBS MODERATE 35: CPT | Performed by: INTERNAL MEDICINE

## 2024-09-21 RX ADMIN — ENOXAPARIN SODIUM 60 MG: 60 INJECTION SUBCUTANEOUS at 08:36

## 2024-09-21 RX ADMIN — POLYETHYLENE GLYCOL 3350 17 G: 17 POWDER, FOR SOLUTION ORAL at 08:36

## 2024-09-21 RX ADMIN — ASPIRIN 325 MG: 325 TABLET, COATED ORAL at 21:35

## 2024-09-21 RX ADMIN — LIDOCAINE 1 PATCH: 4 PATCH TOPICAL at 08:36

## 2024-09-21 RX ADMIN — OXYCODONE HYDROCHLORIDE 10 MG: 5 TABLET ORAL at 19:22

## 2024-09-21 RX ADMIN — OXYCODONE HYDROCHLORIDE 10 MG: 5 TABLET ORAL at 08:35

## 2024-09-21 RX ADMIN — LISINOPRIL 10 MG: 10 TABLET ORAL at 08:37

## 2024-09-21 RX ADMIN — ASPIRIN 325 MG: 325 TABLET, COATED ORAL at 08:37

## 2024-09-21 RX ADMIN — HYDROCHLOROTHIAZIDE 25 MG: 25 TABLET ORAL at 06:25

## 2024-09-21 RX ADMIN — MORPHINE SULFATE 4 MG: 4 INJECTION, SOLUTION INTRAMUSCULAR; INTRAVENOUS at 12:12

## 2024-09-21 RX ADMIN — OXYCODONE HYDROCHLORIDE 10 MG: 5 TABLET ORAL at 03:12

## 2024-09-21 RX ADMIN — HYDROCODONE BITARTRATE AND ACETAMINOPHEN 1 TABLET: 5; 325 TABLET ORAL at 14:00

## 2024-09-21 RX ADMIN — ENOXAPARIN SODIUM 60 MG: 60 INJECTION SUBCUTANEOUS at 21:35

## 2024-09-21 RX ADMIN — OXYCODONE HYDROCHLORIDE 10 MG: 5 TABLET ORAL at 23:38

## 2024-09-21 RX ADMIN — CYCLOBENZAPRINE 10 MG: 10 TABLET, FILM COATED ORAL at 08:35

## 2024-09-21 RX ADMIN — CYCLOBENZAPRINE 10 MG: 10 TABLET, FILM COATED ORAL at 19:22

## 2024-09-21 RX ADMIN — AMLODIPINE BESYLATE 5 MG: 5 TABLET ORAL at 08:37

## 2024-09-21 ASSESSMENT — COGNITIVE AND FUNCTIONAL STATUS - GENERAL
STANDING UP FROM CHAIR USING ARMS: A LITTLE
MOVING FROM LYING ON BACK TO SITTING ON SIDE OF FLAT BED WITH BEDRAILS: A LITTLE
DRESSING REGULAR LOWER BODY CLOTHING: A LOT
CLIMB 3 TO 5 STEPS WITH RAILING: A LITTLE
PERSONAL GROOMING: A LOT
STANDING UP FROM CHAIR USING ARMS: A LITTLE
DAILY ACTIVITIY SCORE: 13
MOBILITY SCORE: 15
MOVING FROM LYING ON BACK TO SITTING ON SIDE OF FLAT BED WITH BEDRAILS: A LITTLE
MOBILITY SCORE: 16
MOVING TO AND FROM BED TO CHAIR: A LITTLE
TURNING FROM BACK TO SIDE WHILE IN FLAT BAD: A LITTLE
EATING MEALS: A LITTLE
MOVING TO AND FROM BED TO CHAIR: A LOT
TOILETING: A LOT
DRESSING REGULAR UPPER BODY CLOTHING: A LOT
TURNING FROM BACK TO SIDE WHILE IN FLAT BAD: A LITTLE
WALKING IN HOSPITAL ROOM: A LOT
HELP NEEDED FOR BATHING: A LOT
WALKING IN HOSPITAL ROOM: A LOT
CLIMB 3 TO 5 STEPS WITH RAILING: TOTAL

## 2024-09-21 ASSESSMENT — PAIN SCALES - GENERAL
PAINLEVEL_OUTOF10: 3
PAINLEVEL_OUTOF10: 3
PAINLEVEL_OUTOF10: 7
PAINLEVEL_OUTOF10: 6
PAINLEVEL_OUTOF10: 2
PAINLEVEL_OUTOF10: 9
PAINLEVEL_OUTOF10: 8
PAINLEVEL_OUTOF10: 5 - MODERATE PAIN

## 2024-09-21 ASSESSMENT — PAIN - FUNCTIONAL ASSESSMENT
PAIN_FUNCTIONAL_ASSESSMENT: 0-10

## 2024-09-21 ASSESSMENT — PAIN SCALES - PAIN ASSESSMENT IN ADVANCED DEMENTIA (PAINAD): TOTALSCORE: MEDICATION (SEE MAR)

## 2024-09-21 ASSESSMENT — PAIN DESCRIPTION - LOCATION
LOCATION: ANKLE

## 2024-09-21 ASSESSMENT — PAIN DESCRIPTION - ORIENTATION
ORIENTATION: RIGHT

## 2024-09-21 NOTE — CARE PLAN
The patient's goals for the shift include      The clinical goals for the shift include Pain control    Problem: Fall/Injury  Goal: Not fall by end of shift  Outcome: Progressing  Goal: Be free from injury by end of the shift  Outcome: Progressing  Goal: Verbalize understanding of personal risk factors for fall in the hospital  Outcome: Progressing  Goal: Verbalize understanding of risk factor reduction measures to prevent injury from fall in the home  Outcome: Progressing  Goal: Use assistive devices by end of the shift  Outcome: Progressing  Goal: Pace activities to prevent fatigue by end of the shift  Outcome: Progressing     Problem: Pain  Goal: Takes deep breaths with improved pain control throughout the shift  Outcome: Progressing  Goal: Turns in bed with improved pain control throughout the shift  Outcome: Progressing  Goal: Walks with improved pain control throughout the shift  Outcome: Progressing  Goal: Performs ADL's with improved pain control throughout shift  Outcome: Progressing  Goal: Participates in PT with improved pain control throughout the shift  Outcome: Progressing  Goal: Free from opioid side effects throughout the shift  Outcome: Progressing  Goal: Free from acute confusion related to pain meds throughout the shift  Outcome: Progressing     Problem: Skin  Goal: Decreased wound size/increased tissue granulation at next dressing change  Outcome: Progressing  Flowsheets (Taken 9/20/2024 2151)  Decreased wound size/increased tissue granulation at next dressing change: Promote sleep for wound healing  Goal: Participates in plan/prevention/treatment measures  Outcome: Progressing  Flowsheets (Taken 9/20/2024 2151)  Participates in plan/prevention/treatment measures:   Elevate heels   Increase activity/out of bed for meals  Goal: Prevent/manage excess moisture  Outcome: Progressing  Flowsheets (Taken 9/20/2024 2151)  Prevent/manage excess moisture:   Moisturize dry skin   Follow provider orders for  dressing changes   Monitor for/manage infection if present  Goal: Prevent/minimize sheer/friction injuries  Outcome: Progressing  Flowsheets (Taken 9/20/2024 2151)  Prevent/minimize sheer/friction injuries:   Use pull sheet   Increase activity/out of bed for meals  Goal: Promote/optimize nutrition  Outcome: Progressing  Flowsheets (Taken 9/20/2024 2151)  Promote/optimize nutrition:   Monitor/record intake including meals   Offer water/supplements/favorite foods  Goal: Promote skin healing  Outcome: Progressing  Flowsheets (Taken 9/20/2024 2151)  Promote skin healing: Assess skin/pad under line(s)/device(s)     Problem: Pain - Adult  Goal: Verbalizes/displays adequate comfort level or baseline comfort level  Outcome: Progressing     Problem: Safety - Adult  Goal: Free from fall injury  Outcome: Progressing     Problem: Discharge Planning  Goal: Discharge to home or other facility with appropriate resources  Outcome: Progressing     Problem: Chronic Conditions and Co-morbidities  Goal: Patient's chronic conditions and co-morbidity symptoms are monitored and maintained or improved  Outcome: Progressing

## 2024-09-21 NOTE — PROGRESS NOTES
"Stephanie Alatorre is a 53 y.o. female on day 6 of admission presenting with Closed fracture of right ankle.        Subjective   Patient seen this morning, no events overnight.  Blood pressure better controlled today, she was eating and in no acute distress.  Denies any f/c, n/v, new onset headaches, vision changes, chest pain, dyspnea, abdominal pain, changes in BM, or urinary changes.         Objective     Last Recorded Vitals  /61 (BP Location: Right arm, Patient Position: Lying)   Pulse 77   Temp 34.9 °C (94.8 °F) (Temporal)   Resp 16   Ht 1.575 m (5' 2.01\")   Wt (!) 171 kg (376 lb 15.4 oz)   LMP  (LMP Unknown) Comment: spiratic per patient hcg negative  SpO2 95%   BMI 68.93 kg/m²     Intake/Output last 3 Shifts:  I/O last 3 completed shifts:  In: 360 (2.1 mL/kg) [P.O.:360]  Out: 5700 (33.3 mL/kg) [Urine:5700 (0.9 mL/kg/hr)]  Weight: 171 kg       =========RELEVANT RESULTS ==========  Labs  Lab Results   Component Value Date    WBC 8.0 09/20/2024    HGB 13.1 09/20/2024    HCT 39.0 09/20/2024    MCV 96 09/20/2024     09/20/2024     Lab Results   Component Value Date    GLUCOSE 97 09/20/2024    CALCIUM 8.9 09/20/2024     (L) 09/20/2024    K 3.7 09/20/2024    CO2 30 09/20/2024    CL 97 (L) 09/20/2024    BUN 14 09/20/2024    CREATININE 0.43 (L) 09/20/2024      Lab Results   Component Value Date     (H) 09/14/2024    AST 86 (H) 09/14/2024    ALKPHOS 104 09/14/2024    BILITOT 1.1 09/14/2024        Recent Echocardiogram (14D):   No echocardiogram results found for the past 14 days    TTE 12 month if available:  No echocardiogram results found for the past 12 months    Recent Imaging Results:  ECG 12 lead  Sinus tachycardia with Premature atrial complexes  Otherwise normal ECG  No previous ECGs available  See ED provider note for full interpretation and clinical correlation  Confirmed by Mildred Osman ([id number]) on 9/19/2024 6:56:54 PM      Exam     GENERAL:   no distress, alert and " cooperative  HEENT: Normal Inspection, Mucous membranes moist, No JVD, No Lymphadenopathy  CARDIOVASCULAR: RRR, no murmurs, 2+ equal pulses of the extremities, normal S1 and S 2  RESPIRATORY: Patent airways, CTAB, thorax symmetric, No significant wheezing, Rales or Rhonchi  ABDOMEN: Soft, Non-Tender, Normal Bowel Sounds, No Distention  SKIN: Warm and dry, no lesions, no rashes  EXTREMITIES: normal extremities, no significant cyanosis edema, contusions or wounds, no obsvious clubbing  NEURO: A&O x 3, CN II-XII grossly intact  PSYCH: Appropriate mood and behavior    Additional Physical Exam Notes/Findings        ======= SCHEDULED MEDICATIONS =======  Scheduled medications   Medication Dose Route Frequency    amLODIPine  5 mg oral Daily    aspirin  325 mg oral BID    ceFAZolin  2 g intravenous Once    enoxaparin  60 mg subcutaneous q12h RYAN    hydroCHLOROthiazide  25 mg oral Daily before breakfast    lidocaine  1 patch transdermal Daily    lisinopril  10 mg oral Daily    polyethylene glycol  17 g oral Daily       ========== PRN MEDICATIONS =========  acetaminophen, 650 mg, q6h PRN  acetaminophen, 650 mg, q4h PRN  bisacodyl, 10 mg, Daily PRN  cyclobenzaprine, 10 mg, TID PRN  diphenhydrAMINE, 25 mg, q6h PRN  guaiFENesin, 600 mg, q12h PRN  HYDROcodone-acetaminophen, 1 tablet, q4h PRN  melatonin, 3 mg, Nightly PRN  metoclopramide, 10 mg, q6h PRN   Or  metoclopramide, 10 mg, q6h PRN  morphine, 2 mg, q2h PRN  morphine, 2 mg, q4h PRN  morphine, 4 mg, q4h PRN  naloxone, 0.2 mg, q5 min PRN  naloxone, 0.2 mg, q5 min PRN  naloxone, 0.2 mg, q5 min PRN  naloxone, 0.2 mg, q5 min PRN  ondansetron, 4 mg, q6h PRN  ondansetron, 4 mg, q8h PRN   Or  ondansetron, 4 mg, q8h PRN  oxyCODONE, 10 mg, q4h PRN  polyethylene glycol, 17 g, Daily PRN        ==============  DIET  ==============  Dietary Orders (From admission, onward)       Start     Ordered    09/15/24 1612  Adult diet Regular  Diet effective now        Question:  Diet type  Answer:   Regular    09/15/24 1611                    ====== Assessment/Plan   =======    ASSESSMENT:  Principal Problem:    Closed fracture of right ankle  Active Problems:    Closed fracture of right ankle, initial encounter    ___________________________________________________    Right ankle fracture s/p mechanical fall  Sinus tachycardia  HTN (Less controlled)  Obesity        PLAN:    No major events overnight.   Focus on pain control  BP elevated; added amlodipine and increased hydrochlorothiazide to 25 mg daily, lisinopril added with better control.   HR improved.   Discussed with the patient the need for life style modification and weight loss as well as possible use of GLP1 for weight loss.    DVT Prophylaxis  Subcutaneous lovenox    SUMMARY/DISPOSITION  Hemodynamically stable after surgery. Patient pending placement.                Tom Johnson, DO

## 2024-09-21 NOTE — PROGRESS NOTES
Physical Therapy    Physical Therapy Treatment    Patient Name: Stephanie Alatorre  MRN: [id number]  Department: [facility]  Room: [address] [place] [address]  Today's Date: 9/21/2024  Time Calculation  Start Time: 1120  Stop Time: 1158  Time Calculation (min): 38 min         Assessment/Plan   PT Assessment  End of Session Communication: Bedside nurse  End of Session Patient Position: Up in chair, Alarm off, not on at start of session (Seated in recliner with BLE elevated with pillow, cold pack right ankle, call bell in reach.)     PT Plan  Treatment/Interventions: Bed mobility, Transfer training, Gait training, Strengthening, Range of motion, Therapeutic exercise, Therapeutic activity  PT Plan: Ongoing PT  PT Frequency: Daily  PT Discharge Recommendations: Moderate intensity level of continued care  PT Recommended Transfer Status: Assist x2  PT - OK to Discharge: Yes (once medically cleared)      General Visit Information:   PT  Visit  PT Received On: 09/21/24  General  Prior to Session Communication: Bedside nurse  Patient Position Received: Bed, 4 rail up, Alarm off, not on at start of session  General Comment:  (Pt states feeling anxious, but willing to participate in PT session.)    Subjective   Precautions:  Precautions  LE Weight Bearing Status: Right Non-Weight Bearing  Medical Precautions: Fall precautions  Precautions Comment: NWB RLE    Vital Signs (Past 2hrs)                 Objective   Pain:  Pain Assessment  Pain Assessment: 0-10  0-10 (Numeric) Pain Score: 5 - Moderate pain  Pain Type: Surgical pain  Pain Location: Ankle  Pain Orientation: Right  Pain Interventions:  (RN notified for pain meds; cold pack provided right ankle post PT session.)        Postural Control:  Static Standing Balance  Static Standing-Comment/Number of Minutes:  (Pt tolerated static standing ~1-2 min x5 trials with FWW and MinAx1; seated rest breaks b/t trials d/t fatigue.)       Treatments:  Bed Mobility  Bed Mobility: Yes (Pt performed sup>sit with HOB  elevated and use of bedrail with. SBA)    Ambulation/Gait Training  Ambulation/Gait Training Performed: Yes (Pt able to amb ~2' EOB>recliner with FWW and MinAx2 while maintainiing RLE NWB precautions; provided demo and v/c for proper sequencing safety with AD.)  Transfers  Transfer: Yes (Pt performed sit<>stand x5 trials with FWW and Min/CGAx1; provided v/c for proper hand placement safety. Seated rest breaks b/t trials d/t fatigue.)    Outcome Measures:  [facility] Basic Mobility  Turning from your back to your side while in a flat bed without using bedrails: A little  Moving from lying on your back to sitting on the side of a flat bed without using bedrails: A little  Moving to and from bed to chair (including a wheelchair): A little  Standing up from a chair using your arms (e.g. wheelchair or bedside chair): A little  To walk in hospital room: A lot  Climbing 3-5 steps with railing: Total  Basic Mobility - Total Score: 15    Education Documentation  Mobility Training, taught by Etienne Duenas PTA at 9/21/2024 12:34 PM.  Learner: Patient  Readiness: Acceptance  Method: Explanation  Response: Verbalizes Understanding    Education Comments  No comments found.        OP EDUCATION:       Encounter Problems       Encounter Problems (Active)       PT Problem       Pt will amb 100' using w/w with minAx1  (Progressing)       Start:  09/17/24    Expected End:  10/01/24             Pt will transfer STS with SBA  (Progressing)       Start:  09/17/24    Expected End:  10/01/24            Pt will perform a B LE ther ex program of 2-3 sets of 10  (Progressing)       Start:  09/17/24    Expected End:  10/01/24            Pt will transition supine <> sitting with SBA  (Progressing)       Start:  09/17/24    Expected End:  10/01/24               Pain - Adult

## 2024-09-22 VITALS
WEIGHT: 293 LBS | TEMPERATURE: 96.4 F | SYSTOLIC BLOOD PRESSURE: 133 MMHG | HEIGHT: 62 IN | OXYGEN SATURATION: 97 % | DIASTOLIC BLOOD PRESSURE: 62 MMHG | HEART RATE: 79 BPM | RESPIRATION RATE: 18 BRPM | BODY MASS INDEX: 53.92 KG/M2

## 2024-09-22 LAB
ALBUMIN SERPL BCP-MCNC: 3.4 G/DL (ref 3.4–5)
ANION GAP SERPL CALC-SCNC: 13 MMOL/L (ref 10–20)
BUN SERPL-MCNC: 22 MG/DL (ref 6–23)
CALCIUM SERPL-MCNC: 8.4 MG/DL (ref 8.6–10.3)
CHLORIDE SERPL-SCNC: 96 MMOL/L (ref 98–107)
CO2 SERPL-SCNC: 28 MMOL/L (ref 21–32)
CREAT SERPL-MCNC: 0.51 MG/DL (ref 0.5–1.05)
EGFRCR SERPLBLD CKD-EPI 2021: >90 ML/MIN/1.73M*2
ERYTHROCYTE [DISTWIDTH] IN BLOOD BY AUTOMATED COUNT: 13.9 % (ref 11.5–14.5)
GLUCOSE SERPL-MCNC: 95 MG/DL (ref 74–99)
HCT VFR BLD AUTO: 39.3 % (ref 36–46)
HGB BLD-MCNC: 12.7 G/DL (ref 12–16)
MAGNESIUM SERPL-MCNC: 1.85 MG/DL (ref 1.6–2.4)
MCH RBC QN AUTO: 31.4 PG (ref 26–34)
MCHC RBC AUTO-ENTMCNC: 32.3 G/DL (ref 32–36)
MCV RBC AUTO: 97 FL (ref 80–100)
NRBC BLD-RTO: 0 /100 WBCS (ref 0–0)
PHOSPHATE SERPL-MCNC: 4.6 MG/DL (ref 2.5–4.9)
PLATELET # BLD AUTO: 193 X10*3/UL (ref 150–450)
POTASSIUM SERPL-SCNC: 3.9 MMOL/L (ref 3.5–5.3)
RBC # BLD AUTO: 4.05 X10*6/UL (ref 4–5.2)
SODIUM SERPL-SCNC: 133 MMOL/L (ref 136–145)
WBC # BLD AUTO: 7.9 X10*3/UL (ref 4.4–11.3)

## 2024-09-22 PROCEDURE — 85027 COMPLETE CBC AUTOMATED: CPT | Performed by: INTERNAL MEDICINE

## 2024-09-22 PROCEDURE — 97110 THERAPEUTIC EXERCISES: CPT | Mod: CQ,GP

## 2024-09-22 PROCEDURE — 83735 ASSAY OF MAGNESIUM: CPT | Performed by: INTERNAL MEDICINE

## 2024-09-22 PROCEDURE — 2500000004 HC RX 250 GENERAL PHARMACY W/ HCPCS (ALT 636 FOR OP/ED): Performed by: ORTHOPAEDIC SURGERY

## 2024-09-22 PROCEDURE — 2500000004 HC RX 250 GENERAL PHARMACY W/ HCPCS (ALT 636 FOR OP/ED): Performed by: INTERNAL MEDICINE

## 2024-09-22 PROCEDURE — 97535 SELF CARE MNGMENT TRAINING: CPT | Mod: CQ,GP

## 2024-09-22 PROCEDURE — 2500000001 HC RX 250 WO HCPCS SELF ADMINISTERED DRUGS (ALT 637 FOR MEDICARE OP): Performed by: INTERNAL MEDICINE

## 2024-09-22 PROCEDURE — 80069 RENAL FUNCTION PANEL: CPT | Performed by: INTERNAL MEDICINE

## 2024-09-22 PROCEDURE — 1100000001 HC PRIVATE ROOM DAILY

## 2024-09-22 PROCEDURE — 36415 COLL VENOUS BLD VENIPUNCTURE: CPT | Performed by: INTERNAL MEDICINE

## 2024-09-22 PROCEDURE — 99232 SBSQ HOSP IP/OBS MODERATE 35: CPT | Performed by: INTERNAL MEDICINE

## 2024-09-22 PROCEDURE — 2500000005 HC RX 250 GENERAL PHARMACY W/O HCPCS: Performed by: ORTHOPAEDIC SURGERY

## 2024-09-22 PROCEDURE — 2500000001 HC RX 250 WO HCPCS SELF ADMINISTERED DRUGS (ALT 637 FOR MEDICARE OP): Performed by: ORTHOPAEDIC SURGERY

## 2024-09-22 RX ORDER — OXYCODONE HYDROCHLORIDE 5 MG/1
5 TABLET ORAL EVERY 4 HOURS PRN
Status: DISCONTINUED | OUTPATIENT
Start: 2024-09-22 | End: 2024-09-26 | Stop reason: HOSPADM

## 2024-09-22 RX ADMIN — POLYETHYLENE GLYCOL 3350 17 G: 17 POWDER, FOR SOLUTION ORAL at 08:08

## 2024-09-22 RX ADMIN — OXYCODONE HYDROCHLORIDE 10 MG: 5 TABLET ORAL at 20:05

## 2024-09-22 RX ADMIN — ASPIRIN 325 MG: 325 TABLET, COATED ORAL at 08:08

## 2024-09-22 RX ADMIN — OXYCODONE HYDROCHLORIDE 10 MG: 5 TABLET ORAL at 08:08

## 2024-09-22 RX ADMIN — HYDROCHLOROTHIAZIDE 25 MG: 25 TABLET ORAL at 08:08

## 2024-09-22 RX ADMIN — OXYCODONE HYDROCHLORIDE 10 MG: 5 TABLET ORAL at 04:13

## 2024-09-22 RX ADMIN — ENOXAPARIN SODIUM 60 MG: 60 INJECTION SUBCUTANEOUS at 20:05

## 2024-09-22 RX ADMIN — OXYCODONE HYDROCHLORIDE 10 MG: 5 TABLET ORAL at 13:20

## 2024-09-22 RX ADMIN — LISINOPRIL 10 MG: 10 TABLET ORAL at 08:08

## 2024-09-22 RX ADMIN — ENOXAPARIN SODIUM 60 MG: 60 INJECTION SUBCUTANEOUS at 08:08

## 2024-09-22 RX ADMIN — MORPHINE SULFATE 4 MG: 4 INJECTION, SOLUTION INTRAMUSCULAR; INTRAVENOUS at 22:27

## 2024-09-22 RX ADMIN — CYCLOBENZAPRINE 10 MG: 10 TABLET, FILM COATED ORAL at 04:14

## 2024-09-22 RX ADMIN — CYCLOBENZAPRINE 10 MG: 10 TABLET, FILM COATED ORAL at 20:05

## 2024-09-22 RX ADMIN — LIDOCAINE 1 PATCH: 4 PATCH TOPICAL at 08:08

## 2024-09-22 RX ADMIN — AMLODIPINE BESYLATE 5 MG: 5 TABLET ORAL at 08:08

## 2024-09-22 RX ADMIN — ASPIRIN 325 MG: 325 TABLET, COATED ORAL at 20:05

## 2024-09-22 ASSESSMENT — COGNITIVE AND FUNCTIONAL STATUS - GENERAL
MOBILITY SCORE: 15
STANDING UP FROM CHAIR USING ARMS: A LITTLE
CLIMB 3 TO 5 STEPS WITH RAILING: TOTAL
MOVING TO AND FROM BED TO CHAIR: A LITTLE
TURNING FROM BACK TO SIDE WHILE IN FLAT BAD: A LITTLE
MOVING FROM LYING ON BACK TO SITTING ON SIDE OF FLAT BED WITH BEDRAILS: A LITTLE
WALKING IN HOSPITAL ROOM: A LOT

## 2024-09-22 ASSESSMENT — PAIN SCALES - GENERAL
PAINLEVEL_OUTOF10: 7
PAINLEVEL_OUTOF10: 7
PAINLEVEL_OUTOF10: 3
PAINLEVEL_OUTOF10: 7
PAINLEVEL_OUTOF10: 6
PAINLEVEL_OUTOF10: 3
PAINLEVEL_OUTOF10: 0 - NO PAIN
PAINLEVEL_OUTOF10: 7
PAINLEVEL_OUTOF10: 7

## 2024-09-22 ASSESSMENT — PAIN - FUNCTIONAL ASSESSMENT
PAIN_FUNCTIONAL_ASSESSMENT: 0-10

## 2024-09-22 ASSESSMENT — PAIN DESCRIPTION - LOCATION
LOCATION: ANKLE

## 2024-09-22 ASSESSMENT — PAIN DESCRIPTION - ORIENTATION
ORIENTATION: RIGHT

## 2024-09-22 NOTE — PROGRESS NOTES
Physical Therapy    Physical Therapy Treatment    Patient Name: Stephanie Alatorre  MRN: [id number]  Department: [facility]  Room: [address] [place] [address]  Today's Date: 9/22/2024  Time Calculation  Start Time: 0931  Stop Time: 1001  Time Calculation (min): 30 min         Assessment/Plan         PT Plan  Treatment/Interventions: Bed mobility, Transfer training, Gait training, Strengthening, Range of motion, Therapeutic exercise, Therapeutic activity  PT Plan: Ongoing PT  PT Frequency: Daily  PT Discharge Recommendations: Moderate intensity level of continued care  PT Recommended Transfer Status: Assist x2  PT - OK to Discharge: Yes (once medically cleared)      General Visit Information:   PT  Visit  PT Received On: 09/22/24       Subjective                  Objective   Pain:r ankle -no rating                          Treatments:  Therapeutic Exercise  Therapeutic Exercise Performed:  (sitting ble ap's, laqs,marching  supine heelsildes 2 x 10 reps)    Bed Mobility  Bed Mobility:  (supine to sit sba. sit to supine mod a x 1) sat unsupported on edge of bed 15minutes sba   Transfers: sit to stand min/cga x 1 . Stood one minute x 2 with walker cga  nwb rle         Outcome Measures:  [facility] Basic Mobility  Turning from your back to your side while in a flat bed without using bedrails: A little  Moving from lying on your back to sitting on the side of a flat bed without using bedrails: A little  Moving to and from bed to chair (including a wheelchair): A little  Standing up from a chair using your arms (e.g. wheelchair or bedside chair): A little  To walk in hospital room: A lot  Climbing 3-5 steps with railing: Total  Basic Mobility - Total Score: 15    Education Documentation  Mobility Training, taught by Anna Escudero PTA at 9/22/2024  1:08 PM.  Learner: Patient  Readiness: Acceptance  Method: Demonstration  Response: Demonstrated Understanding    Precautions, taught by Anna Escudero PTA at 9/22/2024  1:08 PM.  Learner:  Patient  Readiness: Acceptance  Method: Demonstration  Response: Demonstrated Understanding    Education Comments  No comments found.             Encounter Problems       Encounter Problems (Active)       PT Problem       Pt will amb 100' using w/w with minAx1  (Progressing)       Start:  09/17/24    Expected End:  10/01/24             Pt will transfer STS with SBA  (Progressing)       Start:  09/17/24    Expected End:  10/01/24            Pt will perform a B LE ther ex program of 2-3 sets of 10  (Progressing)       Start:  09/17/24    Expected End:  10/01/24            Pt will transition supine <> sitting with SBA  (Progressing)       Start:  09/17/24    Expected End:  10/01/24               Pain - Adult

## 2024-09-22 NOTE — CARE PLAN
The patient's goals for the shift include comfort     The clinical goals for the shift include pt to remain HDS throughout duration of shift

## 2024-09-22 NOTE — PROGRESS NOTES
"Stephanie Alatorre is a 53 y.o. female on day 8 of admission presenting with Closed fracture of right ankle.        Subjective   Patient seen this morning, no events overnight.  Blood pressure remains controlled today, she was in no acute distress.  Denies any f/c, n/v, new onset headaches, vision changes, chest pain, dyspnea, abdominal pain, changes in BM, or urinary changes.         Objective     Last Recorded Vitals  /54 (BP Location: Right arm, Patient Position: Lying)   Pulse 82   Temp 36.2 °C (97.2 °F) (Temporal)   Resp 18   Ht 1.575 m (5' 2.01\")   Wt (!) 171 kg (376 lb 15.4 oz)   LMP  (LMP Unknown) Comment: spiratic per patient hcg negative  SpO2 94%   BMI 68.93 kg/m²     Intake/Output last 3 Shifts:  I/O last 3 completed shifts:  In: - (0 mL/kg)   Out: 3700 (21.6 mL/kg) [Urine:3700 (0.6 mL/kg/hr)]  Weight: 171 kg       =========RELEVANT RESULTS ==========  Labs  Lab Results   Component Value Date    WBC 7.9 09/22/2024    HGB 12.7 09/22/2024    HCT 39.3 09/22/2024    MCV 97 09/22/2024     09/22/2024     Lab Results   Component Value Date    GLUCOSE 95 09/22/2024    CALCIUM 8.4 (L) 09/22/2024     (L) 09/22/2024    K 3.9 09/22/2024    CO2 28 09/22/2024    CL 96 (L) 09/22/2024    BUN 22 09/22/2024    CREATININE 0.51 09/22/2024      Lab Results   Component Value Date     (H) 09/14/2024    AST 86 (H) 09/14/2024    ALKPHOS 104 09/14/2024    BILITOT 1.1 09/14/2024        Recent Echocardiogram (14D):   No echocardiogram results found for the past 14 days    TTE 12 month if available:  No echocardiogram results found for the past 12 months    Recent Imaging Results:  ECG 12 lead  Sinus tachycardia with Premature atrial complexes  Otherwise normal ECG  No previous ECGs available  See ED provider note for full interpretation and clinical correlation  Confirmed by Mildred Osman ([id number]) on 9/19/2024 6:56:54 PM      Exam     GENERAL:   no distress, alert and cooperative  HEENT: Normal " Inspection, Mucous membranes moist, No JVD, No Lymphadenopathy  CARDIOVASCULAR: RRR, no murmurs, 2+ equal pulses of the extremities, normal S1 and S 2  RESPIRATORY: Patent airways, CTAB, thorax symmetric, No significant wheezing, Rales or Rhonchi  ABDOMEN: Soft, Non-Tender, Normal Bowel Sounds, No Distention  SKIN: Warm and dry, no lesions, no rashes  EXTREMITIES: normal extremities, no significant cyanosis edema, contusions or wounds, no obsvious clubbing  NEURO: A&O x 3, CN II-XII grossly intact  PSYCH: Appropriate mood and behavior    Additional Physical Exam Notes/Findings        ======= SCHEDULED MEDICATIONS =======  Scheduled medications   Medication Dose Route Frequency    amLODIPine  5 mg oral Daily    aspirin  325 mg oral BID    ceFAZolin  2 g intravenous Once    enoxaparin  60 mg subcutaneous q12h RYAN    hydroCHLOROthiazide  25 mg oral Daily before breakfast    lidocaine  1 patch transdermal Daily    lisinopril  10 mg oral Daily    polyethylene glycol  17 g oral Daily       ========== PRN MEDICATIONS =========  acetaminophen, 650 mg, q6h PRN  acetaminophen, 650 mg, q4h PRN  bisacodyl, 10 mg, Daily PRN  cyclobenzaprine, 10 mg, TID PRN  diphenhydrAMINE, 25 mg, q6h PRN  guaiFENesin, 600 mg, q12h PRN  melatonin, 3 mg, Nightly PRN  metoclopramide, 10 mg, q6h PRN   Or  metoclopramide, 10 mg, q6h PRN  morphine, 2 mg, q2h PRN  morphine, 2 mg, q4h PRN  morphine, 4 mg, q4h PRN  naloxone, 0.2 mg, q5 min PRN  naloxone, 0.2 mg, q5 min PRN  naloxone, 0.2 mg, q5 min PRN  naloxone, 0.2 mg, q5 min PRN  ondansetron, 4 mg, q6h PRN  ondansetron, 4 mg, q8h PRN   Or  ondansetron, 4 mg, q8h PRN  oxyCODONE, 10 mg, q4h PRN  oxyCODONE, 5 mg, q4h PRN  polyethylene glycol, 17 g, Daily PRN        ==============  DIET  ==============  Dietary Orders (From admission, onward)       Start     Ordered    09/15/24 1612  Adult diet Regular  Diet effective now        Question:  Diet type  Answer:  Regular    09/15/24 8490                     ====== Assessment/Plan   =======    ASSESSMENT:  Principal Problem:    Closed fracture of right ankle  Active Problems:    Closed fracture of right ankle, initial encounter    ___________________________________________________    Right ankle fracture s/p mechanical fall  Sinus tachycardia  HTN (Less controlled)  Obesity        PLAN:    No major events overnight.   Focus on pain control  BP well controlled ; added amlodipine and increased hydrochlorothiazide to 25 mg daily, lisinopril 10 mg added with better control.   HR improved.   Discussed with the patient the need for life style modification and weight loss as well as possible use of GLP1 for weight loss.    DVT Prophylaxis  Subcutaneous lovenox    SUMMARY/DISPOSITION  Hemodynamically stable after surgery. Patient pending placement.                Tom Johnson, DO

## 2024-09-22 NOTE — PROGRESS NOTES
"Stephanie Alatorre is a 53 y.o. female on day 7 of admission presenting with Closed fracture of right ankle.        Subjective   Patient seen this morning, no events overnight.  Blood pressure better controlled today, she was in no acute distress.  Denies any f/c, n/v, new onset headaches, vision changes, chest pain, dyspnea, abdominal pain, changes in BM, or urinary changes.         Objective     Last Recorded Vitals  /55 (BP Location: Right arm, Patient Position: Lying)   Pulse 85   Temp 35.4 °C (95.7 °F) (Temporal)   Resp 18   Ht 1.575 m (5' 2.01\")   Wt (!) 171 kg (376 lb 15.4 oz)   LMP  (LMP Unknown) Comment: spiratic per patient hcg negative  SpO2 93%   BMI 68.93 kg/m²     Intake/Output last 3 Shifts:  I/O last 3 completed shifts:  In: 360 (2.1 mL/kg) [P.O.:360]  Out: 4450 (26 mL/kg) [Urine:4450 (0.7 mL/kg/hr)]  Weight: 171 kg       =========RELEVANT RESULTS ==========  Labs  Lab Results   Component Value Date    WBC 7.9 09/21/2024    HGB 13.1 09/21/2024    HCT 39.0 09/21/2024    MCV 97 09/21/2024     09/21/2024     Lab Results   Component Value Date    GLUCOSE 87 09/21/2024    CALCIUM 8.4 (L) 09/21/2024     09/21/2024    K 4.0 09/21/2024    CO2 30 09/21/2024    CL 97 (L) 09/21/2024    BUN 19 09/21/2024    CREATININE 0.40 (L) 09/21/2024      Lab Results   Component Value Date     (H) 09/14/2024    AST 86 (H) 09/14/2024    ALKPHOS 104 09/14/2024    BILITOT 1.1 09/14/2024        Recent Echocardiogram (14D):   No echocardiogram results found for the past 14 days    TTE 12 month if available:  No echocardiogram results found for the past 12 months    Recent Imaging Results:  ECG 12 lead  Sinus tachycardia with Premature atrial complexes  Otherwise normal ECG  No previous ECGs available  See ED provider note for full interpretation and clinical correlation  Confirmed by Mildred Osman ([id number]) on 9/19/2024 6:56:54 PM      Exam     GENERAL:   no distress, alert and " cooperative  HEENT: Normal Inspection, Mucous membranes moist, No JVD, No Lymphadenopathy  CARDIOVASCULAR: RRR, no murmurs, 2+ equal pulses of the extremities, normal S1 and S 2  RESPIRATORY: Patent airways, CTAB, thorax symmetric, No significant wheezing, Rales or Rhonchi  ABDOMEN: Soft, Non-Tender, Normal Bowel Sounds, No Distention  SKIN: Warm and dry, no lesions, no rashes  EXTREMITIES: normal extremities, no significant cyanosis edema, contusions or wounds, no obsvious clubbing  NEURO: A&O x 3, CN II-XII grossly intact  PSYCH: Appropriate mood and behavior    Additional Physical Exam Notes/Findings        ======= SCHEDULED MEDICATIONS =======  Scheduled medications   Medication Dose Route Frequency    amLODIPine  5 mg oral Daily    aspirin  325 mg oral BID    ceFAZolin  2 g intravenous Once    enoxaparin  60 mg subcutaneous q12h RYAN    hydroCHLOROthiazide  25 mg oral Daily before breakfast    lidocaine  1 patch transdermal Daily    lisinopril  10 mg oral Daily    polyethylene glycol  17 g oral Daily       ========== PRN MEDICATIONS =========  acetaminophen, 650 mg, q6h PRN  acetaminophen, 650 mg, q4h PRN  bisacodyl, 10 mg, Daily PRN  cyclobenzaprine, 10 mg, TID PRN  diphenhydrAMINE, 25 mg, q6h PRN  guaiFENesin, 600 mg, q12h PRN  HYDROcodone-acetaminophen, 1 tablet, q4h PRN  melatonin, 3 mg, Nightly PRN  metoclopramide, 10 mg, q6h PRN   Or  metoclopramide, 10 mg, q6h PRN  morphine, 2 mg, q2h PRN  morphine, 2 mg, q4h PRN  morphine, 4 mg, q4h PRN  naloxone, 0.2 mg, q5 min PRN  naloxone, 0.2 mg, q5 min PRN  naloxone, 0.2 mg, q5 min PRN  naloxone, 0.2 mg, q5 min PRN  ondansetron, 4 mg, q6h PRN  ondansetron, 4 mg, q8h PRN   Or  ondansetron, 4 mg, q8h PRN  oxyCODONE, 10 mg, q4h PRN  polyethylene glycol, 17 g, Daily PRN        ==============  DIET  ==============  Dietary Orders (From admission, onward)       Start     Ordered    09/15/24 1612  Adult diet Regular  Diet effective now        Question:  Diet type  Answer:   Regular    09/15/24 1611                    ====== Assessment/Plan   =======    ASSESSMENT:  Principal Problem:    Closed fracture of right ankle  Active Problems:    Closed fracture of right ankle, initial encounter    ___________________________________________________    Right ankle fracture s/p mechanical fall  Sinus tachycardia  HTN (Less controlled)  Obesity        PLAN:    No major events overnight.   Focus on pain control  BP well controlled ; added amlodipine and increased hydrochlorothiazide to 25 mg daily, lisinopril added with better control.   HR improved.   Discussed with the patient the need for life style modification and weight loss as well as possible use of GLP1 for weight loss.    DVT Prophylaxis  Subcutaneous lovenox    SUMMARY/DISPOSITION  Hemodynamically stable after surgery. Patient pending placement.                Tom Johnson, DO     ABEL

## 2024-09-23 PROCEDURE — 97110 THERAPEUTIC EXERCISES: CPT | Mod: CQ,GP

## 2024-09-23 PROCEDURE — 2500000004 HC RX 250 GENERAL PHARMACY W/ HCPCS (ALT 636 FOR OP/ED): Performed by: ORTHOPAEDIC SURGERY

## 2024-09-23 PROCEDURE — 1100000001 HC PRIVATE ROOM DAILY

## 2024-09-23 PROCEDURE — 2500000001 HC RX 250 WO HCPCS SELF ADMINISTERED DRUGS (ALT 637 FOR MEDICARE OP): Performed by: INTERNAL MEDICINE

## 2024-09-23 PROCEDURE — 99232 SBSQ HOSP IP/OBS MODERATE 35: CPT | Performed by: INTERNAL MEDICINE

## 2024-09-23 PROCEDURE — 97535 SELF CARE MNGMENT TRAINING: CPT | Mod: CQ,GP

## 2024-09-23 PROCEDURE — 2500000005 HC RX 250 GENERAL PHARMACY W/O HCPCS: Performed by: ORTHOPAEDIC SURGERY

## 2024-09-23 PROCEDURE — 2500000001 HC RX 250 WO HCPCS SELF ADMINISTERED DRUGS (ALT 637 FOR MEDICARE OP): Performed by: ORTHOPAEDIC SURGERY

## 2024-09-23 RX ADMIN — ACETAMINOPHEN 650 MG: 325 TABLET ORAL at 18:57

## 2024-09-23 RX ADMIN — OXYCODONE HYDROCHLORIDE 10 MG: 5 TABLET ORAL at 13:03

## 2024-09-23 RX ADMIN — OXYCODONE HYDROCHLORIDE 10 MG: 5 TABLET ORAL at 01:32

## 2024-09-23 RX ADMIN — CYCLOBENZAPRINE 10 MG: 10 TABLET, FILM COATED ORAL at 21:42

## 2024-09-23 RX ADMIN — ENOXAPARIN SODIUM 60 MG: 60 INJECTION SUBCUTANEOUS at 08:40

## 2024-09-23 RX ADMIN — OXYCODONE HYDROCHLORIDE 10 MG: 5 TABLET ORAL at 08:41

## 2024-09-23 RX ADMIN — LIDOCAINE 1 PATCH: 4 PATCH TOPICAL at 08:41

## 2024-09-23 RX ADMIN — OXYCODONE HYDROCHLORIDE 10 MG: 5 TABLET ORAL at 21:42

## 2024-09-23 RX ADMIN — POLYETHYLENE GLYCOL 3350 17 G: 17 POWDER, FOR SOLUTION ORAL at 08:41

## 2024-09-23 RX ADMIN — ENOXAPARIN SODIUM 60 MG: 60 INJECTION SUBCUTANEOUS at 20:41

## 2024-09-23 RX ADMIN — ASPIRIN 325 MG: 325 TABLET, COATED ORAL at 23:20

## 2024-09-23 RX ADMIN — HYDROCHLOROTHIAZIDE 25 MG: 25 TABLET ORAL at 08:48

## 2024-09-23 RX ADMIN — LISINOPRIL 10 MG: 10 TABLET ORAL at 08:41

## 2024-09-23 RX ADMIN — ASPIRIN 325 MG: 325 TABLET, COATED ORAL at 08:41

## 2024-09-23 RX ADMIN — AMLODIPINE BESYLATE 5 MG: 5 TABLET ORAL at 08:41

## 2024-09-23 RX ADMIN — OXYCODONE HYDROCHLORIDE 5 MG: 5 TABLET ORAL at 17:08

## 2024-09-23 ASSESSMENT — COGNITIVE AND FUNCTIONAL STATUS - GENERAL
TURNING FROM BACK TO SIDE WHILE IN FLAT BAD: A LITTLE
CLIMB 3 TO 5 STEPS WITH RAILING: TOTAL
MOVING FROM LYING ON BACK TO SITTING ON SIDE OF FLAT BED WITH BEDRAILS: A LITTLE
STANDING UP FROM CHAIR USING ARMS: A LITTLE
MOBILITY SCORE: 15
MOVING TO AND FROM BED TO CHAIR: A LITTLE
WALKING IN HOSPITAL ROOM: A LOT

## 2024-09-23 ASSESSMENT — PAIN - FUNCTIONAL ASSESSMENT
PAIN_FUNCTIONAL_ASSESSMENT: 0-10

## 2024-09-23 ASSESSMENT — PAIN SCALES - GENERAL
PAINLEVEL_OUTOF10: 8
PAINLEVEL_OUTOF10: 4
PAINLEVEL_OUTOF10: 5 - MODERATE PAIN
PAINLEVEL_OUTOF10: 7
PAINLEVEL_OUTOF10: 0 - NO PAIN
PAINLEVEL_OUTOF10: 8
PAINLEVEL_OUTOF10: 7
PAINLEVEL_OUTOF10: 2
PAINLEVEL_OUTOF10: 3
PAINLEVEL_OUTOF10: 4

## 2024-09-23 ASSESSMENT — PAIN DESCRIPTION - LOCATION: LOCATION: ANKLE

## 2024-09-23 ASSESSMENT — PAIN SCALES - WONG BAKER: WONGBAKER_NUMERICALRESPONSE: HURTS WHOLE LOT

## 2024-09-23 NOTE — PROGRESS NOTES
"Stephanie Alatorre is a 53 y.o. female on day 9 of admission presenting with Closed fracture of right ankle.        Subjective   Patient seen this morning, no events overnight.  Blood pressure remains controlled today, she was in no acute distress.  Denies any f/c, n/v, new onset headaches, vision changes, chest pain, dyspnea, abdominal pain, changes in BM, or urinary changes.         Objective     Last Recorded Vitals  /63   Pulse 82   Temp 35.9 °C (96.6 °F)   Resp 20   Ht 1.575 m (5' 2.01\")   Wt (!) 171 kg (376 lb 15.4 oz)   LMP  (LMP Unknown) Comment: spiratic per patient hcg negative  SpO2 94%   BMI 68.93 kg/m²     Intake/Output last 3 Shifts:  I/O last 3 completed shifts:  In: - (0 mL/kg)   Out: 4400 (25.7 mL/kg) [Urine:4400 (0.7 mL/kg/hr)]  Weight: 171 kg       =========RELEVANT RESULTS ==========  Labs  Lab Results   Component Value Date    WBC 7.9 09/22/2024    HGB 12.7 09/22/2024    HCT 39.3 09/22/2024    MCV 97 09/22/2024     09/22/2024     Lab Results   Component Value Date    GLUCOSE 95 09/22/2024    CALCIUM 8.4 (L) 09/22/2024     (L) 09/22/2024    K 3.9 09/22/2024    CO2 28 09/22/2024    CL 96 (L) 09/22/2024    BUN 22 09/22/2024    CREATININE 0.51 09/22/2024      Lab Results   Component Value Date     (H) 09/14/2024    AST 86 (H) 09/14/2024    ALKPHOS 104 09/14/2024    BILITOT 1.1 09/14/2024        Recent Echocardiogram (14D):   No echocardiogram results found for the past 14 days    TTE 12 month if available:  No echocardiogram results found for the past 12 months    Recent Imaging Results:  ECG 12 lead  Sinus tachycardia with Premature atrial complexes  Otherwise normal ECG  No previous ECGs available  See ED provider note for full interpretation and clinical correlation  Confirmed by Mildred Osman ([id number]) on 9/19/2024 6:56:54 PM      Exam     GENERAL:   no distress, alert and cooperative  HEENT: Normal Inspection, Mucous membranes moist, No JVD, No " Lymphadenopathy  CARDIOVASCULAR: RRR, no murmurs, 2+ equal pulses of the extremities, normal S1 and S 2  RESPIRATORY: Patent airways, CTAB, thorax symmetric, No significant wheezing, Rales or Rhonchi  ABDOMEN: Soft, Non-Tender, Normal Bowel Sounds, No Distention  SKIN: Warm and dry, no lesions, no rashes  EXTREMITIES: normal extremities, no significant cyanosis edema, contusions or wounds, no obsvious clubbing  NEURO: A&O x 3, CN II-XII grossly intact  PSYCH: Appropriate mood and behavior    Additional Physical Exam Notes/Findings        ======= SCHEDULED MEDICATIONS =======  Scheduled medications   Medication Dose Route Frequency    amLODIPine  5 mg oral Daily    aspirin  325 mg oral BID    ceFAZolin  2 g intravenous Once    enoxaparin  60 mg subcutaneous q12h RYAN    hydroCHLOROthiazide  25 mg oral Daily before breakfast    lidocaine  1 patch transdermal Daily    lisinopril  10 mg oral Daily    polyethylene glycol  17 g oral Daily       ========== PRN MEDICATIONS =========  acetaminophen, 650 mg, q6h PRN  acetaminophen, 650 mg, q4h PRN  bisacodyl, 10 mg, Daily PRN  cyclobenzaprine, 10 mg, TID PRN  diphenhydrAMINE, 25 mg, q6h PRN  guaiFENesin, 600 mg, q12h PRN  melatonin, 3 mg, Nightly PRN  metoclopramide, 10 mg, q6h PRN   Or  metoclopramide, 10 mg, q6h PRN  morphine, 2 mg, q2h PRN  morphine, 2 mg, q4h PRN  morphine, 4 mg, q4h PRN  naloxone, 0.2 mg, q5 min PRN  naloxone, 0.2 mg, q5 min PRN  naloxone, 0.2 mg, q5 min PRN  naloxone, 0.2 mg, q5 min PRN  ondansetron, 4 mg, q6h PRN  ondansetron, 4 mg, q8h PRN   Or  ondansetron, 4 mg, q8h PRN  oxyCODONE, 10 mg, q4h PRN  oxyCODONE, 5 mg, q4h PRN  polyethylene glycol, 17 g, Daily PRN        ==============  DIET  ==============  Dietary Orders (From admission, onward)       Start     Ordered    09/15/24 1612  Adult diet Regular  Diet effective now        Question:  Diet type  Answer:  Regular    09/15/24 1611                    ====== Assessment/Plan    =======    ASSESSMENT:  Principal Problem:    Closed fracture of right ankle  Active Problems:    Closed fracture of right ankle, initial encounter    ___________________________________________________    Right ankle fracture s/p mechanical fall  Sinus tachycardia  HTN (Less controlled)  Obesity        PLAN:    No major events overnight.   Focus on pain control  BP well controlled ; added amlodipine and increased hydrochlorothiazide to 25 mg daily, lisinopril 10 mg added with better control.   HR improved.   Discussed with the patient the need for life style modification and weight loss as well as possible use of GLP1 for weight loss.    DVT Prophylaxis  Subcutaneous lovenox    SUMMARY/DISPOSITION  Hemodynamically stable after surgery. Patient pending placement.                Tom Johnson, DO

## 2024-09-23 NOTE — PROGRESS NOTES
Physical Therapy    Physical Therapy Treatment    Patient Name: Stephanie Alatorre  MRN: [id number]  Department: [facility]  Room: [address] [place] [address]  Today's Date: 9/23/2024  Time Calculation  Start Time: 1101  Stop Time: 1131  Time Calculation (min): 30 min         Assessment/Plan         PT Plan  Treatment/Interventions: Bed mobility, Transfer training, Gait training, Strengthening, Range of motion, Therapeutic exercise, Therapeutic activity  PT Plan: Ongoing PT  PT Frequency: Daily  PT Discharge Recommendations: Moderate intensity level of continued care  PT Recommended Transfer Status: Assist x2  PT - OK to Discharge: Yes (once medically cleared)      General Visit Information:   PT  Visit  PT Received On: 09/23/24       Subjective                  Objective   Pain: rle -no rating                             Treatments:  Therapeutic Exercise  Therapeutic Exercise Performed:  (supine ble glut sets, quad sets, abd/add, slrs x 20 reps ea)    Bed Mobility  Bed Mobility:  (supine to sit sba     sit to supine min a x 1)       Transfers  Transfer:  (sit to stand min/cga    patient stood with walker 2x for one minute ea  cga  nwb rle)  Pain rle with standing        Outcome Measures:  [facility] Basic Mobility  Turning from your back to your side while in a flat bed without using bedrails: A little  Moving from lying on your back to sitting on the side of a flat bed without using bedrails: A little  Moving to and from bed to chair (including a wheelchair): A little  Standing up from a chair using your arms (e.g. wheelchair or bedside chair): A little  To walk in hospital room: A lot  Climbing 3-5 steps with railing: Total  Basic Mobility - Total Score: 15    Education Documentation  Mobility Training, taught by Anna Escudero PTA at 9/23/2024 12:59 PM.  Learner: Patient  Readiness: Acceptance  Method: Demonstration  Response: Demonstrated Understanding    Precautions, taught by Anna Escudero PTA at 9/23/2024 12:59 PM.  Learner:  Patient  Readiness: Acceptance  Method: Demonstration  Response: Demonstrated Understanding    Education Comments  No comments found.           Encounter Problems       Encounter Problems (Active)       PT Problem       Pt will amb 100' using w/w with minAx1  (Progressing)       Start:  09/17/24    Expected End:  10/01/24             Pt will transfer STS with SBA  (Progressing)       Start:  09/17/24    Expected End:  10/01/24            Pt will perform a B LE ther ex program of 2-3 sets of 10  (Progressing)       Start:  09/17/24    Expected End:  10/01/24            Pt will transition supine <> sitting with SBA  (Progressing)       Start:  09/17/24    Expected End:  10/01/24               Pain - Adult

## 2024-09-23 NOTE — CARE PLAN
The patient's goals for the shift include      The clinical goals for the shift include safety and comfort

## 2024-09-23 NOTE — CARE PLAN
The clinical goals for the shift include pain control      Problem: Fall/Injury  Goal: Not fall by end of shift  Outcome: Progressing  Goal: Be free from injury by end of the shift  Outcome: Progressing  Goal: Verbalize understanding of personal risk factors for fall in the hospital  Outcome: Progressing  Goal: Verbalize understanding of risk factor reduction measures to prevent injury from fall in the home  Outcome: Progressing  Goal: Use assistive devices by end of the shift  Outcome: Progressing  Goal: Pace activities to prevent fatigue by end of the shift  Outcome: Progressing     Problem: Pain  Goal: Takes deep breaths with improved pain control throughout the shift  Outcome: Progressing  Goal: Turns in bed with improved pain control throughout the shift  Outcome: Progressing  Goal: Walks with improved pain control throughout the shift  Outcome: Progressing  Goal: Performs ADL's with improved pain control throughout shift  Outcome: Progressing  Goal: Participates in PT with improved pain control throughout the shift  Outcome: Progressing  Goal: Free from opioid side effects throughout the shift  Outcome: Progressing  Goal: Free from acute confusion related to pain meds throughout the shift  Outcome: Progressing

## 2024-09-24 PROBLEM — E87.1 HYPONATREMIA: Status: ACTIVE | Noted: 2024-09-24

## 2024-09-24 PROCEDURE — 99239 HOSP IP/OBS DSCHRG MGMT >30: CPT | Performed by: INTERNAL MEDICINE

## 2024-09-24 PROCEDURE — 2500000005 HC RX 250 GENERAL PHARMACY W/O HCPCS: Performed by: ORTHOPAEDIC SURGERY

## 2024-09-24 PROCEDURE — 2500000001 HC RX 250 WO HCPCS SELF ADMINISTERED DRUGS (ALT 637 FOR MEDICARE OP): Performed by: INTERNAL MEDICINE

## 2024-09-24 PROCEDURE — 2500000004 HC RX 250 GENERAL PHARMACY W/ HCPCS (ALT 636 FOR OP/ED): Performed by: ORTHOPAEDIC SURGERY

## 2024-09-24 PROCEDURE — 97110 THERAPEUTIC EXERCISES: CPT | Mod: CQ,GP

## 2024-09-24 PROCEDURE — 2500000001 HC RX 250 WO HCPCS SELF ADMINISTERED DRUGS (ALT 637 FOR MEDICARE OP): Performed by: ORTHOPAEDIC SURGERY

## 2024-09-24 PROCEDURE — 1100000001 HC PRIVATE ROOM DAILY

## 2024-09-24 RX ORDER — ASPIRIN 81 MG/1
81 TABLET ORAL DAILY
COMMUNITY

## 2024-09-24 RX ORDER — GABAPENTIN 100 MG/1
100 CAPSULE ORAL 3 TIMES DAILY
COMMUNITY

## 2024-09-24 RX ORDER — SPIRONOLACTONE 25 MG/1
25 TABLET ORAL DAILY
COMMUNITY
End: 2024-09-26 | Stop reason: HOSPADM

## 2024-09-24 RX ORDER — PANTOPRAZOLE SODIUM 20 MG/1
20 TABLET, DELAYED RELEASE ORAL
COMMUNITY

## 2024-09-24 RX ORDER — LOSARTAN POTASSIUM 50 MG/1
100 TABLET ORAL DAILY
COMMUNITY
End: 2024-09-26 | Stop reason: HOSPADM

## 2024-09-24 RX ORDER — FERROUS SULFATE, DRIED 160(50) MG
1 TABLET, EXTENDED RELEASE ORAL DAILY
COMMUNITY

## 2024-09-24 RX ORDER — LANOLIN ALCOHOL/MO/W.PET/CERES
50 CREAM (GRAM) TOPICAL DAILY
COMMUNITY

## 2024-09-24 RX ORDER — ACETAMINOPHEN 325 MG/1
650 TABLET ORAL EVERY 6 HOURS PRN
Start: 2024-09-24

## 2024-09-24 RX ORDER — ACETAMINOPHEN 500 MG
5000 TABLET ORAL DAILY
COMMUNITY

## 2024-09-24 RX ORDER — ASPIRIN 325 MG
325 TABLET, DELAYED RELEASE (ENTERIC COATED) ORAL 2 TIMES DAILY
Qty: 11 TABLET | Refills: 0 | Status: SHIPPED | OUTPATIENT
Start: 2024-09-24 | End: 2024-09-30

## 2024-09-24 RX ORDER — DEXTROMETHORPHAN HYDROBROMIDE, GUAIFENESIN 5; 100 MG/5ML; MG/5ML
500 LIQUID ORAL EVERY 8 HOURS PRN
COMMUNITY

## 2024-09-24 RX ORDER — SULFAMETHOXAZOLE AND TRIMETHOPRIM 800; 160 MG/1; MG/1
1 TABLET ORAL 2 TIMES DAILY
COMMUNITY
End: 2024-09-26 | Stop reason: HOSPADM

## 2024-09-24 RX ORDER — LISINOPRIL 10 MG/1
10 TABLET ORAL DAILY
Qty: 30 TABLET | Refills: 1 | Status: SHIPPED | OUTPATIENT
Start: 2024-09-25 | End: 2024-09-25 | Stop reason: HOSPADM

## 2024-09-24 RX ORDER — HYDROCHLOROTHIAZIDE 25 MG/1
25 TABLET ORAL DAILY
COMMUNITY
End: 2024-09-26 | Stop reason: HOSPADM

## 2024-09-24 RX ORDER — POLYETHYLENE GLYCOL 3350 17 G/17G
17 POWDER, FOR SOLUTION ORAL DAILY PRN
Start: 2024-09-24

## 2024-09-24 RX ORDER — OXYCODONE HYDROCHLORIDE 5 MG/1
5 TABLET ORAL EVERY 4 HOURS PRN
Start: 2024-09-24 | End: 2024-09-26 | Stop reason: SDUPTHER

## 2024-09-24 RX ORDER — ATORVASTATIN CALCIUM 40 MG/1
40 TABLET, FILM COATED ORAL DAILY
COMMUNITY

## 2024-09-24 RX ADMIN — ASPIRIN 325 MG: 325 TABLET, COATED ORAL at 20:15

## 2024-09-24 RX ADMIN — HYDROCHLOROTHIAZIDE 25 MG: 25 TABLET ORAL at 06:48

## 2024-09-24 RX ADMIN — ASPIRIN 325 MG: 325 TABLET, COATED ORAL at 08:22

## 2024-09-24 RX ADMIN — LISINOPRIL 10 MG: 10 TABLET ORAL at 08:22

## 2024-09-24 RX ADMIN — OXYCODONE HYDROCHLORIDE 10 MG: 5 TABLET ORAL at 02:01

## 2024-09-24 RX ADMIN — ENOXAPARIN SODIUM 60 MG: 60 INJECTION SUBCUTANEOUS at 09:23

## 2024-09-24 RX ADMIN — AMLODIPINE BESYLATE 5 MG: 5 TABLET ORAL at 08:22

## 2024-09-24 RX ADMIN — ENOXAPARIN SODIUM 60 MG: 60 INJECTION SUBCUTANEOUS at 20:15

## 2024-09-24 RX ADMIN — OXYCODONE HYDROCHLORIDE 10 MG: 5 TABLET ORAL at 13:36

## 2024-09-24 RX ADMIN — CYCLOBENZAPRINE 10 MG: 10 TABLET, FILM COATED ORAL at 20:15

## 2024-09-24 RX ADMIN — OXYCODONE HYDROCHLORIDE 10 MG: 5 TABLET ORAL at 18:25

## 2024-09-24 RX ADMIN — LIDOCAINE 1 PATCH: 4 PATCH TOPICAL at 08:21

## 2024-09-24 RX ADMIN — POLYETHYLENE GLYCOL 3350 17 G: 17 POWDER, FOR SOLUTION ORAL at 08:22

## 2024-09-24 RX ADMIN — OXYCODONE HYDROCHLORIDE 10 MG: 5 TABLET ORAL at 08:22

## 2024-09-24 RX ADMIN — OXYCODONE HYDROCHLORIDE 10 MG: 5 TABLET ORAL at 23:11

## 2024-09-24 ASSESSMENT — PAIN SCALES - GENERAL
PAINLEVEL_OUTOF10: 7
PAINLEVEL_OUTOF10: 3
PAINLEVEL_OUTOF10: 3
PAINLEVEL_OUTOF10: 8
PAINLEVEL_OUTOF10: 6
PAINLEVEL_OUTOF10: 7
PAINLEVEL_OUTOF10: 7
PAINLEVEL_OUTOF10: 0 - NO PAIN

## 2024-09-24 ASSESSMENT — COGNITIVE AND FUNCTIONAL STATUS - GENERAL
WALKING IN HOSPITAL ROOM: A LOT
MOVING TO AND FROM BED TO CHAIR: A LITTLE
STANDING UP FROM CHAIR USING ARMS: A LITTLE
MOBILITY SCORE: 15
TURNING FROM BACK TO SIDE WHILE IN FLAT BAD: A LITTLE
MOVING FROM LYING ON BACK TO SITTING ON SIDE OF FLAT BED WITH BEDRAILS: A LITTLE
CLIMB 3 TO 5 STEPS WITH RAILING: TOTAL

## 2024-09-24 ASSESSMENT — PAIN - FUNCTIONAL ASSESSMENT
PAIN_FUNCTIONAL_ASSESSMENT: 0-10

## 2024-09-24 ASSESSMENT — PAIN DESCRIPTION - DESCRIPTORS
DESCRIPTORS: ACHING
DESCRIPTORS: ACHING

## 2024-09-24 NOTE — DISCHARGE SUMMARY
Discharge Diagnosis  Closed fracture of right ankle  Hypertension  Hyponatremia    Issues Requiring Follow-Up  Closed fracture of right ankle  Hypertension  Hyponatremia    Discharge Meds     Medication List      START taking these medications     acetaminophen 325 mg tablet; Commonly known as: Tylenol; Take 2 tablets   (650 mg) by mouth every 6 hours if needed for mild pain (1 - 3).   aspirin 325 mg EC tablet; Take 1 tablet (325 mg) by mouth 2 times a day   for 11 doses.   lisinopril 10 mg tablet; Take 1 tablet (10 mg) by mouth once daily.;   Start taking on: September 25, 2024   oxyCODONE 5 mg immediate release tablet; Commonly known as: Roxicodone;   Take 1 tablet (5 mg) by mouth every 4 hours if needed for moderate pain (4   - 6).   polyethylene glycol 17 gram packet; Commonly known as: Glycolax,   Miralax; Take 17 g by mouth once daily as needed (Constipation).     CONTINUE taking these medications     b complex vitamins capsule   cholecalciferol (vitamin D3) 250 mcg (10,000 unit) capsule   cyanocobalamin 1,000 mcg tablet; Commonly known as: Vitamin B-12   Daily Multi-Vitamin tablet; Generic drug: multivitamin   turmeric root extract 500 mg capsule   zinc sulfate 220 (50 Zn) MG capsule; Commonly known as: Zincate   ZyrTEC 10 mg tablet; Generic drug: cetirizine     STOP taking these medications     hydroCHLOROthiazide 12.5 mg tablet; Commonly known as: Microzide       Test Results Pending At Discharge  Pending Labs       No current pending labs.            Hospital Course  54 y/o female PMHx HTN, mildly elevated liver enzymes, obesity presented to [facility] after mechanical fall at home.  In the ED patient found to have a right ankle fracture.  Orthopedic surgery consulted and patient went to have open reduction internal fixation of right ankle.  Her blood pressures were not at goal and antihypertensives were adjusted.  Her sodium as well as potassium were noted to be low.  Potassium was replaced and review of her  home medications show that she takes hydrochlorothiazide.  Likely this is the cause of her electrolyte abnormalities, and I recommend that she stop taking the hydrochlorothiazide and this was changed to lisinopril 10 mg.  Commend patient follow-up outpatient with her PCP for additional adjustments to her blood pressure medications and follow-up labs.  Patient is stable to be discharged to skilled nursing facility for PT and OT.  Nonweightbearing right foot, follow-up with orthopedic surgery outpatient.    Pertinent Physical Exam At Time of Discharge  Physical Exam    GEN:  awake, alert, not in acute distress, obese   HEENT:  normocephalic, atraumatic, PERRL, EOM intact, nares patent  Cardio:  RRR, no murmurs  Resp:  CTAB, no wheezing  Abd:  +BS, soft, nontender  :  deferred  Neuro:  A&Ox3, CN2-12 grossly intact, no focal deficits  Msk:  Right lower extremity in dressing  Skin:  warm, dry, intact  Psych:  appropriate in mood and behavior      Outpatient Follow-Up  Follow up with Dr. Wyman orthopedic surgery.  Follow up with PCP in 2-3 weeks.      Alexis Machado DO  Senior Attending Physician  [facility]  Clinical Instructor

## 2024-09-24 NOTE — PROGRESS NOTES
9/24/2024   Asked Clarisa SCHERER from [facility] to message facility and check on auth.   Per [facility]- facility has auth.  Secure chat to MD.   Yanely Kraus RN [facility]    9/24/2024  Md wanted to adjust medications.  Provider certification not yet done.    Yanely Kraus RN [facility]

## 2024-09-24 NOTE — CONSULTS
"Nutrition Initial Assessment:   Nutrition Assessment    Reason for Assessment: Length of stay    Patient is a 53 y.o. female presenting with closed fracture of right ankle. Noted discharge orders.       Nutrition History:  Energy Intake: Fair 50-75 %  Food and Nutrient History: Pt reports she was not eating well at the beginning of her admission but appetite has improved. Was eating well PTA. Had some constipation from pain meds but this has also improved. No N/V. No issues chewing/swallowing.  Vitamin/Herbal Supplement Use: B complex, vitamin D3, vitamin B12, multivitamin, turmeric per home med list  Food Allergies/Intolerances:  None  GI Symptoms: Constipation - improved  Oral Problems: None       Anthropometrics:  Height: 157.5 cm (5' 2.01\")   Weight: (!) 171 kg (376 lb 15.4 oz)   BMI (Calculated): 68.93  IBW/kg (Dietitian Calculated): 50 kg          Weight History:   Wt Readings from Last 10 Encounters:   09/15/24 (!) 171 kg (376 lb 15.4 oz)   05/17/24 (!) 171 kg (378 lb)   02/26/24 (!) 171 kg (376 lb)   02/26/24 (!) 171 kg (376 lb 9.6 oz)   06/20/23 (!) 169 kg (373 lb)   08/08/22 (!) 169 kg (372 lb)      Weight Change %:  Weight History / % Weight Change: Pt reports UBW is 375#. No evidence of wt loss based on available wt records. Pt says she likely lost weight during admission due to not eating for a few days. No weight taken since admission to assess for changes.  Significant Weight Loss: No    Nutrition Focused Physical Exam Findings:  defer: not indicated  Subcutaneous Fat Loss:   Orbital Fat Pads: Defer  Muscle Wasting:  Temporalis: Defer  Edema:  Edema Location: non-pitting BLE edema per nursing assessment  Physical Findings:  Skin: Positive (right ankle incision per nursing assessment)    Nutrition Significant Labs:    Reviewed   Nutrition Specific Medications:  Reviewed     I/O:   Last BM Date: 09/13/24; Stool Appearance: Soft (09/23/24 0900)    Dietary Orders (From admission, onward)       Start     " Ordered    09/15/24 1612  Adult diet Regular  Diet effective now        Question:  Diet type  Answer:  Regular    09/15/24 1611                     Estimated Needs:   Total Energy Estimated Needs (kCal): 1400 kCal  Method for Estimating Needs: 28 kcal/kg IBW  Total Protein Estimated Needs (g): 50 g  Method for Estimating Needs: 1 g/kg IBW  Total Fluid Estimated Needs (mL): 1400 mL  Method for Estimating Needs: 1 ml/kcal or per MD        Nutrition Diagnosis   Malnutrition Diagnosis  Patient has Malnutrition Diagnosis: No    Nutrition Diagnosis  Patient has Nutrition Diagnosis: No       Nutrition Interventions/Recommendations         Nutrition Prescription:  Individualized Nutrition Prescription Provided for : Regular diet        Nutrition Interventions:   Interventions: Meals and snacks  Meals and Snacks: General healthful diet  Goal: Consumes 3 meals per day  Additional Interventions: If pt remains admitted and intake is suboptimal, will consider ONS.         Nutrition Education:   Discussed wt loss in the setting of acute illness/injury and how it impacts muscle mass. Discussed monitoring wt after discharge and supplementing with a protein shake if wt loss has been significant.        Nutrition Monitoring and Evaluation   Food/Nutrient Related History Monitoring  Monitoring and Evaluation Plan: Energy intake, Amount of food, Fluid intake  Energy Intake: Estimated energy intake  Criteria: Pt meets >75% of estimated energy needs  Fluid Intake: Estimated fluid intake  Criteria: Meets >75% of estimated fluid needs  Amount of Food: Estimated amout of food  Criteria: Pt consumes >75% of meals    Body Composition/Growth/Weight History  Monitoring and Evaluation Plan: Weight  Weight: Measured weight  Criteria: Maintains stable weight                   Time Spent (min): 30 minutes

## 2024-09-24 NOTE — HOSPITAL COURSE
Stephanie Alatorre is a 53 y.o. female presenting with pmhx HTN, mildly elevated liver enzymes, obesity with BMI 68 presenting after a trip and fall at home.  She had a surgery of her right ankle.  Blood pressure was not well-controlled.  She was on hydrochlorothiazide 12.5 mg daily, which was increased to 25 mg daily, plus amlodipine and lisinopril 10 mg with good control.  Patient is now pending placement.

## 2024-09-24 NOTE — PROGRESS NOTES
Social work consult placed for positive medical risk screen/dc planning. SW reviewed pt's chart and communicated with TCC. No SW needs foreseen at this time. SW signing off; available upon request.

## 2024-09-24 NOTE — PROGRESS NOTES
Physical Therapy    Physical Therapy Treatment    Patient Name: Stephanie Alatorre  MRN: [id number]  Department: [facility]  Room: [address] [place] [address]  Today's Date: 9/24/2024  Time Calculation  Start Time: 1431  Stop Time: 1131  Time Calculation (min): 30 min         Assessment/Plan         PT Plan  Treatment/Interventions: Bed mobility, Transfer training, Gait training, Strengthening, Range of motion, Therapeutic exercise, Therapeutic activity  PT Plan: Ongoing PT  PT Frequency: Daily  PT Discharge Recommendations: Moderate intensity level of continued care  PT Recommended Transfer Status: Assist x2  PT - OK to Discharge: Yes (once medically cleared)      General Visit Information:   PT  Visit  PT Received On: 09/24/24       Subjective                  Objective   Pain:no complaints                       Treatments:  Therapeutic Exercise  Therapeutic Exercise Performed:  (supine ble qs,gs,hs,slrs,abd, shd blade squeezes, iso abdominal 2 x 20 reps)                      Outcome Measures:  [facility] Basic Mobility  Turning from your back to your side while in a flat bed without using bedrails: A little  Moving from lying on your back to sitting on the side of a flat bed without using bedrails: A little  Moving to and from bed to chair (including a wheelchair): A little  Standing up from a chair using your arms (e.g. wheelchair or bedside chair): A little  To walk in hospital room: A lot  Climbing 3-5 steps with railing: Total  Basic Mobility - Total Score: 15    Education Documentation  Mobility Training, taught by Anna Escudero PTA at 9/24/2024  3:10 PM.  Learner: Patient  Readiness: Acceptance  Method: Demonstration  Response: Demonstrated Understanding    Precautions, taught by Anna Escudero PTA at 9/24/2024  3:10 PM.  Learner: Patient  Readiness: Acceptance  Method: Demonstration  Response: Demonstrated Understanding    Education Comments  No comments found.               Encounter Problems       Encounter Problems (Active)        PT Problem       Pt will amb 100' using w/w with minAx1  (Progressing)       Start:  09/17/24    Expected End:  10/01/24             Pt will transfer STS with SBA  (Progressing)       Start:  09/17/24    Expected End:  10/01/24            Pt will perform a B LE ther ex program of 2-3 sets of 10  (Progressing)       Start:  09/17/24    Expected End:  10/01/24            Pt will transition supine <> sitting with SBA  (Progressing)       Start:  09/17/24    Expected End:  10/01/24               Pain - Adult

## 2024-09-24 NOTE — CARE PLAN
The patient's goals for the shift include  pain control    The clinical goals for the shift include safety and comfort      Problem: Fall/Injury  Goal: Not fall by end of shift  Outcome: Progressing  Goal: Be free from injury by end of the shift  Outcome: Progressing  Goal: Verbalize understanding of personal risk factors for fall in the hospital  Outcome: Progressing  Goal: Verbalize understanding of risk factor reduction measures to prevent injury from fall in the home  Outcome: Progressing  Goal: Use assistive devices by end of the shift  Outcome: Progressing  Goal: Pace activities to prevent fatigue by end of the shift  Outcome: Progressing     Problem: Pain  Goal: Takes deep breaths with improved pain control throughout the shift  Outcome: Progressing  Goal: Turns in bed with improved pain control throughout the shift  Outcome: Progressing  Goal: Walks with improved pain control throughout the shift  Outcome: Progressing  Goal: Performs ADL's with improved pain control throughout shift  Outcome: Progressing  Goal: Participates in PT with improved pain control throughout the shift  Outcome: Progressing  Goal: Free from opioid side effects throughout the shift  Outcome: Progressing  Goal: Free from acute confusion related to pain meds throughout the shift  Outcome: Progressing     Problem: Skin  Goal: Decreased wound size/increased tissue granulation at next dressing change  Outcome: Progressing  Goal: Participates in plan/prevention/treatment measures  Outcome: Progressing  Goal: Prevent/manage excess moisture  Outcome: Progressing  Goal: Prevent/minimize sheer/friction injuries  Outcome: Progressing  Goal: Promote/optimize nutrition  Outcome: Progressing  Goal: Promote skin healing  Outcome: Progressing     Problem: Pain - Adult  Goal: Verbalizes/displays adequate comfort level or baseline comfort level  Outcome: Progressing     Problem: Safety - Adult  Goal: Free from fall injury  Outcome: Progressing      Problem: Discharge Planning  Goal: Discharge to home or other facility with appropriate resources  Outcome: Progressing     Problem: Chronic Conditions and Co-morbidities  Goal: Patient's chronic conditions and co-morbidity symptoms are monitored and maintained or improved  Outcome: Progressing

## 2024-09-24 NOTE — PROGRESS NOTES
Minimal pain, neurovascular okay.  Discussed range of motion leg liftsa, and general discussion of motion for this leg as well as maintaining nonweightbearing right foot.  Still awaiting definitive discharge plans.  Follow-up with Dr. Landry arranged

## 2024-09-25 VITALS
DIASTOLIC BLOOD PRESSURE: 63 MMHG | RESPIRATION RATE: 18 BRPM | HEART RATE: 93 BPM | BODY MASS INDEX: 53.92 KG/M2 | OXYGEN SATURATION: 95 % | HEIGHT: 62 IN | SYSTOLIC BLOOD PRESSURE: 119 MMHG | TEMPERATURE: 97.2 F | WEIGHT: 293 LBS

## 2024-09-25 PROCEDURE — 1100000001 HC PRIVATE ROOM DAILY

## 2024-09-25 PROCEDURE — 2500000004 HC RX 250 GENERAL PHARMACY W/ HCPCS (ALT 636 FOR OP/ED): Performed by: ORTHOPAEDIC SURGERY

## 2024-09-25 PROCEDURE — 99232 SBSQ HOSP IP/OBS MODERATE 35: CPT | Performed by: INTERNAL MEDICINE

## 2024-09-25 PROCEDURE — 97110 THERAPEUTIC EXERCISES: CPT | Mod: CQ,GP

## 2024-09-25 PROCEDURE — 2500000001 HC RX 250 WO HCPCS SELF ADMINISTERED DRUGS (ALT 637 FOR MEDICARE OP): Performed by: ORTHOPAEDIC SURGERY

## 2024-09-25 PROCEDURE — 2500000001 HC RX 250 WO HCPCS SELF ADMINISTERED DRUGS (ALT 637 FOR MEDICARE OP): Performed by: INTERNAL MEDICINE

## 2024-09-25 PROCEDURE — 2500000005 HC RX 250 GENERAL PHARMACY W/O HCPCS: Performed by: ORTHOPAEDIC SURGERY

## 2024-09-25 RX ORDER — LISINOPRIL 10 MG/1
10 TABLET ORAL DAILY
Qty: 30 TABLET | Refills: 0 | Status: SHIPPED | OUTPATIENT
Start: 2024-09-26 | End: 2024-10-26

## 2024-09-25 RX ADMIN — OXYCODONE HYDROCHLORIDE 10 MG: 5 TABLET ORAL at 13:02

## 2024-09-25 RX ADMIN — OXYCODONE HYDROCHLORIDE 5 MG: 5 TABLET ORAL at 08:13

## 2024-09-25 RX ADMIN — ENOXAPARIN SODIUM 60 MG: 60 INJECTION SUBCUTANEOUS at 08:13

## 2024-09-25 RX ADMIN — ASPIRIN 325 MG: 325 TABLET, COATED ORAL at 20:22

## 2024-09-25 RX ADMIN — OXYCODONE HYDROCHLORIDE 10 MG: 5 TABLET ORAL at 21:57

## 2024-09-25 RX ADMIN — OXYCODONE HYDROCHLORIDE 10 MG: 5 TABLET ORAL at 17:39

## 2024-09-25 RX ADMIN — LIDOCAINE 1 PATCH: 4 PATCH TOPICAL at 08:12

## 2024-09-25 RX ADMIN — LISINOPRIL 10 MG: 10 TABLET ORAL at 08:13

## 2024-09-25 RX ADMIN — CYCLOBENZAPRINE 10 MG: 10 TABLET, FILM COATED ORAL at 20:22

## 2024-09-25 RX ADMIN — POLYETHYLENE GLYCOL 3350 17 G: 17 POWDER, FOR SOLUTION ORAL at 08:13

## 2024-09-25 RX ADMIN — ASPIRIN 325 MG: 325 TABLET, COATED ORAL at 08:13

## 2024-09-25 RX ADMIN — ENOXAPARIN SODIUM 60 MG: 60 INJECTION SUBCUTANEOUS at 20:23

## 2024-09-25 ASSESSMENT — COGNITIVE AND FUNCTIONAL STATUS - GENERAL
WALKING IN HOSPITAL ROOM: A LOT
MOBILITY SCORE: 15
MOVING FROM LYING ON BACK TO SITTING ON SIDE OF FLAT BED WITH BEDRAILS: A LITTLE
MOVING TO AND FROM BED TO CHAIR: A LITTLE
TURNING FROM BACK TO SIDE WHILE IN FLAT BAD: A LITTLE
STANDING UP FROM CHAIR USING ARMS: A LITTLE
CLIMB 3 TO 5 STEPS WITH RAILING: TOTAL

## 2024-09-25 ASSESSMENT — PAIN DESCRIPTION - DESCRIPTORS
DESCRIPTORS: ACHING

## 2024-09-25 ASSESSMENT — PAIN - FUNCTIONAL ASSESSMENT
PAIN_FUNCTIONAL_ASSESSMENT: 0-10

## 2024-09-25 ASSESSMENT — PAIN SCALES - GENERAL
PAINLEVEL_OUTOF10: 0 - NO PAIN
PAINLEVEL_OUTOF10: 7
PAINLEVEL_OUTOF10: 8
PAINLEVEL_OUTOF10: 5 - MODERATE PAIN
PAINLEVEL_OUTOF10: 6
PAINLEVEL_OUTOF10: 7
PAINLEVEL_OUTOF10: 5 - MODERATE PAIN

## 2024-09-25 NOTE — PROGRESS NOTES
Patient has written discharge order. Precert obtained for [facility]. AVS and [employer] completed. Transport requested and scheduled for 530. Nurse and patient aware.

## 2024-09-25 NOTE — PROGRESS NOTES
"Stephanie Alatorre is a 53 y.o. female on day 11 of admission presenting with Closed fracture of right ankle.    Subjective   Pt doing well, pain is controlled.  I reviewed some listed home medications with patient.  She states she was only on hydrochlorothiazide for blood pressure control outpatient.  Denies taking Losartan, Aldactone.  Denies taking Bactrim.  I will remove these from her home meds.       Objective     Physical Exam  GEN:  awake, alert, not in acute distress, obese   HEENT:  normocephalic, atraumatic, PERRL, EOM intact, nares patent  Cardio:  RRR, no murmurs  Resp:  CTAB, no wheezing  Abd:  +BS, soft, nontender  :  deferred  Neuro:  A&Ox3, CN2-12 grossly intact, no focal deficits  Msk:  Right lower extremity in dressing  Skin:  warm, dry, intact  Psych:  appropriate in mood and behavior    Last Recorded Vitals  Blood pressure 137/70, pulse 75, temperature 35.7 °C (96.3 °F), temperature source Temporal, resp. rate 18, height 1.575 m (5' 2.01\"), weight (!) 171 kg (376 lb 15.4 oz), SpO2 95%.  Intake/Output last 3 Shifts:  I/O last 3 completed shifts:  In: - (0 mL/kg)   Out: 4050 (23.7 mL/kg) [Urine:4050 (0.7 mL/kg/hr)]  Weight: 171 kg     Relevant Results               Scheduled medications  aspirin, 325 mg, oral, BID  ceFAZolin, 2 g, intravenous, Once  enoxaparin, 60 mg, subcutaneous, q12h RYAN  lidocaine, 1 patch, transdermal, Daily  lisinopril, 10 mg, oral, Daily  polyethylene glycol, 17 g, oral, Daily      Continuous medications  oxygen, 2 L/min      PRN medications  PRN medications: acetaminophen, acetaminophen, bisacodyl, cyclobenzaprine, diphenhydrAMINE, guaiFENesin, melatonin, metoclopramide **OR** metoclopramide, morphine, morphine, morphine, naloxone, naloxone, naloxone, naloxone, ondansetron, ondansetron **OR** ondansetron, oxyCODONE, oxyCODONE, polyethylene glycol          Assessment/Plan   Assessment & Plan  Closed fracture of right ankle    Closed fracture of right ankle, initial " encounter    Hyponatremia    # Right ankle fracture s/p ORIF  # Hypertension  # Hyponatremia  # Hypokalemia  # Obesity    Patient medically stable to be discharged to SNF.   NWB to RLE, follow up with orthopedic surgery next week.  I again discussed with patient to stop taking hydrochlorothiazide as this is causing hyponatremia and hypokalemia.  She was switched to Lisinopril 10mg daily and will need continued monitoring of her blood pressures outpatient.  DVT prophylaxis with aspirin 324mg BID.         I spent 30 minutes in the professional and overall care of this patient.      Alexis Machado, DO

## 2024-09-25 NOTE — CARE PLAN
The patient's goals for the shift include      The clinical goals for the shift include   Problem: Fall/Injury  Goal: Not fall by end of shift  Outcome: Progressing  Goal: Be free from injury by end of the shift  Outcome: Progressing  Goal: Verbalize understanding of personal risk factors for fall in the hospital  Outcome: Progressing  Goal: Verbalize understanding of risk factor reduction measures to prevent injury from fall in the home  Outcome: Progressing  Goal: Use assistive devices by end of the shift  Outcome: Progressing  Goal: Pace activities to prevent fatigue by end of the shift  Outcome: Progressing     Problem: Pain  Goal: Takes deep breaths with improved pain control throughout the shift  Outcome: Progressing  Goal: Turns in bed with improved pain control throughout the shift  Outcome: Progressing  Goal: Walks with improved pain control throughout the shift  Outcome: Progressing  Goal: Performs ADL's with improved pain control throughout shift  Outcome: Progressing  Goal: Participates in PT with improved pain control throughout the shift  Outcome: Progressing  Goal: Free from opioid side effects throughout the shift  Outcome: Progressing  Goal: Free from acute confusion related to pain meds throughout the shift  Outcome: Progressing     Problem: Skin  Goal: Decreased wound size/increased tissue granulation at next dressing change  Outcome: Progressing  Goal: Participates in plan/prevention/treatment measures  Outcome: Progressing  Goal: Prevent/manage excess moisture  Outcome: Progressing  Goal: Prevent/minimize sheer/friction injuries  Outcome: Progressing  Goal: Promote/optimize nutrition  Outcome: Progressing  Goal: Promote skin healing  Outcome: Progressing     Problem: Pain - Adult  Goal: Verbalizes/displays adequate comfort level or baseline comfort level  Outcome: Progressing     Problem: Safety - Adult  Goal: Free from fall injury  Outcome: Progressing     Problem: Discharge Planning  Goal:  Discharge to home or other facility with appropriate resources  Outcome: Progressing     Problem: Chronic Conditions and Co-morbidities  Goal: Patient's chronic conditions and co-morbidity symptoms are monitored and maintained or improved  Outcome: Progressing

## 2024-09-25 NOTE — PROGRESS NOTES
Physical Therapy    Physical Therapy Treatment    Patient Name: Stephanie Alatorre  MRN: [id number]  Department: [facility]  Room: [address] [place] [address]  Today's Date: 9/25/2024  Time Calculation  Start Time: 1331  Stop Time: 1401  Time Calculation (min): 30 min         Assessment/Plan         PT Plan  Treatment/Interventions: Bed mobility, Transfer training, Gait training, Strengthening, Range of motion, Therapeutic exercise, Therapeutic activity  PT Plan: Ongoing PT  PT Frequency: Daily  PT Discharge Recommendations: Moderate intensity level of continued care  PT Recommended Transfer Status: Assist x2  PT - OK to Discharge: Yes (once medically cleared)      General Visit Information:   PT  Visit  PT Received On: 09/25/24       Subjective                  Objective   Pain:no complaints                          Treatments:  Therapeutic Exercise  Therapeutic Exercise Performed:  (supine ble ap's, qs,gs,slrs,abd, heelslides, isometrc abdominal , shd blade squeezes 2 x 10 reps ea)                      Outcome Measures:  [facility] Basic Mobility  Turning from your back to your side while in a flat bed without using bedrails: A little  Moving from lying on your back to sitting on the side of a flat bed without using bedrails: A little  Moving to and from bed to chair (including a wheelchair): A little  Standing up from a chair using your arms (e.g. wheelchair or bedside chair): A little  To walk in hospital room: A lot  Climbing 3-5 steps with railing: Total  Basic Mobility - Total Score: 15    Education Documentation  Mobility Training, taught by Anna Escudero PTA at 9/25/2024  3:34 PM.  Learner: Patient  Readiness: Acceptance  Method: Demonstration  Response: Demonstrated Understanding    Precautions, taught by Anna Escudero PTA at 9/25/2024  3:34 PM.  Learner: Patient  Readiness: Acceptance  Method: Demonstration  Response: Demonstrated Understanding    Education Comments  No comments found.               Encounter Problems        Encounter Problems (Active)       PT Problem       Pt will amb 100' using w/w with minAx1  (Progressing)       Start:  09/17/24    Expected End:  10/01/24             Pt will transfer STS with SBA  (Progressing)       Start:  09/17/24    Expected End:  10/01/24            Pt will perform a B LE ther ex program of 2-3 sets of 10  (Progressing)       Start:  09/17/24    Expected End:  10/01/24            Pt will transition supine <> sitting with SBA  (Progressing)       Start:  09/17/24    Expected End:  10/01/24               Pain - Adult

## 2024-09-26 DIAGNOSIS — S82.891A CLOSED FRACTURE OF RIGHT ANKLE, INITIAL ENCOUNTER: ICD-10-CM

## 2024-09-27 ENCOUNTER — NURSING HOME VISIT (OUTPATIENT)
Dept: POST ACUTE CARE | Facility: EXTERNAL LOCATION | Age: 54
End: 2024-09-27
Payer: COMMERCIAL

## 2024-09-27 DIAGNOSIS — R53.83 OTHER FATIGUE: ICD-10-CM

## 2024-09-27 DIAGNOSIS — I10 ESSENTIAL HYPERTENSION, BENIGN: Primary | ICD-10-CM

## 2024-09-27 DIAGNOSIS — E87.1 HYPONATREMIA: ICD-10-CM

## 2024-09-27 DIAGNOSIS — S82.891A CLOSED FRACTURE OF RIGHT ANKLE, INITIAL ENCOUNTER: ICD-10-CM

## 2024-09-27 PROCEDURE — 99305 1ST NF CARE MODERATE MDM 35: CPT | Performed by: INTERNAL MEDICINE

## 2024-09-27 RX ORDER — OXYCODONE HYDROCHLORIDE 5 MG/1
5 TABLET ORAL EVERY 4 HOURS PRN
Qty: 120 TABLET | Refills: 0 | Status: SHIPPED | OUTPATIENT
Start: 2024-09-27

## 2024-09-27 NOTE — LETTER
Patient: Stephanie Alatorre  : 1970    Encounter Date: 2024    HISTORY & PHYSICAL    Subjective  Chief complaint: Stephanie Alatorre is a 54 y.o. female who is a acute skilled care patient being seen and evaluated for multiple medical problems.  Patient presents for skilled therapy.    HPI:  52 y/o female PMHx HTN, mildly elevated liver enzymes, obesity presented to [facility] after mechanical fall at home.  In the ED patient found to have a right ankle fracture.  Orthopedic surgery consulted and patient went to have open reduction internal fixation of right ankle.  Her blood pressures were not at goal and antihypertensives were adjusted.  Her sodium as well as potassium were noted to be low.  Potassium was replaced and review of her home medications show that she takes hydrochlorothiazide.  Likely this is the cause of her electrolyte abnormalities, and I recommend that she stop taking the hydrochlorothiazide and this was changed to lisinopril 10 mg.  Commend patient follow-up outpatient with her PCP for additional adjustments to her blood pressure medications and follow-up labs.  Patient is stable to be discharged to skilled nursing facility for PT and OT.  Nonweightbearing right foot, follow-up with orthopedic surgery outpatient.        Past Medical History:   Diagnosis Date   • Personal history of other (healed) physical injury and trauma 10/09/2015    History of sprain of ankle       Past Surgical History:   Procedure Laterality Date   • OTHER SURGICAL HISTORY  2022    Cholecystectomy   • OTHER SURGICAL HISTORY  2022    Kamila-en-Y gastric bypass       Family History   Problem Relation Name Age of Onset   • Other (arteriosclerotic cardiovascular) Mother     • Other (Other) Sister         Social History     Socioeconomic History   • Marital status: Single   Tobacco Use   • Smoking status: Never     Passive exposure: Never   • Smokeless tobacco: Never   Substance and Sexual Activity   • Alcohol use: Not  Currently   • Drug use: Not Currently   • Sexual activity: Not Currently     Social Determinants of Health     Financial Resource Strain: Low Risk  (9/14/2024)    Overall Financial Resource Strain (CARDIA)    • Difficulty of Paying Living Expenses: Not very hard   Transportation Needs: No Transportation Needs (9/14/2024)    PRAPARE - Transportation    • Lack of Transportation (Medical): No    • Lack of Transportation (Non-Medical): No   Housing Stability: Low Risk  (9/14/2024)    Housing Stability Vital Sign    • Unable to Pay for Housing in the Last Year: No    • Number of Times Moved in the Last Year: 1    • Homeless in the Last Year: No       Vital signs:  132/74, 97.2, 18, 88, 96%    Objective  Physical Exam  HENT:      Head: Normocephalic and atraumatic.      Nose: Nose normal.      Mouth/Throat:      Mouth: Mucous membranes are moist.      Pharynx: Oropharynx is clear.   Eyes:      Extraocular Movements: Extraocular movements intact.      Pupils: Pupils are equal, round, and reactive to light.   Cardiovascular:      Rate and Rhythm: Normal rate and regular rhythm.   Pulmonary:      Effort: No respiratory distress.      Breath sounds: Normal breath sounds. No wheezing, rhonchi or rales.   Abdominal:      General: Bowel sounds are normal. There is no distension.      Palpations: Abdomen is soft.      Tenderness: There is no abdominal tenderness. There is no guarding.   Musculoskeletal:      Right lower leg: No edema.      Left lower leg: No edema.   Skin:     General: Skin is warm and dry.   Neurological:      Mental Status: She is alert. Mental status is at baseline.         Assessment/Plan  Problem List Items Addressed This Visit       Essential hypertension, benign - Primary     Continue current medications          Fatigue     PT OT         Closed fracture of right ankle, initial encounter     Pain meds  PT OT         Hyponatremia     Monitor lab work          Hospital records reviewed  Medications,  treatments, and labs reviewed  Continue medications and treatments as listed in EMR  Discussed with nursing and therapy    Jesse Coronel DO    Scribe Attestation  ISaida Scribe   attest that this documentation has been prepared under the direction and in the presence of Jesse Coronel DO    Provider Attestation - Scribe documentation  All medical record entries made by the Scribe were at my direction and personally dictated by me. I have reviewed the chart and agree that the record accurately reflects my personal performance of the history, physical exam, discussion and plan.      Electronically Signed By: Jesse Coronel DO   10/7/24  2:59 PM

## 2024-10-01 ENCOUNTER — NURSING HOME VISIT (OUTPATIENT)
Dept: POST ACUTE CARE | Facility: EXTERNAL LOCATION | Age: 54
End: 2024-10-01

## 2024-10-01 ENCOUNTER — NURSING HOME VISIT (OUTPATIENT)
Dept: POST ACUTE CARE | Facility: EXTERNAL LOCATION | Age: 54
End: 2024-10-01
Payer: COMMERCIAL

## 2024-10-01 DIAGNOSIS — D64.9 ANEMIA, UNSPECIFIED TYPE: ICD-10-CM

## 2024-10-01 DIAGNOSIS — S82.891D CLOSED FRACTURE OF RIGHT ANKLE WITH ROUTINE HEALING, SUBSEQUENT ENCOUNTER: ICD-10-CM

## 2024-10-01 DIAGNOSIS — S82.891A CLOSED FRACTURE OF RIGHT ANKLE, INITIAL ENCOUNTER: Primary | ICD-10-CM

## 2024-10-01 DIAGNOSIS — R53.1 WEAKNESS: Primary | ICD-10-CM

## 2024-10-01 DIAGNOSIS — R53.83 OTHER FATIGUE: ICD-10-CM

## 2024-10-01 PROCEDURE — 99308 SBSQ NF CARE LOW MDM 20: CPT | Performed by: REGISTERED NURSE

## 2024-10-01 NOTE — LETTER
Patient: Stephanie Alatorre  : 1970    Encounter Date: 10/01/2024    PROGRESS NOTE    Subjective  Chief complaint: Stephanie Alatorre is a 54 y.o. female who is an acute skilled patient being seen and evaluated for weakness    HPI:  10/1/24 Patient admitted to SNF for therapy d/t weakness after recent hospitalization.   Patient requires assist with ADLs and transfers.  No new complaints.      Objective  Vital signs: 128/64, 97.5, 68, 18, 97%    Physical Exam  Constitutional:       General: She is not in acute distress.  Eyes:      Extraocular Movements: Extraocular movements intact.   Pulmonary:      Effort: Pulmonary effort is normal.   Musculoskeletal:      Cervical back: Neck supple.   Neurological:      Mental Status: She is alert.   Psychiatric:         Mood and Affect: Mood normal.         Behavior: Behavior is cooperative.         Assessment/Plan  Problem List Items Addressed This Visit       Anemia     Monitor hgb         Fatigue     PT/OT         Closed fracture of right ankle, initial encounter - Primary     Pain meds  PT OT          Medications, treatments, and labs reviewed  Continue medications and treatments as listed in [facility]    Scribe Attestation  I, Lance Quiroga   attest that this documentation has been prepared under the direction and in the presence of RJ Fernández.    Provider Attestation - Scribe documentation  All medical record entries made by the Scribe were at my direction and personally dictated by me. I have reviewed the chart and agree that the record accurately reflects my personal performance of the history, physical exam, discussion and plan.    RJ Fernández        Electronically Signed By: RJ Fernández   10/10/24  9:06 AM

## 2024-10-01 NOTE — PROGRESS NOTES
HISTORY & PHYSICAL    Subjective   Chief complaint: Stephnaie Alatorre is a 54 y.o. female who is a acute skilled care patient being seen and evaluated for multiple medical problems.  Patient presents for skilled therapy.    HPI:  54 y/o female PMHx HTN, mildly elevated liver enzymes, obesity presented to [facility] after mechanical fall at home.  In the ED patient found to have a right ankle fracture.  Orthopedic surgery consulted and patient went to have open reduction internal fixation of right ankle.  Her blood pressures were not at goal and antihypertensives were adjusted.  Her sodium as well as potassium were noted to be low.  Potassium was replaced and review of her home medications show that she takes hydrochlorothiazide.  Likely this is the cause of her electrolyte abnormalities, and I recommend that she stop taking the hydrochlorothiazide and this was changed to lisinopril 10 mg.  Commend patient follow-up outpatient with her PCP for additional adjustments to her blood pressure medications and follow-up labs.  Patient is stable to be discharged to skilled nursing facility for PT and OT.  Nonweightbearing right foot, follow-up with orthopedic surgery outpatient.        Past Medical History:   Diagnosis Date    Personal history of other (healed) physical injury and trauma 10/09/2015    History of sprain of ankle       Past Surgical History:   Procedure Laterality Date    OTHER SURGICAL HISTORY  04/07/2022    Cholecystectomy    OTHER SURGICAL HISTORY  04/07/2022    Kamila-en-Y gastric bypass       Family History   Problem Relation Name Age of Onset    Other (arteriosclerotic cardiovascular) Mother      Other (Other) Sister         Social History     Socioeconomic History    Marital status: Single   Tobacco Use    Smoking status: Never     Passive exposure: Never    Smokeless tobacco: Never   Substance and Sexual Activity    Alcohol use: Not Currently    Drug use: Not Currently    Sexual activity: Not Currently      Social Determinants of Health     Financial Resource Strain: Low Risk  (9/14/2024)    Overall Financial Resource Strain (CARDIA)     Difficulty of Paying Living Expenses: Not very hard   Transportation Needs: No Transportation Needs (9/14/2024)    PRAPARE - Transportation     Lack of Transportation (Medical): No     Lack of Transportation (Non-Medical): No   Housing Stability: Low Risk  (9/14/2024)    Housing Stability Vital Sign     Unable to Pay for Housing in the Last Year: No     Number of Times Moved in the Last Year: 1     Homeless in the Last Year: No       Vital signs:  132/74, 97.2, 18, 88, 96%    Objective   Physical Exam  HENT:      Head: Normocephalic and atraumatic.      Nose: Nose normal.      Mouth/Throat:      Mouth: Mucous membranes are moist.      Pharynx: Oropharynx is clear.   Eyes:      Extraocular Movements: Extraocular movements intact.      Pupils: Pupils are equal, round, and reactive to light.   Cardiovascular:      Rate and Rhythm: Normal rate and regular rhythm.   Pulmonary:      Effort: No respiratory distress.      Breath sounds: Normal breath sounds. No wheezing, rhonchi or rales.   Abdominal:      General: Bowel sounds are normal. There is no distension.      Palpations: Abdomen is soft.      Tenderness: There is no abdominal tenderness. There is no guarding.   Musculoskeletal:      Right lower leg: No edema.      Left lower leg: No edema.   Skin:     General: Skin is warm and dry.   Neurological:      Mental Status: She is alert. Mental status is at baseline.         Assessment/Plan   Problem List Items Addressed This Visit       Essential hypertension, benign - Primary     Continue current medications          Fatigue     PT OT         Closed fracture of right ankle, initial encounter     Pain meds  PT OT         Hyponatremia     Monitor lab work          Hospital records reviewed  Medications, treatments, and labs reviewed  Continue medications and treatments as listed in  EMR  Discussed with nursing and therapy    Jesse Coronel DO    Scribe Attestation  I, Saida Spenceribalec   attest that this documentation has been prepared under the direction and in the presence of Jesse Coronel DO    Provider Attestation - Scribe documentation  All medical record entries made by the Scribe were at my direction and personally dictated by me. I have reviewed the chart and agree that the record accurately reflects my personal performance of the history, physical exam, discussion and plan.

## 2024-10-01 NOTE — LETTER
Patient: Stephanie Alatorre  : 1970    Encounter Date: 10/01/2024    PROGRESS NOTE    Subjective  Chief complaint: Stephanie Alatorre is a 54 y.o. female who is an acute skilled patient being seen and evaluated for weakness    HPI:  Therapy has been working with the patient to improve strength and endurance with ADLs, transfers, and mobility. Patient continues to work toward goals. Patient is stable and has no new complaints. Nursing staff voices no new concerns today.    Objective  Vital signs: 146/78, 97, 72, 16, 98%    Physical Exam  Constitutional:       General: She is not in acute distress.  Eyes:      Extraocular Movements: Extraocular movements intact.   Pulmonary:      Effort: Pulmonary effort is normal.   Musculoskeletal:      Cervical back: Neck supple.   Neurological:      Mental Status: She is alert.   Psychiatric:         Mood and Affect: Mood normal.         Behavior: Behavior is cooperative.         Assessment/Plan  Problem List Items Addressed This Visit       Weakness - Primary     Encourage pt to ask for assistance          Closed fracture of right ankle     Pain mgmt             Medications, treatments, and labs reviewed  Continue medications and treatments as listed in [facility]    Scribe Attestation  ILuanne Scribe   attest that this documentation has been prepared under the direction and in the presence of RJ Fernández.    Provider Attestation - Scribe documentation  All medical record entries made by the Scribe were at my direction and personally dictated by me. I have reviewed the chart and agree that the record accurately reflects my personal performance of the history, physical exam, discussion and plan.    RJ Fernández        Electronically Signed By: RJ Fernández   24  4:22 PM

## 2024-10-02 ENCOUNTER — NURSING HOME VISIT (OUTPATIENT)
Dept: POST ACUTE CARE | Facility: EXTERNAL LOCATION | Age: 54
End: 2024-10-02
Payer: COMMERCIAL

## 2024-10-02 DIAGNOSIS — S82.891A CLOSED FRACTURE OF RIGHT ANKLE, INITIAL ENCOUNTER: Primary | ICD-10-CM

## 2024-10-02 DIAGNOSIS — I10 ESSENTIAL HYPERTENSION, BENIGN: ICD-10-CM

## 2024-10-02 PROCEDURE — 99308 SBSQ NF CARE LOW MDM 20: CPT | Performed by: REGISTERED NURSE

## 2024-10-02 NOTE — LETTER
Patient: Stephanie Alatorre  : 1970    Encounter Date: 10/02/2024    PROGRESS NOTE    Subjective  Chief complaint: Stephanie Alatorre is a 54 y.o. female who is an acute skilled patient being seen and evaluated for weakness    HPI:  10/2/24 Patient admitted to SNF for therapy d/t weakness after recent hospitalization.   Patient requires assist with ADLs and transfers.  No new complaints.      Objective  Vital signs: 122/65, 97, 84, 18, 95%    Physical Exam  Constitutional:       General: She is not in acute distress.  Eyes:      Extraocular Movements: Extraocular movements intact.   Pulmonary:      Effort: Pulmonary effort is normal.   Musculoskeletal:      Cervical back: Neck supple.   Neurological:      Mental Status: She is alert.   Psychiatric:         Mood and Affect: Mood normal.         Behavior: Behavior is cooperative.         Assessment/Plan  Problem List Items Addressed This Visit       Essential hypertension, benign     Continue current medications          Closed fracture of right ankle, initial encounter - Primary     Pain meds  PT OT          Medications, treatments, and labs reviewed  Continue medications and treatments as listed in [facility]    Scribe Attestation  ILuanne Scribe   attest that this documentation has been prepared under the direction and in the presence of RJ Fernández.    Provider Attestation - Scribe documentation  All medical record entries made by the Scribe were at my direction and personally dictated by me. I have reviewed the chart and agree that the record accurately reflects my personal performance of the history, physical exam, discussion and plan.    RJ Fernández        Electronically Signed By: RJ Fernández   10/9/24  9:44 PM

## 2024-10-03 NOTE — PROGRESS NOTES
PROGRESS NOTE    Subjective   Chief complaint: Stephanie Alatorre is a 54 y.o. female who is an acute skilled patient being seen and evaluated for weakness    HPI:  10/2/24 Patient admitted to SNF for therapy d/t weakness after recent hospitalization.   Patient requires assist with ADLs and transfers.  No new complaints.      Objective   Vital signs: 122/65, 97, 84, 18, 95%    Physical Exam  Constitutional:       General: She is not in acute distress.  Eyes:      Extraocular Movements: Extraocular movements intact.   Pulmonary:      Effort: Pulmonary effort is normal.   Musculoskeletal:      Cervical back: Neck supple.   Neurological:      Mental Status: She is alert.   Psychiatric:         Mood and Affect: Mood normal.         Behavior: Behavior is cooperative.         Assessment/Plan   Problem List Items Addressed This Visit       Essential hypertension, benign     Continue current medications          Closed fracture of right ankle, initial encounter - Primary     Pain meds  PT OT          Medications, treatments, and labs reviewed  Continue medications and treatments as listed in [facility]    Scribe Attestation  Luanne FOSTER Scribe   attest that this documentation has been prepared under the direction and in the presence of RJ Fernández.    Provider Attestation - Scribe documentation  All medical record entries made by the Scribe were at my direction and personally dictated by me. I have reviewed the chart and agree that the record accurately reflects my personal performance of the history, physical exam, discussion and plan.    RJ Fernández

## 2024-10-08 ENCOUNTER — NURSING HOME VISIT (OUTPATIENT)
Dept: POST ACUTE CARE | Facility: EXTERNAL LOCATION | Age: 54
End: 2024-10-08

## 2024-10-08 DIAGNOSIS — D64.9 ANEMIA, UNSPECIFIED TYPE: ICD-10-CM

## 2024-10-08 DIAGNOSIS — S82.891A CLOSED FRACTURE OF RIGHT ANKLE, INITIAL ENCOUNTER: ICD-10-CM

## 2024-10-08 DIAGNOSIS — I10 ESSENTIAL HYPERTENSION, BENIGN: Primary | ICD-10-CM

## 2024-10-08 PROCEDURE — 99308 SBSQ NF CARE LOW MDM 20: CPT | Performed by: REGISTERED NURSE

## 2024-10-08 NOTE — LETTER
Patient: Stephanie Alatorre  : 1970    Encounter Date: 10/08/2024    PROGRESS NOTE    Subjective  Chief complaint: Stephanie Alatorre is a 54 y.o. female who is an acute skilled patient being seen and evaluated for weakness    HPI:  10/8/24 Patient has no new complaints or concerns today.  Continues working towards goals in therapy.  Denies constitutional symptoms.    Objective  Vital signs: 128/64, 97.5, 68, 18, 97%    Physical Exam  Constitutional:       General: She is not in acute distress.  Eyes:      Extraocular Movements: Extraocular movements intact.   Pulmonary:      Effort: Pulmonary effort is normal.   Musculoskeletal:      Cervical back: Neck supple.   Neurological:      Mental Status: She is alert.   Psychiatric:         Mood and Affect: Mood normal.         Behavior: Behavior is cooperative.         Assessment/Plan  Problem List Items Addressed This Visit       Anemia     Monitor hgb         Essential hypertension, benign - Primary     Continue current medications          Closed fracture of right ankle, initial encounter     Pain meds  PT OT          Medications, treatments, and labs reviewed  Continue medications and treatments as listed in [facility]    Scribe Attestation  ILuanne Scribe   attest that this documentation has been prepared under the direction and in the presence of RJ Fernández.    Provider Attestation - Scribe documentation  All medical record entries made by the Scribe were at my direction and personally dictated by me. I have reviewed the chart and agree that the record accurately reflects my personal performance of the history, physical exam, discussion and plan.    RJ Fernández        Electronically Signed By: RJ Fernández   10/15/24  7:30 PM

## 2024-10-09 ENCOUNTER — PREP FOR PROCEDURE (OUTPATIENT)
Dept: SURGERY | Facility: HOSPITAL | Age: 54
End: 2024-10-09

## 2024-10-09 ENCOUNTER — NURSING HOME VISIT (OUTPATIENT)
Dept: POST ACUTE CARE | Facility: EXTERNAL LOCATION | Age: 54
End: 2024-10-09

## 2024-10-09 DIAGNOSIS — R53.1 WEAKNESS: ICD-10-CM

## 2024-10-09 DIAGNOSIS — S93.431D SYNDESMOTIC DISRUPTION OF RIGHT ANKLE, SUBSEQUENT ENCOUNTER: ICD-10-CM

## 2024-10-09 DIAGNOSIS — S82.891A CLOSED FRACTURE OF RIGHT ANKLE, INITIAL ENCOUNTER: Primary | ICD-10-CM

## 2024-10-09 DIAGNOSIS — D64.9 ANEMIA, UNSPECIFIED TYPE: ICD-10-CM

## 2024-10-09 DIAGNOSIS — S93.431A ANKLE SYNDESMOSIS DISRUPTION, RIGHT, INITIAL ENCOUNTER: Primary | ICD-10-CM

## 2024-10-09 PROCEDURE — 99308 SBSQ NF CARE LOW MDM 20: CPT | Performed by: REGISTERED NURSE

## 2024-10-09 RX ORDER — CEFAZOLIN SODIUM 2 G/100ML
2 INJECTION, SOLUTION INTRAVENOUS EVERY 8 HOURS
Status: CANCELLED | OUTPATIENT
Start: 2024-10-11 | End: 2024-10-12

## 2024-10-09 NOTE — H&P (VIEW-ONLY)
PATIENT ID: Stephanie Alatorre is a 54 y.o. female who presents for Post-op of the Right Ankle (ORIF 9/15/24).    History of Present Illness  The patient is a female who presents for evaluation of her right ankle. ABOUT 3+ WEEKS POST OP.  STATES SHE HAS NOT BEEN WEIGHT BEARING BUT FACILITY HAS USED ANKLE WEIGHTS FOR STRENGTHENING    She has been unable to walk due to the removal and subsequent reapplication of her splint.            Past Medical History:   Diagnosis Date   • GERD (gastroesophageal reflux disease)    • High cholesterol (CMS/HCC)    • High cholesterol (CMS/HCC)    • Hypertension (CMS/HCC)        Past Surgical History:   Procedure Laterality Date   • ORIF ANKLE FRACTURE Right 09/15/2024        Current Outpatient Medications on File Prior to Visit   Medication Sig Dispense Refill   • acetaminophen (Tylenol 8 Hour) 650 MG ER tablet Take 500 mg by mouth every 8 (eight) hours if needed     • aspirin 81 MG EC tablet Take 81 mg by mouth in the morning.     • atorvastatin (Lipitor) 40 MG tablet Take 40 mg by mouth in the morning.     • b complex vitamins capsule Take 1 capsule by mouth in the morning.     • Calcium Carb-Cholecalciferol (Calcium Plus Vitamin D) 500-5 MG-MCG tablet Take 1 tablet by mouth in the morning.     • cholecalciferol (Vitamin D-3) 125 MCG (5000 UT) tablet Take 5,000 Units by mouth in the morning.     • gabapentin (Neurontin) 100 MG capsule Take 100 mg by mouth in the morning and 100 mg at noon and 100 mg in the evening.     • hydroCHLOROthiazide (HYDRODiuril) 12.5 MG tablet TAKE 1 TABLET BY MOUTH ONCE DAILY IN THE MORNING BEFORE BREAKFAST     • lisinopril 10 MG tablet Take 10 mg by mouth in the morning.     • metFORMIN (Glucophage) 500 MG tablet Take by mouth in the evening. Take with meals     • Multiple Vitamin (Multi Vitamin Daily) tablet Take 1 tablet by mouth in the morning.     • oxyCODONE (Roxicodone) 5 MG immediate release tablet Take 5 mg by mouth every 4 (four) hours if needed      • pantoprazole (ProtoNix) 20 MG EC tablet Take 20 mg by mouth in the morning. Take before meals.     • Turmeric (CurcuPlex-95) 500 MG capsule Take 1 capsule by mouth     • zinc sulfate (Zincate) 220 (50 Zn) MG capsule Take 50 mg of elemental zinc by mouth in the morning.       No current facility-administered medications on file prior to visit.        Social Connections: Not on file        Allergies   Allergen Reactions   • Diphenhydramine Anxiety and Palpitations        No family history on file.     OBJECTIVE:      Vitals:    10/09/24 1005   Temp: 97.6 °F       Physical Exam  Incision in the musculoskeletal system is good. There is an area of pressure with superficial scar scabbing on the posterior aspect of the heel.       Results  Imaging  X-rays AP, lateral, mortise view of the right ankle show distal fibular fracture in good position with medial shift and widening OF SYNDESMOSIS. X-rays show widening mortise. The fibular fracture is healing in good position. JOINT SPACE NARROWING IS SEEN       Assessment & Plan  1. Postoperative status following open reduction and internal fixation (ORIF) with syndesmosis disruption.  A CT scan will be performed immediately to assess the condition of her ankle. A revision ORIF or syndesmosis fixation is planned within the next few days on an outpatient basis.    JUSTICE NWB.  DISCUSSED SURGERY FOR EITHER REVISION ORIF OR SYNDESMOSIS FIXATION WITH TIGHTROPE ANCHOR

## 2024-10-09 NOTE — PROGRESS NOTES
PROGRESS NOTE    Subjective   Chief complaint: Stephanie Alatorre is a 54 y.o. female who is an acute skilled patient being seen and evaluated for weakness    HPI:  10/8/24 Patient has no new complaints or concerns today.  Continues working towards goals in therapy.  Denies constitutional symptoms.    Objective   Vital signs: 128/64, 97.5, 68, 18, 97%    Physical Exam  Constitutional:       General: She is not in acute distress.  Eyes:      Extraocular Movements: Extraocular movements intact.   Pulmonary:      Effort: Pulmonary effort is normal.   Musculoskeletal:      Cervical back: Neck supple.   Neurological:      Mental Status: She is alert.   Psychiatric:         Mood and Affect: Mood normal.         Behavior: Behavior is cooperative.         Assessment/Plan   Problem List Items Addressed This Visit       Anemia     Monitor hgb         Essential hypertension, benign - Primary     Continue current medications          Closed fracture of right ankle, initial encounter     Pain meds  PT OT          Medications, treatments, and labs reviewed  Continue medications and treatments as listed in [facility]    Scribe Attestation  Luanne FOSTER Scribe   attest that this documentation has been prepared under the direction and in the presence of RJ Fernández.    Provider Attestation - Scribe documentation  All medical record entries made by the Scribe were at my direction and personally dictated by me. I have reviewed the chart and agree that the record accurately reflects my personal performance of the history, physical exam, discussion and plan.    RJ Fernández

## 2024-10-09 NOTE — LETTER
Patient: Stephanie Alatorre  : 1970    Encounter Date: 10/09/2024    PROGRESS NOTE    Subjective  Chief complaint: Stephanie Alatorre is a 54 y.o. female who is an acute skilled patient being seen and evaluated for weakness    HPI:  10/8/24 Patient has no new complaints or concerns today.  Continues working towards goals in therapy.  Denies constitutional symptoms.    10/9/24 Therapy has been working with the patient to improve strength and endurance with ADLs, transfers, and mobility.  Patient continues to work toward goals.  Patient is stable and has no new complaints.  Nursing staff voices no new concerns today.    Objective  Vital signs: 126/62, 97.5, 72, 18, 97%    Physical Exam  Constitutional:       General: She is not in acute distress.  Eyes:      Extraocular Movements: Extraocular movements intact.   Pulmonary:      Effort: Pulmonary effort is normal.   Musculoskeletal:      Cervical back: Neck supple.   Neurological:      Mental Status: She is alert.   Psychiatric:         Mood and Affect: Mood normal.         Behavior: Behavior is cooperative.         Assessment/Plan  Problem List Items Addressed This Visit       Anemia     Monitor hgb         Weakness     Pt/ot          Closed fracture of right ankle, initial encounter - Primary     Pain meds  PT OT          Medications, treatments, and labs reviewed  Continue medications and treatments as listed in [facility]    Scribe Attestation  Luanne FOSTER Scribe   attest that this documentation has been prepared under the direction and in the presence of RJ Fernández.    Provider Attestation - Scribe documentation  All medical record entries made by the Scribe were at my direction and personally dictated by me. I have reviewed the chart and agree that the record accurately reflects my personal performance of the history, physical exam, discussion and plan.    RJ Fernández        Electronically Signed By: Jesse LEWIS  STEPHANIE Morales-CNP   10/16/24  7:50 PM

## 2024-10-09 NOTE — PROGRESS NOTES
PROGRESS NOTE    Subjective   Chief complaint: Stephanie Alatorre is a 54 y.o. female who is an acute skilled patient being seen and evaluated for weakness    HPI:  10/1/24 Patient admitted to SNF for therapy d/t weakness after recent hospitalization.   Patient requires assist with ADLs and transfers.  No new complaints.      Objective   Vital signs: 128/64, 97.5, 68, 18, 97%    Physical Exam  Constitutional:       General: She is not in acute distress.  Eyes:      Extraocular Movements: Extraocular movements intact.   Pulmonary:      Effort: Pulmonary effort is normal.   Musculoskeletal:      Cervical back: Neck supple.   Neurological:      Mental Status: She is alert.   Psychiatric:         Mood and Affect: Mood normal.         Behavior: Behavior is cooperative.         Assessment/Plan   Problem List Items Addressed This Visit       Anemia     Monitor hgb         Fatigue     PT/OT         Closed fracture of right ankle, initial encounter - Primary     Pain meds  PT OT          Medications, treatments, and labs reviewed  Continue medications and treatments as listed in [facility]    Scribe Attestation  Luanne FOSTER Scribe   attest that this documentation has been prepared under the direction and in the presence of RJ Fernández.    Provider Attestation - Scribe documentation  All medical record entries made by the Scribe were at my direction and personally dictated by me. I have reviewed the chart and agree that the record accurately reflects my personal performance of the history, physical exam, discussion and plan.    RJ Fernández

## 2024-10-10 RX ORDER — BISACODYL 10 MG/1
1 SUPPOSITORY RECTAL DAILY PRN
COMMUNITY

## 2024-10-10 RX ORDER — ADHESIVE BANDAGE
15 BANDAGE TOPICAL DAILY PRN
COMMUNITY

## 2024-10-10 RX ORDER — ALUMINUM HYDROXIDE, MAGNESIUM HYDROXIDE, AND SIMETHICONE 1200; 120; 1200 MG/30ML; MG/30ML; MG/30ML
30 SUSPENSION ORAL EVERY 4 HOURS PRN
COMMUNITY

## 2024-10-10 RX ORDER — MELATONIN 3 MG
3 CAPSULE ORAL DAILY PRN
COMMUNITY

## 2024-10-10 NOTE — PROGRESS NOTES
PROGRESS NOTE    Subjective   Chief complaint: Stephanie Alatorre is a 54 y.o. female who is an acute skilled patient being seen and evaluated for weakness    HPI:  10/8/24 Patient has no new complaints or concerns today.  Continues working towards goals in therapy.  Denies constitutional symptoms.    10/9/24 Therapy has been working with the patient to improve strength and endurance with ADLs, transfers, and mobility.  Patient continues to work toward goals.  Patient is stable and has no new complaints.  Nursing staff voices no new concerns today.    Objective   Vital signs: 126/62, 97.5, 72, 18, 97%    Physical Exam  Constitutional:       General: She is not in acute distress.  Eyes:      Extraocular Movements: Extraocular movements intact.   Pulmonary:      Effort: Pulmonary effort is normal.   Musculoskeletal:      Cervical back: Neck supple.   Neurological:      Mental Status: She is alert.   Psychiatric:         Mood and Affect: Mood normal.         Behavior: Behavior is cooperative.         Assessment/Plan   Problem List Items Addressed This Visit       Anemia     Monitor hgb         Weakness     Pt/ot          Closed fracture of right ankle, initial encounter - Primary     Pain meds  PT OT          Medications, treatments, and labs reviewed  Continue medications and treatments as listed in [facility]    Scribe Attestation  I, Lance Quiroga   attest that this documentation has been prepared under the direction and in the presence of RJ Fernández.    Provider Attestation - Scribe documentation  All medical record entries made by the Scribe were at my direction and personally dictated by me. I have reviewed the chart and agree that the record accurately reflects my personal performance of the history, physical exam, discussion and plan.    RJ Fernández

## 2024-10-10 NOTE — PREPROCEDURE INSTRUCTIONS
Current Medications   Medication Instructions    aspirin 81 mg EC tablet {OR PAT MED INSTRUCTIONS:32522}    atorvastatin (Lipitor) 40 mg tablet {OR PAT MED INSTRUCTIONS:71377}    b complex vitamins capsule {OR PAT MED INSTRUCTIONS:10299}    calcium carbonate-vitamin D3 500 mg-5 mcg (200 unit) tablet {OR PAT MED INSTRUCTIONS:53863}    cholecalciferol (Vitamin D-3) 5,000 Units tablet {OR PAT MED INSTRUCTIONS:37278}    cholecalciferol, vitamin D3, 250 mcg (10,000 unit) capsule {OR PAT MED INSTRUCTIONS:99922}    cyanocobalamin (Vitamin B-12) 1,000 mcg tablet {OR PAT MED INSTRUCTIONS:13684}    gabapentin (Neurontin) 100 mg capsule {OR PAT MED INSTRUCTIONS:27150}    lisinopril 10 mg tablet {OR PAT MED INSTRUCTIONS:04625}    multivitamin (Daily Multi-Vitamin) tablet {OR PAT MED INSTRUCTIONS:73501}    oxyCODONE (Roxicodone) 5 mg immediate release tablet {OR PAT MED INSTRUCTIONS:85788}    pantoprazole (ProtoNix) 20 mg EC tablet {OR PAT MED INSTRUCTIONS:95049}    pyridoxine (Vitamin B-6) 50 mg tablet {OR PAT MED INSTRUCTIONS:32358}    turmeric root extract 500 mg capsule {OR PAT MED INSTRUCTIONS:29489}    zinc sulfate (Zincate) 220 (50 Zn) MG capsule {OR PAT MED INSTRUCTIONS:28133}                       NPO Instructions:    {NPO Instructions:45875}    Additional Instructions:     {[facility] AVS INSTR:45164}

## 2024-10-11 ENCOUNTER — ANESTHESIA EVENT (OUTPATIENT)
Dept: OPERATING ROOM | Facility: HOSPITAL | Age: 54
End: 2024-10-11
Payer: COMMERCIAL

## 2024-10-11 ENCOUNTER — ANESTHESIA (OUTPATIENT)
Dept: OPERATING ROOM | Facility: HOSPITAL | Age: 54
End: 2024-10-11
Payer: COMMERCIAL

## 2024-10-11 ENCOUNTER — HOSPITAL ENCOUNTER (INPATIENT)
Facility: HOSPITAL | Age: 54
LOS: 4 days | Discharge: NURSING, REHAB OR LONG-TERM CARE | DRG: 493 | End: 2024-10-15
Attending: ORTHOPAEDIC SURGERY | Admitting: ORTHOPAEDIC SURGERY
Payer: COMMERCIAL

## 2024-10-11 ENCOUNTER — APPOINTMENT (OUTPATIENT)
Dept: RADIOLOGY | Facility: HOSPITAL | Age: 54
DRG: 493 | End: 2024-10-11
Payer: COMMERCIAL

## 2024-10-11 DIAGNOSIS — S93.431D SYNDESMOTIC DISRUPTION OF RIGHT ANKLE, SUBSEQUENT ENCOUNTER: ICD-10-CM

## 2024-10-11 DIAGNOSIS — S93.431A ANKLE SYNDESMOSIS DISRUPTION, RIGHT, INITIAL ENCOUNTER: Primary | ICD-10-CM

## 2024-10-11 LAB
APTT PPP: 33 SECONDS (ref 27–38)
B-HCG SERPL-ACNC: <2 MIU/ML
INR PPP: 1.1 (ref 0.9–1.1)
PROTHROMBIN TIME: 12.6 SECONDS (ref 9.8–12.8)

## 2024-10-11 PROCEDURE — 2500000005 HC RX 250 GENERAL PHARMACY W/O HCPCS: Performed by: ORTHOPAEDIC SURGERY

## 2024-10-11 PROCEDURE — 2500000005 HC RX 250 GENERAL PHARMACY W/O HCPCS: Performed by: INTERNAL MEDICINE

## 2024-10-11 PROCEDURE — 2500000004 HC RX 250 GENERAL PHARMACY W/ HCPCS (ALT 636 FOR OP/ED): Performed by: CLINICAL NURSE SPECIALIST

## 2024-10-11 PROCEDURE — 85610 PROTHROMBIN TIME: CPT | Performed by: ORTHOPAEDIC SURGERY

## 2024-10-11 PROCEDURE — 36415 COLL VENOUS BLD VENIPUNCTURE: CPT | Performed by: ORTHOPAEDIC SURGERY

## 2024-10-11 PROCEDURE — 3600000009 HC OR TIME - EACH INCREMENTAL 1 MINUTE - PROCEDURE LEVEL FOUR: Performed by: ORTHOPAEDIC SURGERY

## 2024-10-11 PROCEDURE — 2500000001 HC RX 250 WO HCPCS SELF ADMINISTERED DRUGS (ALT 637 FOR MEDICARE OP): Performed by: CLINICAL NURSE SPECIALIST

## 2024-10-11 PROCEDURE — C1713 ANCHOR/SCREW BN/BN,TIS/BN: HCPCS | Performed by: ORTHOPAEDIC SURGERY

## 2024-10-11 PROCEDURE — 3600000004 HC OR TIME - INITIAL BASE CHARGE - PROCEDURE LEVEL FOUR: Performed by: ORTHOPAEDIC SURGERY

## 2024-10-11 PROCEDURE — 2500000004 HC RX 250 GENERAL PHARMACY W/ HCPCS (ALT 636 FOR OP/ED): Performed by: ANESTHESIOLOGY

## 2024-10-11 PROCEDURE — 1100000001 HC PRIVATE ROOM DAILY

## 2024-10-11 PROCEDURE — 2720000007 HC OR 272 NO HCPCS: Performed by: ORTHOPAEDIC SURGERY

## 2024-10-11 PROCEDURE — 84702 CHORIONIC GONADOTROPIN TEST: CPT | Performed by: ORTHOPAEDIC SURGERY

## 2024-10-11 PROCEDURE — 2780000003 HC OR 278 NO HCPCS: Performed by: ORTHOPAEDIC SURGERY

## 2024-10-11 PROCEDURE — 76000 FLUOROSCOPY <1 HR PHYS/QHP: CPT

## 2024-10-11 PROCEDURE — 7100000002 HC RECOVERY ROOM TIME - EACH INCREMENTAL 1 MINUTE: Performed by: ORTHOPAEDIC SURGERY

## 2024-10-11 PROCEDURE — 7100000001 HC RECOVERY ROOM TIME - INITIAL BASE CHARGE: Performed by: ORTHOPAEDIC SURGERY

## 2024-10-11 PROCEDURE — 2500000004 HC RX 250 GENERAL PHARMACY W/ HCPCS (ALT 636 FOR OP/ED): Performed by: ORTHOPAEDIC SURGERY

## 2024-10-11 PROCEDURE — 0SSF04Z REPOSITION RIGHT ANKLE JOINT WITH INTERNAL FIXATION DEVICE, OPEN APPROACH: ICD-10-PCS | Performed by: ORTHOPAEDIC SURGERY

## 2024-10-11 PROCEDURE — 2500000004 HC RX 250 GENERAL PHARMACY W/ HCPCS (ALT 636 FOR OP/ED): Mod: JZ | Performed by: INTERNAL MEDICINE

## 2024-10-11 PROCEDURE — 3700000001 HC GENERAL ANESTHESIA TIME - INITIAL BASE CHARGE: Performed by: ORTHOPAEDIC SURGERY

## 2024-10-11 PROCEDURE — 3700000002 HC GENERAL ANESTHESIA TIME - EACH INCREMENTAL 1 MINUTE: Performed by: ORTHOPAEDIC SURGERY

## 2024-10-11 PROCEDURE — 2500000001 HC RX 250 WO HCPCS SELF ADMINISTERED DRUGS (ALT 637 FOR MEDICARE OP): Performed by: ORTHOPAEDIC SURGERY

## 2024-10-11 DEVICE — K-LESS T-ROPE W/DRV, SYN REPR, SS
Type: IMPLANTABLE DEVICE | Site: ANKLE | Status: FUNCTIONAL
Brand: ARTHREX®

## 2024-10-11 RX ORDER — ALBUTEROL SULFATE 0.83 MG/ML
2.5 SOLUTION RESPIRATORY (INHALATION) ONCE AS NEEDED
Status: DISCONTINUED | OUTPATIENT
Start: 2024-10-11 | End: 2024-10-11 | Stop reason: HOSPADM

## 2024-10-11 RX ORDER — SODIUM CHLORIDE, SODIUM LACTATE, POTASSIUM CHLORIDE, CALCIUM CHLORIDE 600; 310; 30; 20 MG/100ML; MG/100ML; MG/100ML; MG/100ML
INJECTION, SOLUTION INTRAVENOUS CONTINUOUS PRN
Status: DISCONTINUED | OUTPATIENT
Start: 2024-10-11 | End: 2024-10-11

## 2024-10-11 RX ORDER — VIT C/E/ZN/COPPR/LUTEIN/ZEAXAN 250MG-90MG
10000 CAPSULE ORAL EVERY OTHER DAY
Status: DISCONTINUED | OUTPATIENT
Start: 2024-10-12 | End: 2024-10-15 | Stop reason: HOSPADM

## 2024-10-11 RX ORDER — PROPOFOL 10 MG/ML
INJECTION, EMULSION INTRAVENOUS AS NEEDED
Status: DISCONTINUED | OUTPATIENT
Start: 2024-10-11 | End: 2024-10-11

## 2024-10-11 RX ORDER — CHOLECALCIFEROL (VITAMIN D3) 25 MCG
5000 TABLET ORAL DAILY
Status: DISCONTINUED | OUTPATIENT
Start: 2024-10-11 | End: 2024-10-15 | Stop reason: HOSPADM

## 2024-10-11 RX ORDER — KETOROLAC TROMETHAMINE 30 MG/ML
15 INJECTION, SOLUTION INTRAMUSCULAR; INTRAVENOUS ONCE
Status: COMPLETED | OUTPATIENT
Start: 2024-10-11 | End: 2024-10-11

## 2024-10-11 RX ORDER — CETIRIZINE HYDROCHLORIDE 10 MG/1
10 TABLET ORAL DAILY PRN
Status: DISCONTINUED | OUTPATIENT
Start: 2024-10-11 | End: 2024-10-15 | Stop reason: HOSPADM

## 2024-10-11 RX ORDER — DIPHENHYDRAMINE HCL 25 MG
25 CAPSULE ORAL EVERY 6 HOURS PRN
Status: DISCONTINUED | OUTPATIENT
Start: 2024-10-11 | End: 2024-10-15 | Stop reason: HOSPADM

## 2024-10-11 RX ORDER — LISINOPRIL 10 MG/1
10 TABLET ORAL DAILY
Status: DISCONTINUED | OUTPATIENT
Start: 2024-10-11 | End: 2024-10-15 | Stop reason: HOSPADM

## 2024-10-11 RX ORDER — ASPIRIN 325 MG
325 TABLET, DELAYED RELEASE (ENTERIC COATED) ORAL ONCE
Status: COMPLETED | OUTPATIENT
Start: 2024-10-11 | End: 2024-10-11

## 2024-10-11 RX ORDER — MEPERIDINE HYDROCHLORIDE 25 MG/ML
12.5 INJECTION INTRAMUSCULAR; INTRAVENOUS; SUBCUTANEOUS EVERY 10 MIN PRN
Status: DISCONTINUED | OUTPATIENT
Start: 2024-10-11 | End: 2024-10-11 | Stop reason: HOSPADM

## 2024-10-11 RX ORDER — ATORVASTATIN CALCIUM 40 MG/1
40 TABLET, FILM COATED ORAL DAILY
Status: DISCONTINUED | OUTPATIENT
Start: 2024-10-11 | End: 2024-10-15 | Stop reason: HOSPADM

## 2024-10-11 RX ORDER — SUCCINYLCHOLINE CHLORIDE 20 MG/ML INJECTION SOLUTION
SOLUTION AS NEEDED
Status: DISCONTINUED | OUTPATIENT
Start: 2024-10-11 | End: 2024-10-11

## 2024-10-11 RX ORDER — MORPHINE SULFATE 2 MG/ML
2 INJECTION, SOLUTION INTRAMUSCULAR; INTRAVENOUS EVERY 4 HOURS PRN
Status: DISCONTINUED | OUTPATIENT
Start: 2024-10-11 | End: 2024-10-15 | Stop reason: HOSPADM

## 2024-10-11 RX ORDER — BISACODYL 5 MG
10 TABLET, DELAYED RELEASE (ENTERIC COATED) ORAL DAILY PRN
Status: DISCONTINUED | OUTPATIENT
Start: 2024-10-11 | End: 2024-10-15 | Stop reason: HOSPADM

## 2024-10-11 RX ORDER — CEFAZOLIN 1 G/1
INJECTION, POWDER, FOR SOLUTION INTRAVENOUS AS NEEDED
Status: DISCONTINUED | OUTPATIENT
Start: 2024-10-11 | End: 2024-10-11

## 2024-10-11 RX ORDER — OXYCODONE HYDROCHLORIDE 5 MG/1
5 TABLET ORAL EVERY 4 HOURS PRN
Status: DISCONTINUED | OUTPATIENT
Start: 2024-10-11 | End: 2024-10-15 | Stop reason: HOSPADM

## 2024-10-11 RX ORDER — ASPIRIN 81 MG/1
81 TABLET ORAL DAILY
Status: DISCONTINUED | OUTPATIENT
Start: 2024-10-12 | End: 2024-10-15 | Stop reason: HOSPADM

## 2024-10-11 RX ORDER — METOCLOPRAMIDE 10 MG/1
10 TABLET ORAL EVERY 6 HOURS PRN
Status: DISCONTINUED | OUTPATIENT
Start: 2024-10-11 | End: 2024-10-15 | Stop reason: HOSPADM

## 2024-10-11 RX ORDER — MEPERIDINE HYDROCHLORIDE 25 MG/ML
INJECTION INTRAMUSCULAR; INTRAVENOUS; SUBCUTANEOUS AS NEEDED
Status: DISCONTINUED | OUTPATIENT
Start: 2024-10-11 | End: 2024-10-11

## 2024-10-11 RX ORDER — TRAMADOL HYDROCHLORIDE 50 MG/1
50 TABLET ORAL EVERY 6 HOURS PRN
Status: DISCONTINUED | OUTPATIENT
Start: 2024-10-11 | End: 2024-10-15 | Stop reason: HOSPADM

## 2024-10-11 RX ORDER — ONDANSETRON HYDROCHLORIDE 2 MG/ML
4 INJECTION, SOLUTION INTRAVENOUS ONCE AS NEEDED
Status: DISCONTINUED | OUTPATIENT
Start: 2024-10-11 | End: 2024-10-11 | Stop reason: HOSPADM

## 2024-10-11 RX ORDER — LANOLIN ALCOHOL/MO/W.PET/CERES
1000 CREAM (GRAM) TOPICAL DAILY
Status: DISCONTINUED | OUTPATIENT
Start: 2024-10-11 | End: 2024-10-15 | Stop reason: HOSPADM

## 2024-10-11 RX ORDER — METOPROLOL TARTRATE 1 MG/ML
INJECTION, SOLUTION INTRAVENOUS AS NEEDED
Status: DISCONTINUED | OUTPATIENT
Start: 2024-10-11 | End: 2024-10-11

## 2024-10-11 RX ORDER — ROCURONIUM BROMIDE 10 MG/ML
INJECTION, SOLUTION INTRAVENOUS AS NEEDED
Status: DISCONTINUED | OUTPATIENT
Start: 2024-10-11 | End: 2024-10-11

## 2024-10-11 RX ORDER — ACETAMINOPHEN 325 MG/1
975 TABLET ORAL EVERY 6 HOURS
Status: DISPENSED | OUTPATIENT
Start: 2024-10-11 | End: 2024-10-13

## 2024-10-11 RX ORDER — MIDAZOLAM HYDROCHLORIDE 1 MG/ML
INJECTION, SOLUTION INTRAMUSCULAR; INTRAVENOUS AS NEEDED
Status: DISCONTINUED | OUTPATIENT
Start: 2024-10-11 | End: 2024-10-11

## 2024-10-11 RX ORDER — DEXAMETHASONE SODIUM PHOSPHATE 10 MG/ML
6 INJECTION INTRAMUSCULAR; INTRAVENOUS ONCE
Status: DISCONTINUED | OUTPATIENT
Start: 2024-10-11 | End: 2024-10-11 | Stop reason: HOSPADM

## 2024-10-11 RX ORDER — PANTOPRAZOLE SODIUM 20 MG/1
20 TABLET, DELAYED RELEASE ORAL
Status: DISCONTINUED | OUTPATIENT
Start: 2024-10-12 | End: 2024-10-15 | Stop reason: HOSPADM

## 2024-10-11 RX ORDER — OXYCODONE HYDROCHLORIDE 5 MG/1
10 TABLET ORAL EVERY 6 HOURS PRN
Status: DISCONTINUED | OUTPATIENT
Start: 2024-10-11 | End: 2024-10-15 | Stop reason: HOSPADM

## 2024-10-11 RX ORDER — ACETAMINOPHEN 325 MG/1
650 TABLET ORAL EVERY 4 HOURS PRN
Status: DISCONTINUED | OUTPATIENT
Start: 2024-10-11 | End: 2024-10-11 | Stop reason: HOSPADM

## 2024-10-11 RX ORDER — CYCLOBENZAPRINE HCL 10 MG
10 TABLET ORAL 3 TIMES DAILY PRN
Status: DISCONTINUED | OUTPATIENT
Start: 2024-10-11 | End: 2024-10-15 | Stop reason: HOSPADM

## 2024-10-11 RX ORDER — ONDANSETRON HYDROCHLORIDE 2 MG/ML
4 INJECTION, SOLUTION INTRAVENOUS EVERY 8 HOURS PRN
Status: DISCONTINUED | OUTPATIENT
Start: 2024-10-11 | End: 2024-10-15 | Stop reason: HOSPADM

## 2024-10-11 RX ORDER — WATER
50 LIQUID (ML) MISCELLANEOUS
Status: DISCONTINUED | OUTPATIENT
Start: 2024-10-11 | End: 2024-10-15 | Stop reason: HOSPADM

## 2024-10-11 RX ORDER — CEFAZOLIN SODIUM 2 G/100ML
2 INJECTION, SOLUTION INTRAVENOUS EVERY 8 HOURS
Status: DISCONTINUED | OUTPATIENT
Start: 2024-10-11 | End: 2024-10-11 | Stop reason: HOSPADM

## 2024-10-11 RX ORDER — METOCLOPRAMIDE HYDROCHLORIDE 5 MG/ML
10 INJECTION INTRAMUSCULAR; INTRAVENOUS EVERY 6 HOURS PRN
Status: DISCONTINUED | OUTPATIENT
Start: 2024-10-11 | End: 2024-10-15 | Stop reason: HOSPADM

## 2024-10-11 RX ORDER — POLYETHYLENE GLYCOL 3350 17 G/17G
17 POWDER, FOR SOLUTION ORAL DAILY
Status: DISCONTINUED | OUTPATIENT
Start: 2024-10-11 | End: 2024-10-15 | Stop reason: HOSPADM

## 2024-10-11 RX ORDER — LIDOCAINE HCL/PF 100 MG/5ML
SYRINGE (ML) INTRAVENOUS AS NEEDED
Status: DISCONTINUED | OUTPATIENT
Start: 2024-10-11 | End: 2024-10-11

## 2024-10-11 RX ORDER — FENTANYL CITRATE 50 UG/ML
INJECTION, SOLUTION INTRAMUSCULAR; INTRAVENOUS AS NEEDED
Status: DISCONTINUED | OUTPATIENT
Start: 2024-10-11 | End: 2024-10-11

## 2024-10-11 RX ORDER — ONDANSETRON 4 MG/1
4 TABLET, FILM COATED ORAL EVERY 8 HOURS PRN
Status: DISCONTINUED | OUTPATIENT
Start: 2024-10-11 | End: 2024-10-15 | Stop reason: HOSPADM

## 2024-10-11 SDOH — SOCIAL STABILITY: SOCIAL INSECURITY: HAVE YOU HAD ANY THOUGHTS OF HARMING ANYONE ELSE?: NO

## 2024-10-11 SDOH — HEALTH STABILITY: MENTAL HEALTH: CURRENT SMOKER: 0

## 2024-10-11 SDOH — ECONOMIC STABILITY: INCOME INSECURITY: IN THE PAST 12 MONTHS HAS THE ELECTRIC, GAS, OIL, OR WATER COMPANY THREATENED TO SHUT OFF SERVICES IN YOUR HOME?: NO

## 2024-10-11 SDOH — SOCIAL STABILITY: SOCIAL INSECURITY: HAS ANYONE EVER THREATENED TO HURT YOUR FAMILY OR YOUR PETS?: NO

## 2024-10-11 SDOH — SOCIAL STABILITY: SOCIAL INSECURITY: WITHIN THE LAST YEAR, HAVE YOU BEEN HUMILIATED OR EMOTIONALLY ABUSED IN OTHER WAYS BY YOUR PARTNER OR EX-PARTNER?: NO

## 2024-10-11 SDOH — ECONOMIC STABILITY: HOUSING INSECURITY: IN THE LAST 12 MONTHS, WAS THERE A TIME WHEN YOU WERE NOT ABLE TO PAY THE MORTGAGE OR RENT ON TIME?: NO

## 2024-10-11 SDOH — ECONOMIC STABILITY: FOOD INSECURITY: WITHIN THE PAST 12 MONTHS, THE FOOD YOU BOUGHT JUST DIDN'T LAST AND YOU DIDN'T HAVE MONEY TO GET MORE.: NEVER TRUE

## 2024-10-11 SDOH — SOCIAL STABILITY: SOCIAL INSECURITY: WITHIN THE LAST YEAR, HAVE YOU BEEN AFRAID OF YOUR PARTNER OR EX-PARTNER?: NO

## 2024-10-11 SDOH — SOCIAL STABILITY: SOCIAL INSECURITY: DO YOU FEEL ANYONE HAS EXPLOITED OR TAKEN ADVANTAGE OF YOU FINANCIALLY OR OF YOUR PERSONAL PROPERTY?: NO

## 2024-10-11 SDOH — SOCIAL STABILITY: SOCIAL INSECURITY
WITHIN THE LAST YEAR, HAVE YOU BEEN RAPED OR FORCED TO HAVE ANY KIND OF SEXUAL ACTIVITY BY YOUR PARTNER OR EX-PARTNER?: NO

## 2024-10-11 SDOH — ECONOMIC STABILITY: FOOD INSECURITY: WITHIN THE PAST 12 MONTHS, YOU WORRIED THAT YOUR FOOD WOULD RUN OUT BEFORE YOU GOT THE MONEY TO BUY MORE.: NEVER TRUE

## 2024-10-11 SDOH — SOCIAL STABILITY: SOCIAL INSECURITY
WITHIN THE LAST YEAR, HAVE YOU BEEN KICKED, HIT, SLAPPED, OR OTHERWISE PHYSICALLY HURT BY YOUR PARTNER OR EX-PARTNER?: NO

## 2024-10-11 SDOH — SOCIAL STABILITY: SOCIAL INSECURITY: DO YOU FEEL UNSAFE GOING BACK TO THE PLACE WHERE YOU ARE LIVING?: NO

## 2024-10-11 SDOH — SOCIAL STABILITY: SOCIAL INSECURITY: ARE YOU OR HAVE YOU BEEN THREATENED OR ABUSED PHYSICALLY, EMOTIONALLY, OR SEXUALLY BY ANYONE?: NO

## 2024-10-11 SDOH — SOCIAL STABILITY: SOCIAL INSECURITY: DOES ANYONE TRY TO KEEP YOU FROM HAVING/CONTACTING OTHER FRIENDS OR DOING THINGS OUTSIDE YOUR HOME?: NO

## 2024-10-11 SDOH — ECONOMIC STABILITY: FOOD INSECURITY: HOW HARD IS IT FOR YOU TO PAY FOR THE VERY BASICS LIKE FOOD, HOUSING, MEDICAL CARE, AND HEATING?: NOT HARD AT ALL

## 2024-10-11 SDOH — SOCIAL STABILITY: SOCIAL INSECURITY: WERE YOU ABLE TO COMPLETE ALL THE BEHAVIORAL HEALTH SCREENINGS?: YES

## 2024-10-11 SDOH — SOCIAL STABILITY: SOCIAL INSECURITY: ABUSE: ADULT

## 2024-10-11 SDOH — SOCIAL STABILITY: SOCIAL INSECURITY: HAVE YOU HAD THOUGHTS OF HARMING ANYONE ELSE?: NO

## 2024-10-11 SDOH — SOCIAL STABILITY: SOCIAL INSECURITY: ARE THERE ANY APPARENT SIGNS OF INJURIES/BEHAVIORS THAT COULD BE RELATED TO ABUSE/NEGLECT?: NO

## 2024-10-11 ASSESSMENT — COGNITIVE AND FUNCTIONAL STATUS - GENERAL
HELP NEEDED FOR BATHING: A LITTLE
TURNING FROM BACK TO SIDE WHILE IN FLAT BAD: A LITTLE
DRESSING REGULAR UPPER BODY CLOTHING: A LITTLE
WALKING IN HOSPITAL ROOM: A LITTLE
MOVING TO AND FROM BED TO CHAIR: A LITTLE
TOILETING: A LITTLE
DAILY ACTIVITIY SCORE: 20
PATIENT BASELINE BEDBOUND: NO
MOBILITY SCORE: 18
MOVING FROM LYING ON BACK TO SITTING ON SIDE OF FLAT BED WITH BEDRAILS: A LITTLE
STANDING UP FROM CHAIR USING ARMS: A LITTLE
DRESSING REGULAR LOWER BODY CLOTHING: A LITTLE
CLIMB 3 TO 5 STEPS WITH RAILING: A LITTLE

## 2024-10-11 ASSESSMENT — ACTIVITIES OF DAILY LIVING (ADL)
ADEQUATE_TO_COMPLETE_ADL: YES
HEARING - RIGHT EAR: FUNCTIONAL
TOILETING: NEEDS ASSISTANCE
BATHING: INDEPENDENT
DRESSING YOURSELF: INDEPENDENT
GROOMING: INDEPENDENT
FEEDING YOURSELF: INDEPENDENT
JUDGMENT_ADEQUATE_SAFELY_COMPLETE_DAILY_ACTIVITIES: YES
HEARING - LEFT EAR: FUNCTIONAL
WALKS IN HOME: INDEPENDENT
LACK_OF_TRANSPORTATION: NO
PATIENT'S MEMORY ADEQUATE TO SAFELY COMPLETE DAILY ACTIVITIES?: YES

## 2024-10-11 ASSESSMENT — COLUMBIA-SUICIDE SEVERITY RATING SCALE - C-SSRS
6. HAVE YOU EVER DONE ANYTHING, STARTED TO DO ANYTHING, OR PREPARED TO DO ANYTHING TO END YOUR LIFE?: NO
2. HAVE YOU ACTUALLY HAD ANY THOUGHTS OF KILLING YOURSELF?: NO
1. IN THE PAST MONTH, HAVE YOU WISHED YOU WERE DEAD OR WISHED YOU COULD GO TO SLEEP AND NOT WAKE UP?: NO

## 2024-10-11 ASSESSMENT — LIFESTYLE VARIABLES
SKIP TO QUESTIONS 9-10: 1
AUDIT-C TOTAL SCORE: 1
HOW OFTEN DO YOU HAVE 6 OR MORE DRINKS ON ONE OCCASION: NEVER
PRESCIPTION_ABUSE_PAST_12_MONTHS: NO
SUBSTANCE_ABUSE_PAST_12_MONTHS: NO
HOW OFTEN DO YOU HAVE A DRINK CONTAINING ALCOHOL: MONTHLY OR LESS
AUDIT-C TOTAL SCORE: 1
HOW MANY STANDARD DRINKS CONTAINING ALCOHOL DO YOU HAVE ON A TYPICAL DAY: 1 OR 2

## 2024-10-11 ASSESSMENT — PAIN DESCRIPTION - LOCATION
LOCATION: FOOT
LOCATION: FOOT
LOCATION: ANKLE

## 2024-10-11 ASSESSMENT — PAIN SCALES - GENERAL
PAINLEVEL_OUTOF10: 6
PAINLEVEL_OUTOF10: 3
PAINLEVEL_OUTOF10: 7
PAINLEVEL_OUTOF10: 8
PAINLEVEL_OUTOF10: 2
PAIN_LEVEL: 2
PAINLEVEL_OUTOF10: 6
PAINLEVEL_OUTOF10: 6
PAINLEVEL_OUTOF10: 5 - MODERATE PAIN
PAINLEVEL_OUTOF10: 8
PAINLEVEL_OUTOF10: 6
PAINLEVEL_OUTOF10: 4
PAINLEVEL_OUTOF10: 8
PAINLEVEL_OUTOF10: 6
PAINLEVEL_OUTOF10: 9
PAINLEVEL_OUTOF10: 6
PAINLEVEL_OUTOF10: 8

## 2024-10-11 ASSESSMENT — PATIENT HEALTH QUESTIONNAIRE - PHQ9
2. FEELING DOWN, DEPRESSED OR HOPELESS: NOT AT ALL
SUM OF ALL RESPONSES TO PHQ9 QUESTIONS 1 & 2: 0
1. LITTLE INTEREST OR PLEASURE IN DOING THINGS: NOT AT ALL

## 2024-10-11 ASSESSMENT — PAIN DESCRIPTION - ORIENTATION
ORIENTATION: RIGHT

## 2024-10-11 ASSESSMENT — PAIN DESCRIPTION - DESCRIPTORS
DESCRIPTORS: ACHING
DESCRIPTORS: ACHING;CRAMPING
DESCRIPTORS: ACHING;CRAMPING

## 2024-10-11 NOTE — ANESTHESIA PREPROCEDURE EVALUATION
Patient: Stephanie Alatorre    Procedure Information       Date/Time: 10/11/24 0900    Procedures:       RIGHT ANKLE OPEN REDUCTION INTERNAL FIXATION REVISION WITH SYDESMOTIC FIXATION (Right: Ankle)      & C-ARM  **ARTHREX HOUSE TRAY** **ARTHREX TIGHTROPE IN CABINET** (Right: Ankle) - RIGHT ANKLE ORIF REVISION WITH SYDESMOTIC FIXATION EXTENDED RECOVERY  [facility] [phone]    Location: [facility] OR 02 / Virtual [facility] OR    Surgeons: Taran Wyman MD            Relevant Problems   Anesthesia (within normal limits)      Cardiac   (+) Essential hypertension, benign   (+) Heart murmur      /Renal   (+) Hyponatremia      Endocrine   (+) Morbid obesity (Multi)      Hematology   (+) Anemia   (+) Iron deficiency anemia, unspecified      ID   (+) COVID-19       Clinical information reviewed:    Allergies  Meds     OB Status           NPO Detail:  NPO/Void Status  Date of Last Liquid: 10/11/24  Time of Last Liquid: 0430  Date of Last Solid: 10/10/24  Time of Last Solid: 2230  Last Intake Type: Clear fluids         Physical Exam    Airway  Mallampati: I  TM distance: >3 FB  Neck ROM: full     Cardiovascular - normal exam     Dental - normal exam     Pulmonary - normal exam     Abdominal   (+) obese             Anesthesia Plan    History of general anesthesia?: yes  History of complications of general anesthesia?: no    ASA 3     general and regional     The patient is not a current smoker.    intravenous induction   Postoperative administration of opioids is intended.  Trial extubation is planned.  Anesthetic plan and risks discussed with patient.    Plan discussed with attending.

## 2024-10-11 NOTE — PROGRESS NOTES
Physical Therapy                 Therapy Communication Note    Patient Name: Stephanie Alatorre  MRN: [id number]  Department: [facility]  Room: 207/207-A  Today's Date: 10/11/2024     Discipline: Physical Therapy    Missed Visit Reason:  (attempted; per RN, patient in a lot of pain, just received pain meds. when attempted, patient tearful, politely declining, reporting she is in a lot of pain and cannot do any therapy at this time. repositioned patient with additional pillows for comfort)

## 2024-10-11 NOTE — OP NOTE
PRE OP DIAGNOSIS - SYNDESMOSIS DISRUPTION RT ANKLE    POST OP DIAGNOSIS- SAME    PROCEDURE - SYNDESMOSIS FIXATION RT ANKLE    SURGEON- ALISHA HERNÁNDEZ MD    ASSIST-   SIMRAN BRISENO    BLOOD LOSS  NONE    FINDINGS- FX     TOURNIQUET- NONE    DRAIN- NONE    PT WAS TAKEN TO THE OPERATING ROOM AND PLACED SUPINE.  SHE WAS PREPPED AND DRAPED IN A STERILE MANNER.      UNDER C-ARM THE ANKLE WAS EXAMINED AND I FELT THERE WAS SOME SYNDESMOSIS INSTABILITY AND I FIXED THE SYNDESMOSIS WITH ONE TIGHTROPE ANCHOR IN NEUTRAL DORSIFLEXION    WOUND WAS IRRIGATED.  AND CLOSED WITH A 2-0 VICRYL SUBCUTANEOUS STITCH AND 3-0 NYLON VERTICAL MATTRESS FOR THE SKIN.  A DRY STERILE DRESSING WAS APPLIED.  SHE WAS PLACED IN A CAST BOOT AND RETURNED TO THE RECOVERY ROOM IN SATISFACTORY CONDITION.    THE EXCELLENT ASSISTANCE OF ARELY PA WAS NECESSARY FOR SUCCESSFUL OUTCOME OF THIS SURGERY INCLUDING PREP, EXPOSURE, RETRACTING AND CLOSURE    THIS WAS DICTATED BY ALISHA HERNÁNDEZ MD

## 2024-10-11 NOTE — ANESTHESIA POSTPROCEDURE EVALUATION
Patient: Stephanie Alatorre    Procedure Summary       Date: 10/11/24 Room / Location: [facility] OR 02 / Virtual [facility] OR    Anesthesia Start: 0913 Anesthesia Stop:     Procedures:       RIGHT ANKLE SYDESMOTIC FIXATION (Right: Ankle)      & C-ARM  **ARTHREX HOUSE TRAY** **ARTHREX TIGHTROPE IN CABINET** (Right: Ankle) Diagnosis:       Other mechanical complication of internal fixation device of bone of right lower leg, initial encounter ([facility])      Sprain of tibiofibular ligament of right ankle, initial encounter      Closed displaced fracture of lateral malleolus of right fibula with routine healing      (OTHER MECHANICAL COMPLICATION OF INTERNAL FIXATION, SPRAIN OF TIBIOFIBULAR LIGAMENT OF RIGHT ANKLE, RIGHT ANKLE DISTAL FIBULA LATERAL MALLEOLUS FRACTURE)    Surgeons: Taran Wyman MD Responsible Provider: Sara White MD    Anesthesia Type: general, regional ASA Status: 3            Anesthesia Type: general, regional    Vitals Value Taken Time   /68 10/11/24 1027   Temp 36.0 10/11/24 1027   Pulse 74 10/11/24 1027   Resp 16 10/11/24 1027   SpO2 100 10/11/24 1027       Anesthesia Post Evaluation    Patient location during evaluation: PACU  Patient participation: complete - patient participated  Level of consciousness: awake and alert  Pain score: 2  Pain management: adequate  Airway patency: patent  Cardiovascular status: acceptable and hemodynamically stable  Respiratory status: acceptable, spontaneous ventilation and nasal cannula  Hydration status: acceptable  Postoperative Nausea and Vomiting: none        There were no known notable events for this encounter.

## 2024-10-11 NOTE — PROGRESS NOTES
Occupational Therapy                 Therapy Communication Note    Patient Name: Stephanie Alatorre  MRN: [id number]  Department: [facility] 2  Room: 207/207-A  Today's Date: 10/11/2024     Discipline: Occupational Therapy    Missed Visit Reason: Patient refused (attempted; per RN, patient in a lot of pain, just received pain meds. when attempted, patient tearful, politely declining, reporting she is in a lot of pain and cannot do any therapy at this time. repositioned patient with additional pillows for comfort. RN notified.     Missed Time: Attempt

## 2024-10-11 NOTE — ANESTHESIA PROCEDURE NOTES
Airway  Date/Time: 10/11/2024 9:26 AM  Urgency: elective    Airway not difficult    Staffing  Performed: attending   Authorized by: Sara White MD    Performed by: Sara White MD  Patient location during procedure: OR    Indications and Patient Condition  Indications for airway management: anesthesia  Spontaneous Ventilation: absent  Sedation level: deep  Preoxygenated: yes  Patient position: sniffing  MILS maintained throughout  Mask difficulty assessment: 1 - vent by mask  Planned trial extubation    Final Airway Details  Final airway type: endotracheal airway      Successful airway: ETT  Cuffed: yes   Successful intubation technique: video laryngoscopy  Facilitating devices/methods: intubating stylet  Endotracheal tube insertion site: oral  Blade: Karen  Blade size: #4  ETT size (mm): 7.5  Cormack-Lehane Classification: grade I - full view of glottis  Placement verified by: chest auscultation and capnometry   Cuff volume (mL): 10  Measured from: teeth  ETT to teeth (cm): 21

## 2024-10-11 NOTE — CARE PLAN
Problem: Pain - Adult  Goal: Verbalizes/displays adequate comfort level or baseline comfort level  Outcome: Progressing  Flowsheets (Taken 10/11/2024 1320)  Verbalizes/displays adequate comfort level or baseline comfort level: Assess pain using appropriate pain scale

## 2024-10-12 LAB
ANION GAP SERPL CALC-SCNC: 13 MMOL/L (ref 10–20)
BUN SERPL-MCNC: 10 MG/DL (ref 6–23)
CALCIUM SERPL-MCNC: 8.2 MG/DL (ref 8.6–10.3)
CHLORIDE SERPL-SCNC: 106 MMOL/L (ref 98–107)
CO2 SERPL-SCNC: 25 MMOL/L (ref 21–32)
CREAT SERPL-MCNC: 0.48 MG/DL (ref 0.5–1.05)
EGFRCR SERPLBLD CKD-EPI 2021: >90 ML/MIN/1.73M*2
ERYTHROCYTE [DISTWIDTH] IN BLOOD BY AUTOMATED COUNT: 13.6 % (ref 11.5–14.5)
GLUCOSE SERPL-MCNC: 103 MG/DL (ref 74–99)
HCT VFR BLD AUTO: 35.5 % (ref 36–46)
HGB BLD-MCNC: 11.8 G/DL (ref 12–16)
MCH RBC QN AUTO: 31.9 PG (ref 26–34)
MCHC RBC AUTO-ENTMCNC: 33.2 G/DL (ref 32–36)
MCV RBC AUTO: 96 FL (ref 80–100)
NRBC BLD-RTO: 0 /100 WBCS (ref 0–0)
PLATELET # BLD AUTO: 212 X10*3/UL (ref 150–450)
POTASSIUM SERPL-SCNC: 4.2 MMOL/L (ref 3.5–5.3)
RBC # BLD AUTO: 3.7 X10*6/UL (ref 4–5.2)
SODIUM SERPL-SCNC: 140 MMOL/L (ref 136–145)
WBC # BLD AUTO: 7 X10*3/UL (ref 4.4–11.3)

## 2024-10-12 PROCEDURE — 80048 BASIC METABOLIC PNL TOTAL CA: CPT | Performed by: ORTHOPAEDIC SURGERY

## 2024-10-12 PROCEDURE — 2500000004 HC RX 250 GENERAL PHARMACY W/ HCPCS (ALT 636 FOR OP/ED): Performed by: ORTHOPAEDIC SURGERY

## 2024-10-12 PROCEDURE — 2500000001 HC RX 250 WO HCPCS SELF ADMINISTERED DRUGS (ALT 637 FOR MEDICARE OP): Performed by: ORTHOPAEDIC SURGERY

## 2024-10-12 PROCEDURE — 97535 SELF CARE MNGMENT TRAINING: CPT | Mod: GO

## 2024-10-12 PROCEDURE — 36415 COLL VENOUS BLD VENIPUNCTURE: CPT | Performed by: ORTHOPAEDIC SURGERY

## 2024-10-12 PROCEDURE — 97530 THERAPEUTIC ACTIVITIES: CPT | Mod: GP

## 2024-10-12 PROCEDURE — 97162 PT EVAL MOD COMPLEX 30 MIN: CPT | Mod: GP

## 2024-10-12 PROCEDURE — 97165 OT EVAL LOW COMPLEX 30 MIN: CPT | Mod: GO

## 2024-10-12 PROCEDURE — 85027 COMPLETE CBC AUTOMATED: CPT | Performed by: ORTHOPAEDIC SURGERY

## 2024-10-12 PROCEDURE — 2500000004 HC RX 250 GENERAL PHARMACY W/ HCPCS (ALT 636 FOR OP/ED): Performed by: CLINICAL NURSE SPECIALIST

## 2024-10-12 PROCEDURE — 2500000001 HC RX 250 WO HCPCS SELF ADMINISTERED DRUGS (ALT 637 FOR MEDICARE OP): Performed by: CLINICAL NURSE SPECIALIST

## 2024-10-12 PROCEDURE — 1100000001 HC PRIVATE ROOM DAILY

## 2024-10-12 ASSESSMENT — COGNITIVE AND FUNCTIONAL STATUS - GENERAL
HELP NEEDED FOR BATHING: A LOT
DRESSING REGULAR LOWER BODY CLOTHING: A LOT
TOILETING: A LITTLE
CLIMB 3 TO 5 STEPS WITH RAILING: A LITTLE
DRESSING REGULAR UPPER BODY CLOTHING: A LITTLE
DAILY ACTIVITIY SCORE: 17
HELP NEEDED FOR BATHING: A LITTLE
DAILY ACTIVITIY SCORE: 20
WALKING IN HOSPITAL ROOM: A LITTLE
MOBILITY SCORE: 18
MOVING TO AND FROM BED TO CHAIR: A LITTLE
TOILETING: A LOT
MOVING TO AND FROM BED TO CHAIR: A LITTLE
DRESSING REGULAR LOWER BODY CLOTHING: A LITTLE
DRESSING REGULAR UPPER BODY CLOTHING: A LITTLE
DAILY ACTIVITIY SCORE: 20
STANDING UP FROM CHAIR USING ARMS: A LITTLE
TURNING FROM BACK TO SIDE WHILE IN FLAT BAD: A LOT
HELP NEEDED FOR BATHING: A LITTLE
TURNING FROM BACK TO SIDE WHILE IN FLAT BAD: A LITTLE
STANDING UP FROM CHAIR USING ARMS: A LITTLE
CLIMB 3 TO 5 STEPS WITH RAILING: A LITTLE
MOBILITY SCORE: 13
STANDING UP FROM CHAIR USING ARMS: A LITTLE
WALKING IN HOSPITAL ROOM: A LOT
MOVING FROM LYING ON BACK TO SITTING ON SIDE OF FLAT BED WITH BEDRAILS: A LITTLE
TOILETING: A LITTLE
DRESSING REGULAR LOWER BODY CLOTHING: A LITTLE
DRESSING REGULAR UPPER BODY CLOTHING: A LITTLE
MOVING TO AND FROM BED TO CHAIR: A LITTLE
WALKING IN HOSPITAL ROOM: A LITTLE
TURNING FROM BACK TO SIDE WHILE IN FLAT BAD: A LITTLE
MOBILITY SCORE: 18
CLIMB 3 TO 5 STEPS WITH RAILING: TOTAL
MOVING FROM LYING ON BACK TO SITTING ON SIDE OF FLAT BED WITH BEDRAILS: A LOT
MOVING FROM LYING ON BACK TO SITTING ON SIDE OF FLAT BED WITH BEDRAILS: A LITTLE

## 2024-10-12 ASSESSMENT — PAIN - FUNCTIONAL ASSESSMENT

## 2024-10-12 ASSESSMENT — PAIN DESCRIPTION - DESCRIPTORS
DESCRIPTORS: THROBBING
DESCRIPTORS: ACHING
DESCRIPTORS: ACHING;THROBBING
DESCRIPTORS: ACHING

## 2024-10-12 ASSESSMENT — ACTIVITIES OF DAILY LIVING (ADL)
EFFECT OF PAIN ON DAILY ACTIVITIES: DECREASED AMBULATION
BATHING_ASSISTANCE: MODERATE
ADLS_ADDRESSED: YES
HOME_MANAGEMENT_TIME_ENTRY: 10

## 2024-10-12 ASSESSMENT — PAIN SCALES - GENERAL
PAINLEVEL_OUTOF10: 5 - MODERATE PAIN
PAINLEVEL_OUTOF10: 9
PAINLEVEL_OUTOF10: 7
PAINLEVEL_OUTOF10: 2
PAINLEVEL_OUTOF10: 8
PAINLEVEL_OUTOF10: 7
PAINLEVEL_OUTOF10: 8
PAINLEVEL_OUTOF10: 0 - NO PAIN
PAINLEVEL_OUTOF10: 9
PAINLEVEL_OUTOF10: 7
PAINLEVEL_OUTOF10: 10 - WORST POSSIBLE PAIN
PAINLEVEL_OUTOF10: 8
PAINLEVEL_OUTOF10: 5 - MODERATE PAIN
PAINLEVEL_OUTOF10: 7
PAINLEVEL_OUTOF10: 7
PAINLEVEL_OUTOF10: 2

## 2024-10-12 NOTE — PROGRESS NOTES
Occupational Therapy    Occupational Therapy    Evaluation    Patient Name: Stephanie Alatorre  MRN: [id number]  Today's Date: 10/12/2024  Time Calculation  Start Time: 1019 207/207-A    Assessment  IP OT Assessment  OT Assessment: impaired adls/iadls  Prognosis: Good  Medical Staff Made Aware: Yes  End of Session Communication: Bedside nurse  End of Session Patient Position: Bed, 4 rail up, Alarm off, not on at start of session (call light in reach. all needs met)    Plan:  Treatment Interventions: ADL retraining, Functional transfer training, Endurance training, Patient/family training, Equipment evaluation/education, Compensatory technique education  OT Frequency: 3 times per week  OT Discharge Recommendations: Moderate intensity level of continued care  OT - OK to Discharge: Yes (once cleared by medical team)    Subjective     Current Problem:  1. Ankle syndesmosis disruption, right, initial encounter        2. Syndesmotic disruption of right ankle, subsequent encounter  Insert and maintain peripheral IV     General:  General  Reason for Referral: OT eval and treat for adls- Pt. admitted with Rt. ankle syndesmosis disruption after rt. ankle ORIF on 9/15/24. Pt. underwent Rt.ankle syndesmotic fixation wtih tight rope anchor in neutral dorsiflexion on 10/11/24.  Referred By: Dr. Taran Wyman  Past Medical History Relevant to Rehab: gerd, htn, hld, morbid obesity, gastric bypass  Family/Caregiver Present: No  Co-Treatment: PT  Co-Treatment Reason: to maximize pt. safety and mobility  Prior to Session Communication: Bedside nurse  Patient Position Received: Bed, 4 rail up, Alarm off, not on at start of session    Precautions:  LE Weight Bearing Status: Weight Bearing as Tolerated (RLE)  Medical Precautions: Fall precautions  Precautions Comment: Rt. boot on while OOB protect heels    Pain:  Pain Assessment  Pain Assessment: 0-10  0-10 (Numeric) Pain Score: 7  Pain Location: Ankle  Pain Orientation: Right  Pain Radiating  Towards: radiating  Pain Descriptors: Aching, Throbbing  Pain Interventions: Repositioned, Distraction, Emotional support    Objective     Cognition:  Overall Cognitive Status: Within Functional Limits  Orientation Level: Oriented X4     Home Living:  Type of Home: House  Lives With:  (pt.'s adult special needs dtr. lives with her.)  Home Adaptive Equipment: Wheelchair-manual  Home Layout: One level  Home Access: Stairs to enter with rails (3)  Bathroom Shower/Tub: Tub/shower unit (transfer bench, glass doors on shower)  Bathroom Toilet: Handicapped height (grab bar)  Home Living Comments: pt. has been at skilled facility  since ankle fx/sx on 9/15/24 for therapy and will most likely return there and resume therapy     Prior Function:  Hand Dominance: Right  Prior Function Comments: Prior to ankle fx, pt. was completely ind. with all adls/iadls/driving/working. At [facility], pt. reports ind. transferring from bed to WC, ind. propelling WC to bathroom, ind. with grooming at sink, prn assist with toileting, prn assist with sponge bathing/dressing, ind. with eating. Pt. had been [facility] to [facility], so non-ambulatory.    IADL History:  Homemaking Responsibilities: Yes  IADL Comments: currently at SNF, so N/A    ADL:  Eating Assistance: Independent  Grooming Assistance: Independent  Bathing Assistance: Moderate  UE Dressing Assistance: Independent  LE Dressing Assistance: Maximal  Toileting Assistance with Device: Maximal  ADL Comments: OT TREATMENT: Pt. propelled WC with sba and cueing/assist with door. Pt. ind. brushing teeth, hair, washing upper body at sink level. Pt. ind. doffing/doffing large hospital gown while seated in WC. Pt. min. assist of 2 stand-pivot transfer from WC back into bed. Pt. utilized headboard handrails to pull self up in bed ind.'ly.    Bed Mobility/Transfers:   Bed Mobility  Bed Mobility:  (mod. assist of 1 supine to sit to eob with cues. mod. assist of 2 sit to supine)  Transfers  Transfer:  (sit to stand  from bed with cga. stand-pivot transfer from WC to bed with min. assist of 2.)    Ambulation/Gait Training:  Functional Mobility  Functional Mobility Performed:  (pt. ambulated a few steps from eob to WC with min. assist of 2 with increased effort)    Sitting Balance:  Static Sitting Balance  Static Sitting-Level of Assistance: Distant supervision  Dynamic Sitting Balance  Dynamic Sitting-Level of Assistance: Close supervision    Sensation:  Light Touch: No apparent deficits    Strength:  Strength Comments: bue arom wfl. bue strength 4/5    Coordination:  Coordination Comment: bue fine/gross motor coordination wfl     Hand Function:  Hand Function  Coordination: Functional    Outcome Measures: [facility] Daily Activity  Putting on and taking off regular lower body clothing: A lot  Bathing (including washing, rinsing, drying): A lot  Putting on and taking off regular upper body clothing: A little  Toileting, which includes using toilet, bedpan or urinal: A lot  Taking care of personal grooming such as brushing teeth: None  Eating Meals: None  Daily Activity - Total Score: 17     EDUCATION:       Goals:   Encounter Problems       Encounter Problems (Active)       OT Goals       OT Goal 1 (Progressing)       Start:  10/12/24    Expected End:  10/26/24       Pt. will perform lower body bathing and dressing with sba using adaptive equipment as needed.           OT Goal 2 (Progressing)       Start:  10/12/24    Expected End:  10/26/24       Pt. will perform bed mobility/chair/commode transfers with sba.           OT Goal 3 (Progressing)       Start:  10/12/24    Expected End:  10/26/24       Pt.  will perform BUE therapeutic exercises x 10 min. as tolerated to improve endurance for functional transfers/self-care tasks.           OT Goal 4 (Progressing)       Start:  10/12/24    Expected End:  10/26/24       Pt. will tolerate 6-8 min. of standing tasks with sba in preparation for grooming at sink.           OT Goal 5  (Progressing)       Start:  10/12/24    Expected End:  10/26/24       Pt. will perform toileting with sba using adaptive equipment as needed.
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
## 2024-10-12 NOTE — CARE PLAN
The patient's goals for the shift include Pain control    The clinical goals for the shift include HDS/pain mgnt      Problem: Pain - Adult  Goal: Verbalizes/displays adequate comfort level or baseline comfort level  Outcome: Progressing  Flowsheets (Taken 10/12/2024 0742)  Verbalizes/displays adequate comfort level or baseline comfort level: Administer analgesics based on type and severity of pain and evaluate response     Problem: Safety - Adult  Goal: Free from fall injury  Outcome: Progressing  Flowsheets (Taken 10/12/2024 0742)  Free from fall injury: Instruct family/caregiver on patient safety     Problem: Discharge Planning  Goal: Discharge to home or other facility with appropriate resources  Outcome: Progressing  Flowsheets (Taken 10/12/2024 0742)  Discharge to home or other facility with appropriate resources: Arrange for needed discharge resources and transportation as appropriate     Problem: Chronic Conditions and Co-morbidities  Goal: Patient's chronic conditions and co-morbidity symptoms are monitored and maintained or improved  Outcome: Progressing  Flowsheets (Taken 10/12/2024 0742)  Care Plan - Patient's Chronic Conditions and Co-Morbidity Symptoms are Monitored and Maintained or Improved: Monitor and assess patient's chronic conditions and comorbid symptoms for stability, deterioration, or improvement     Problem: Pain  Goal: Takes deep breaths with improved pain control throughout the shift  Outcome: Progressing  Goal: Turns in bed with improved pain control throughout the shift  Outcome: Progressing  Goal: Walks with improved pain control throughout the shift  Outcome: Progressing  Goal: Performs ADL's with improved pain control throughout shift  Outcome: Progressing  Goal: Participates in PT with improved pain control throughout the shift  Outcome: Progressing  Goal: Free from opioid side effects throughout the shift  Outcome: Progressing  Goal: Free from acute confusion related to pain meds  throughout the shift  Outcome: Progressing     Problem: Skin  Goal: Decreased wound size/increased tissue granulation at next dressing change  Outcome: Progressing  Flowsheets (Taken 10/12/2024 0742)  Decreased wound size/increased tissue granulation at next dressing change: Promote sleep for wound healing  Goal: Participates in plan/prevention/treatment measures  Outcome: Progressing  Flowsheets (Taken 10/12/2024 0742)  Participates in plan/prevention/treatment measures: Discuss with provider PT/OT consult  Goal: Prevent/manage excess moisture  Outcome: Progressing  Flowsheets (Taken 10/12/2024 0742)  Prevent/manage excess moisture: Moisturize dry skin  Goal: Prevent/minimize sheer/friction injuries  Outcome: Progressing  Flowsheets (Taken 10/12/2024 0742)  Prevent/minimize sheer/friction injuries: HOB 30 degrees or less  Goal: Promote/optimize nutrition  Outcome: Progressing  Flowsheets (Taken 10/12/2024 0742)  Promote/optimize nutrition: Discuss with provider if NPO > 2 days  Goal: Promote skin healing  Outcome: Progressing  Flowsheets (Taken 10/12/2024 0742)  Promote skin healing: Protective dressings over bony prominences

## 2024-10-12 NOTE — CARE PLAN
The patient's goals for the shift include Pain control    The clinical goals for the shift include Pain control      Problem: Pain - Adult  Goal: Verbalizes/displays adequate comfort level or baseline comfort level  Outcome: Progressing     Problem: Safety - Adult  Goal: Free from fall injury  Outcome: Progressing     Problem: Discharge Planning  Goal: Discharge to home or other facility with appropriate resources  Outcome: Progressing     Problem: Chronic Conditions and Co-morbidities  Goal: Patient's chronic conditions and co-morbidity symptoms are monitored and maintained or improved  Outcome: Progressing     Problem: Pain  Goal: Takes deep breaths with improved pain control throughout the shift  Outcome: Progressing  Goal: Turns in bed with improved pain control throughout the shift  Outcome: Progressing  Goal: Walks with improved pain control throughout the shift  Outcome: Progressing  Goal: Performs ADL's with improved pain control throughout shift  Outcome: Progressing  Goal: Participates in PT with improved pain control throughout the shift  Outcome: Progressing  Goal: Free from opioid side effects throughout the shift  Outcome: Progressing  Goal: Free from acute confusion related to pain meds throughout the shift  Outcome: Progressing     Problem: Skin  Goal: Decreased wound size/increased tissue granulation at next dressing change  Outcome: Progressing  Goal: Participates in plan/prevention/treatment measures  Outcome: Progressing  Goal: Prevent/manage excess moisture  Outcome: Progressing  Goal: Prevent/minimize sheer/friction injuries  Outcome: Progressing  Goal: Promote/optimize nutrition  Outcome: Progressing  Goal: Promote skin healing  Outcome: Progressing

## 2024-10-13 LAB
ANION GAP SERPL CALC-SCNC: 13 MMOL/L (ref 10–20)
BUN SERPL-MCNC: 8 MG/DL (ref 6–23)
CALCIUM SERPL-MCNC: 8.6 MG/DL (ref 8.6–10.3)
CHLORIDE SERPL-SCNC: 105 MMOL/L (ref 98–107)
CO2 SERPL-SCNC: 25 MMOL/L (ref 21–32)
CREAT SERPL-MCNC: 0.41 MG/DL (ref 0.5–1.05)
EGFRCR SERPLBLD CKD-EPI 2021: >90 ML/MIN/1.73M*2
ERYTHROCYTE [DISTWIDTH] IN BLOOD BY AUTOMATED COUNT: 13.6 % (ref 11.5–14.5)
GLUCOSE SERPL-MCNC: 92 MG/DL (ref 74–99)
HCT VFR BLD AUTO: 36.8 % (ref 36–46)
HGB BLD-MCNC: 12.2 G/DL (ref 12–16)
MCH RBC QN AUTO: 31.4 PG (ref 26–34)
MCHC RBC AUTO-ENTMCNC: 33.2 G/DL (ref 32–36)
MCV RBC AUTO: 95 FL (ref 80–100)
NRBC BLD-RTO: 0 /100 WBCS (ref 0–0)
PLATELET # BLD AUTO: 210 X10*3/UL (ref 150–450)
POTASSIUM SERPL-SCNC: 4.1 MMOL/L (ref 3.5–5.3)
RBC # BLD AUTO: 3.88 X10*6/UL (ref 4–5.2)
SODIUM SERPL-SCNC: 139 MMOL/L (ref 136–145)
WBC # BLD AUTO: 6.7 X10*3/UL (ref 4.4–11.3)

## 2024-10-13 PROCEDURE — 1100000001 HC PRIVATE ROOM DAILY

## 2024-10-13 PROCEDURE — 2500000001 HC RX 250 WO HCPCS SELF ADMINISTERED DRUGS (ALT 637 FOR MEDICARE OP): Performed by: CLINICAL NURSE SPECIALIST

## 2024-10-13 PROCEDURE — 36415 COLL VENOUS BLD VENIPUNCTURE: CPT | Performed by: ORTHOPAEDIC SURGERY

## 2024-10-13 PROCEDURE — 2500000001 HC RX 250 WO HCPCS SELF ADMINISTERED DRUGS (ALT 637 FOR MEDICARE OP): Performed by: ORTHOPAEDIC SURGERY

## 2024-10-13 PROCEDURE — 85027 COMPLETE CBC AUTOMATED: CPT | Performed by: ORTHOPAEDIC SURGERY

## 2024-10-13 PROCEDURE — 80048 BASIC METABOLIC PNL TOTAL CA: CPT | Performed by: ORTHOPAEDIC SURGERY

## 2024-10-13 PROCEDURE — 2500000004 HC RX 250 GENERAL PHARMACY W/ HCPCS (ALT 636 FOR OP/ED): Performed by: ORTHOPAEDIC SURGERY

## 2024-10-13 PROCEDURE — 2500000002 HC RX 250 W HCPCS SELF ADMINISTERED DRUGS (ALT 637 FOR MEDICARE OP, ALT 636 FOR OP/ED): Performed by: ORTHOPAEDIC SURGERY

## 2024-10-13 PROCEDURE — 97530 THERAPEUTIC ACTIVITIES: CPT | Mod: GP

## 2024-10-13 PROCEDURE — 2500000004 HC RX 250 GENERAL PHARMACY W/ HCPCS (ALT 636 FOR OP/ED): Performed by: CLINICAL NURSE SPECIALIST

## 2024-10-13 PROCEDURE — 97535 SELF CARE MNGMENT TRAINING: CPT | Mod: GO

## 2024-10-13 ASSESSMENT — COGNITIVE AND FUNCTIONAL STATUS - GENERAL
MOVING FROM LYING ON BACK TO SITTING ON SIDE OF FLAT BED WITH BEDRAILS: A LITTLE
HELP NEEDED FOR BATHING: A LITTLE
TOILETING: A LITTLE
MOVING FROM LYING ON BACK TO SITTING ON SIDE OF FLAT BED WITH BEDRAILS: A LOT
TOILETING: A LITTLE
DAILY ACTIVITIY SCORE: 20
HELP NEEDED FOR BATHING: A LITTLE
WALKING IN HOSPITAL ROOM: A LITTLE
MOVING FROM LYING ON BACK TO SITTING ON SIDE OF FLAT BED WITH BEDRAILS: A LITTLE
CLIMB 3 TO 5 STEPS WITH RAILING: TOTAL
CLIMB 3 TO 5 STEPS WITH RAILING: A LITTLE
TURNING FROM BACK TO SIDE WHILE IN FLAT BAD: A LOT
DAILY ACTIVITIY SCORE: 17
MOVING TO AND FROM BED TO CHAIR: A LITTLE
MOVING TO AND FROM BED TO CHAIR: A LITTLE
DRESSING REGULAR LOWER BODY CLOTHING: A LOT
CLIMB 3 TO 5 STEPS WITH RAILING: A LITTLE
DRESSING REGULAR LOWER BODY CLOTHING: A LITTLE
MOBILITY SCORE: 13
HELP NEEDED FOR BATHING: A LOT
STANDING UP FROM CHAIR USING ARMS: A LITTLE
DAILY ACTIVITIY SCORE: 20
MOVING TO AND FROM BED TO CHAIR: A LITTLE
STANDING UP FROM CHAIR USING ARMS: A LITTLE
DRESSING REGULAR UPPER BODY CLOTHING: A LITTLE
WALKING IN HOSPITAL ROOM: A LOT
TOILETING: TOTAL
TURNING FROM BACK TO SIDE WHILE IN FLAT BAD: A LITTLE
MOBILITY SCORE: 18
WALKING IN HOSPITAL ROOM: A LITTLE
MOBILITY SCORE: 18
DRESSING REGULAR UPPER BODY CLOTHING: A LITTLE
TURNING FROM BACK TO SIDE WHILE IN FLAT BAD: A LITTLE
STANDING UP FROM CHAIR USING ARMS: A LITTLE
DRESSING REGULAR LOWER BODY CLOTHING: A LITTLE

## 2024-10-13 ASSESSMENT — PAIN - FUNCTIONAL ASSESSMENT
PAIN_FUNCTIONAL_ASSESSMENT: 0-10

## 2024-10-13 ASSESSMENT — PAIN SCALES - GENERAL
PAINLEVEL_OUTOF10: 5 - MODERATE PAIN
PAINLEVEL_OUTOF10: 7
PAINLEVEL_OUTOF10: 5 - MODERATE PAIN
PAINLEVEL_OUTOF10: 0 - NO PAIN
PAINLEVEL_OUTOF10: 7
PAINLEVEL_OUTOF10: 0 - NO PAIN
PAINLEVEL_OUTOF10: 8

## 2024-10-13 ASSESSMENT — PAIN DESCRIPTION - DESCRIPTORS
DESCRIPTORS: ACHING
DESCRIPTORS: ACHING

## 2024-10-13 ASSESSMENT — PAIN SCALES - PAIN ASSESSMENT IN ADVANCED DEMENTIA (PAINAD): TOTALSCORE: MEDICATION (SEE MAR)

## 2024-10-13 ASSESSMENT — ACTIVITIES OF DAILY LIVING (ADL): HOME_MANAGEMENT_TIME_ENTRY: 15

## 2024-10-13 ASSESSMENT — PAIN DESCRIPTION - LOCATION: LOCATION: ANKLE

## 2024-10-13 ASSESSMENT — PAIN DESCRIPTION - ORIENTATION: ORIENTATION: RIGHT

## 2024-10-13 NOTE — PROGRESS NOTES
Occupational Therapy    OT Treatment    Patient Name: Stephanie Alatorre  MRN: [id number]  Department: [facility]  Room: [address] [place] [address]  Today's Date: 10/13/2024  Time Calculation  Start Time: 1352  Stop Time: 1419  Time Calculation (min): 27 min        Assessment:  Prognosis: Fair  End of Session Communication: Bedside nurse  End of Session Patient Position: Bed, 3 rail up, Alarm off, not on at start of session  OT Assessment Results: Decreased ADL status, Decreased endurance, Decreased functional mobility, Decreased IADLs  Prognosis: Fair  Plan:  Treatment Interventions: ADL retraining, Functional transfer training, Endurance training, Patient/family training, Equipment evaluation/education, Compensatory technique education  OT Frequency: 3 times per week  OT Discharge Recommendations: Moderate intensity level of continued care  OT - OK to Discharge: Yes (once cleared by medical team)  Treatment Interventions: ADL retraining, Functional transfer training, Endurance training, Patient/family training, Equipment evaluation/education, Compensatory technique education    Subjective   Previous Visit Info:  OT Last Visit  OT Received On: 10/13/24  General:  General  Co-Treatment: PT  Co-Treatment Reason: to facilitate safety and patient's activity tolerance  Prior to Session Communication: Bedside nurse  Patient Position Received: Bed, 3 rail up, Alarm off, not on at start of session  General Comment: requested to see patient per ortho to address d/c planning  Precautions:  Precautions Comment: (R) LE WBAT, CAM boot donned throughout    Pain:  Pain Assessment  Pain Assessment: 0-10  0-10 (Numeric) Pain Score:  (5/10 at rest, 7/10 when attempting WBing in standing to (R) LE; nursing notified and patient repositioned)    Objective    Activities of Daily Living:    Total assist for toileting 2/2 purwick in place     Bed Mobility/Transfers: Bed Mobility  Bed Mobility: Yes (MOD I with assist of draw sheet as modified leg  ")    Transfers  Transfer: Yes (completing sit <> stand from EOB with FWW assist x2 trials with MIN A however completing with NWBing to (R) LE as with severe anxiety about weight bearing through (R) LE)    Functional Mobility:  Functional Mobility  Functional Mobility Performed:  (patient declining to attempt, extensive time spent demonstrating weight bearing however when attempting patient reporting increased pain and unable to off load with UEs; side steps along EOB by \"shimmying\" on her (L) LE and NWBing to (R) LE)    Outcome Measures:[facility] Daily Activity  Putting on and taking off regular lower body clothing: A lot  Bathing (including washing, rinsing, drying): A lot  Putting on and taking off regular upper body clothing: None  Toileting, which includes using toilet, bedpan or urinal: Total  Taking care of personal grooming such as brushing teeth: None  Eating Meals: None  Daily Activity - Total Score: 17        Education Documentation  ADL Training, taught by Madhavi Joseph, OT at 10/13/2024  2:39 PM.  Learner: Patient  Readiness: Acceptance  Method: Explanation  Response: Verbalizes Understanding, Needs Reinforcement    Education Comments  No comments found.        OP EDUCATION:  Education  Individual(s) Educated: Patient  Education Comment: educated on use of draw sheet as leg  for bed mobility    Goals:  Encounter Problems       Encounter Problems (Active)       OT Goals       OT Goal 1 (Progressing)       Start:  10/12/24    Expected End:  10/26/24       Pt. will perform lower body bathing and dressing with sba using adaptive equipment as needed.           OT Goal 2 (Progressing)       Start:  10/12/24    Expected End:  10/26/24       Pt. will perform bed mobility/chair/commode transfers with sba.           OT Goal 3 (Progressing)       Start:  10/12/24    Expected End:  10/26/24       Pt.  will perform BUE therapeutic exercises x 10 min. as tolerated to improve endurance for functional " transfers/self-care tasks.           OT Goal 4 (Progressing)       Start:  10/12/24    Expected End:  10/26/24       Pt. will tolerate 6-8 min. of standing tasks with sba in preparation for grooming at sink.           OT Goal 5 (Progressing)       Start:  10/12/24    Expected End:  10/26/24       Pt. will perform toileting with sba using adaptive equipment as needed.
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
## 2024-10-13 NOTE — CARE PLAN
The patient's goals for the shift include Pain control    The clinical goals for the shift include pain management      Problem: Pain - Adult  Goal: Verbalizes/displays adequate comfort level or baseline comfort level  Outcome: Progressing  Flowsheets (Taken 10/13/2024 0714)  Verbalizes/displays adequate comfort level or baseline comfort level: Administer analgesics based on type and severity of pain and evaluate response     Problem: Safety - Adult  Goal: Free from fall injury  Outcome: Progressing  Flowsheets (Taken 10/13/2024 0714)  Free from fall injury: Instruct family/caregiver on patient safety     Problem: Discharge Planning  Goal: Discharge to home or other facility with appropriate resources  Outcome: Progressing  Flowsheets (Taken 10/13/2024 0714)  Discharge to home or other facility with appropriate resources: Identify discharge learning needs (meds, wound care, etc)     Problem: Chronic Conditions and Co-morbidities  Goal: Patient's chronic conditions and co-morbidity symptoms are monitored and maintained or improved  Outcome: Progressing  Flowsheets (Taken 10/13/2024 0714)  Care Plan - Patient's Chronic Conditions and Co-Morbidity Symptoms are Monitored and Maintained or Improved: Collaborate with multidisciplinary team to address chronic and comorbid conditions and prevent exacerbation or deterioration     Problem: Pain  Goal: Takes deep breaths with improved pain control throughout the shift  Outcome: Progressing  Goal: Turns in bed with improved pain control throughout the shift  Outcome: Progressing  Goal: Walks with improved pain control throughout the shift  Outcome: Progressing  Goal: Performs ADL's with improved pain control throughout shift  Outcome: Progressing  Goal: Participates in PT with improved pain control throughout the shift  Outcome: Progressing  Goal: Free from opioid side effects throughout the shift  Outcome: Progressing  Goal: Free from acute confusion related to pain meds  throughout the shift  Outcome: Progressing     Problem: Skin  Goal: Decreased wound size/increased tissue granulation at next dressing change  Outcome: Progressing  Flowsheets (Taken 10/13/2024 0714)  Decreased wound size/increased tissue granulation at next dressing change: Promote sleep for wound healing  Goal: Participates in plan/prevention/treatment measures  Outcome: Progressing  Flowsheets (Taken 10/13/2024 0714)  Participates in plan/prevention/treatment measures: Discuss with provider PT/OT consult  Goal: Prevent/manage excess moisture  Outcome: Progressing  Flowsheets (Taken 10/13/2024 0714)  Prevent/manage excess moisture: Monitor for/manage infection if present  Goal: Prevent/minimize sheer/friction injuries  Outcome: Progressing  Flowsheets (Taken 10/13/2024 0714)  Prevent/minimize sheer/friction injuries: Turn/reposition every 2 hours/use positioning/transfer devices  Goal: Promote/optimize nutrition  Outcome: Progressing  Flowsheets (Taken 10/13/2024 0714)  Promote/optimize nutrition: Offer water/supplements/favorite foods  Goal: Promote skin healing  Outcome: Progressing  Flowsheets (Taken 10/13/2024 0714)  Promote skin healing: Protective dressings over bony prominences

## 2024-10-13 NOTE — PROGRESS NOTES
10/13/24 1314   Discharge Planning   Living Arrangements Children   Type of Residence Private residence  (one story home)   Number of Stairs to Enter Residence 4   Home or Post Acute Services In home services   Type of Home Care Services Home OT;Home PT   Expected Discharge Disposition Home H   Does the patient need discharge transport arranged? Yes   RoundTrip coordination needed? Yes   Has discharge transport been arranged? No     Met with patient at bedside, introduced self and role on care transitions team. Admission assessment completed with patient. Address, insurance and contact information verified. Patient states prior to admission she was at [facility] for skilled nursing, patient states her insurance was set to stop covering skilled stay as of Friday but patient was admitted to the hospital. Patient lives at home with her  daughter, patient states her daughter has 24/7 caregivers coming in. Discussed with patient discharge plan. Patient states she has four steps to enter her home and state she would need transportation arranged for discharge, patient updated that there is a potential cost for transportation. Patient has a walker and wheelchair at home. Patient states her preference is to discharge home and feels she can manage her care at home. Discussed home health care. [place] of choice explained. Patient preference is [facility]. Requested for PT/OT to work with patient today. Bedside nurse updated.  PCP: Dr. Carlos A Omalley     Addendum 1356: Met with patient at bedside, patient states after discussing with her sister her preference would be to return to SNF [facility]. Requested for DSC to send return SNF referral.

## 2024-10-14 LAB
ANION GAP SERPL CALC-SCNC: 9 MMOL/L (ref 10–20)
BUN SERPL-MCNC: 11 MG/DL (ref 6–23)
CALCIUM SERPL-MCNC: 8.5 MG/DL (ref 8.6–10.3)
CHLORIDE SERPL-SCNC: 103 MMOL/L (ref 98–107)
CO2 SERPL-SCNC: 28 MMOL/L (ref 21–32)
CREAT SERPL-MCNC: 0.45 MG/DL (ref 0.5–1.05)
EGFRCR SERPLBLD CKD-EPI 2021: >90 ML/MIN/1.73M*2
ERYTHROCYTE [DISTWIDTH] IN BLOOD BY AUTOMATED COUNT: 13.3 % (ref 11.5–14.5)
GLUCOSE SERPL-MCNC: 87 MG/DL (ref 74–99)
HCT VFR BLD AUTO: 37 % (ref 36–46)
HGB BLD-MCNC: 12.4 G/DL (ref 12–16)
MCH RBC QN AUTO: 32 PG (ref 26–34)
MCHC RBC AUTO-ENTMCNC: 33.5 G/DL (ref 32–36)
MCV RBC AUTO: 95 FL (ref 80–100)
NRBC BLD-RTO: 0 /100 WBCS (ref 0–0)
PLATELET # BLD AUTO: 225 X10*3/UL (ref 150–450)
POTASSIUM SERPL-SCNC: 4.2 MMOL/L (ref 3.5–5.3)
RBC # BLD AUTO: 3.88 X10*6/UL (ref 4–5.2)
SODIUM SERPL-SCNC: 136 MMOL/L (ref 136–145)
WBC # BLD AUTO: 7.2 X10*3/UL (ref 4.4–11.3)

## 2024-10-14 PROCEDURE — 2500000001 HC RX 250 WO HCPCS SELF ADMINISTERED DRUGS (ALT 637 FOR MEDICARE OP): Performed by: CLINICAL NURSE SPECIALIST

## 2024-10-14 PROCEDURE — 82435 ASSAY OF BLOOD CHLORIDE: CPT | Performed by: ORTHOPAEDIC SURGERY

## 2024-10-14 PROCEDURE — 1100000001 HC PRIVATE ROOM DAILY

## 2024-10-14 PROCEDURE — 85027 COMPLETE CBC AUTOMATED: CPT | Performed by: ORTHOPAEDIC SURGERY

## 2024-10-14 PROCEDURE — 36415 COLL VENOUS BLD VENIPUNCTURE: CPT | Performed by: ORTHOPAEDIC SURGERY

## 2024-10-14 PROCEDURE — 2500000001 HC RX 250 WO HCPCS SELF ADMINISTERED DRUGS (ALT 637 FOR MEDICARE OP): Performed by: ORTHOPAEDIC SURGERY

## 2024-10-14 PROCEDURE — 2500000002 HC RX 250 W HCPCS SELF ADMINISTERED DRUGS (ALT 637 FOR MEDICARE OP, ALT 636 FOR OP/ED): Performed by: ORTHOPAEDIC SURGERY

## 2024-10-14 PROCEDURE — 2500000004 HC RX 250 GENERAL PHARMACY W/ HCPCS (ALT 636 FOR OP/ED): Performed by: ORTHOPAEDIC SURGERY

## 2024-10-14 RX ORDER — OXYCODONE HYDROCHLORIDE 5 MG/1
5-10 TABLET ORAL EVERY 4 HOURS PRN
Qty: 12 TABLET | Refills: 0 | Status: SHIPPED | OUTPATIENT
Start: 2024-10-14 | End: 2024-10-17

## 2024-10-14 ASSESSMENT — COGNITIVE AND FUNCTIONAL STATUS - GENERAL
TURNING FROM BACK TO SIDE WHILE IN FLAT BAD: A LITTLE
DRESSING REGULAR UPPER BODY CLOTHING: A LITTLE
HELP NEEDED FOR BATHING: A LITTLE
MOVING TO AND FROM BED TO CHAIR: A LITTLE
DAILY ACTIVITIY SCORE: 20
WALKING IN HOSPITAL ROOM: A LITTLE
STANDING UP FROM CHAIR USING ARMS: A LITTLE
MOBILITY SCORE: 18
TOILETING: A LITTLE
MOVING FROM LYING ON BACK TO SITTING ON SIDE OF FLAT BED WITH BEDRAILS: A LITTLE
CLIMB 3 TO 5 STEPS WITH RAILING: A LITTLE
DRESSING REGULAR LOWER BODY CLOTHING: A LITTLE

## 2024-10-14 ASSESSMENT — PAIN DESCRIPTION - LOCATION
LOCATION: ANKLE
LOCATION: FOOT

## 2024-10-14 ASSESSMENT — PAIN SCALES - GENERAL
PAINLEVEL_OUTOF10: 7
PAINLEVEL_OUTOF10: 7
PAINLEVEL_OUTOF10: 8
PAINLEVEL_OUTOF10: 5 - MODERATE PAIN
PAINLEVEL_OUTOF10: 9
PAINLEVEL_OUTOF10: 5 - MODERATE PAIN
PAINLEVEL_OUTOF10: 8
PAINLEVEL_OUTOF10: 9
PAINLEVEL_OUTOF10: 0 - NO PAIN
PAINLEVEL_OUTOF10: 4
PAINLEVEL_OUTOF10: 0 - NO PAIN
PAINLEVEL_OUTOF10: 0 - NO PAIN

## 2024-10-14 ASSESSMENT — PAIN DESCRIPTION - DESCRIPTORS
DESCRIPTORS: SPASM;ACHING
DESCRIPTORS: CRAMPING

## 2024-10-14 ASSESSMENT — PAIN - FUNCTIONAL ASSESSMENT
PAIN_FUNCTIONAL_ASSESSMENT: 0-10

## 2024-10-14 ASSESSMENT — PAIN DESCRIPTION - ORIENTATION
ORIENTATION: RIGHT
ORIENTATION: RIGHT

## 2024-10-14 NOTE — CARE PLAN
The patient's goals for the shift include Pain control    The clinical goals for the shift include pt to remain HDS throughout duration of shift      Problem: Pain - Adult  Goal: Verbalizes/displays adequate comfort level or baseline comfort level  Outcome: Progressing     Problem: Safety - Adult  Goal: Free from fall injury  Outcome: Progressing     Problem: Discharge Planning  Goal: Discharge to home or other facility with appropriate resources  Outcome: Progressing     Problem: Chronic Conditions and Co-morbidities  Goal: Patient's chronic conditions and co-morbidity symptoms are monitored and maintained or improved  Outcome: Progressing     Problem: Pain  Goal: Takes deep breaths with improved pain control throughout the shift  Outcome: Progressing  Goal: Turns in bed with improved pain control throughout the shift  Outcome: Progressing  Goal: Walks with improved pain control throughout the shift  Outcome: Progressing  Goal: Performs ADL's with improved pain control throughout shift  Outcome: Progressing  Goal: Participates in PT with improved pain control throughout the shift  Outcome: Progressing  Goal: Free from opioid side effects throughout the shift  Outcome: Progressing  Goal: Free from acute confusion related to pain meds throughout the shift  Outcome: Progressing     Problem: Skin  Goal: Decreased wound size/increased tissue granulation at next dressing change  Outcome: Progressing  Goal: Participates in plan/prevention/treatment measures  Outcome: Progressing  Goal: Prevent/manage excess moisture  Outcome: Progressing  Goal: Prevent/minimize sheer/friction injuries  Outcome: Progressing  Goal: Promote/optimize nutrition  Outcome: Progressing  Goal: Promote skin healing  Outcome: Progressing

## 2024-10-14 NOTE — PROGRESS NOTES
POD 3    COMFORTABLE  DRESSING DRY  [place] INTACT  STABLE  PT WBAT AND PLAN SNF.  BOOT OFF WHILE IN BED

## 2024-10-14 NOTE — PROGRESS NOTES
Stephanie Alatorre is a 54 y.o. female on day 3 of admission presenting with Ankle syndesmosis disruption, right, initial encounter.  Patient is planned to return to [facility].  CarePort reviewed for updates.  Patient can return, if auth is approved for skilled return.  PT/OT rec mod intensity level therapy, [facility] 13/17.  Provider and nursing updated.  Care Transitions will continue to follow.    10:08 am addendum  Clinical updates sent to [place].  Care Transitions will continue to follow.    10:15 am addendum  Patient has a written discharge order.  Sent message to facility via [employer] to start pre-cert.  Care Transitions will continue to follow.

## 2024-10-14 NOTE — DISCHARGE SUMMARY
Discharge Diagnosis  Ankle syndesmosis disruption, right, initial encounter  Obesity with BMI 68.95    Issues Requiring Follow-Up  None     Test Results Pending At Discharge  Pending Labs       No current pending labs.            Hospital Course   Patient is a 54 year old female with is approximately 3 weeks s/p ORIF right ankle fracture.  Has been at extended care facility since hospitalization and has been maintaining non weight bearing status to that extremity.  Patient was seen in office for follow up.  X-ray films revealed fibula fracture in good position with medial shift and widening of syndesmosis.  CT Scan was completed for further evaluation.  On 10/11/24, patient underwent uncomplicated syndesmosis fixation right ankle by Dr Taran Wymna.  One dose of IV antibiotics was given preoperatively and 2 additional doses were administered post operatively.  She was ordered physical and occupational therapy with weight bearing as tolerated in fracture boot.  Patient progressed fairly well with therapy.  She was still having difficulty with stair climbing and SNF placement was recommended.  Per surgeon, no need for further anticoagulant therapy upon discharge.  On 10/14/24, patient was medically stable for discharge awaiting insurance approval for skilled placement at [facility].  For pain management, patient was ordered to continue with oxycodone PRN.  Anticipate need for extended use of narcotics as well as higher MED requirements based on usual of medication at hospital as  well as patient's size.  She will follow up in office with Dr Wyman in 10 to 14 days.      Pertinent Physical Exam At Time of Discharge  Physical Exam    Home Medications     Medication List      CHANGE how you take these medications     acetaminophen 325 mg capsule; Commonly known as: Tylenol; What changed:   Another medication with the same name was removed. Continue taking this   medication, and follow the directions you  see here.   cholecalciferol (vitamin D3) 250 mcg (10,000 unit) capsule; What   changed: Another medication with the same name was removed. Continue   taking this medication, and follow the directions you see here.   oxyCODONE 5 mg immediate release tablet; Commonly known as: Roxicodone;   Take 1-2 tablets (5-10 mg) by mouth every 4 hours if needed for severe   pain (7 - 10) for up to 3 days.; What changed: how much to take, reasons   to take this     CONTINUE taking these medications     alum-mag hydroxide-simeth 200-200-20 mg/5 mL oral suspension; Commonly   known as: Mylanta   aspirin 81 mg EC tablet   atorvastatin 40 mg tablet; Commonly known as: Lipitor   b complex vitamins capsule   calcium carbonate-vitamin D3 500 mg-5 mcg (200 unit) tablet   cyanocobalamin 1,000 mcg tablet; Commonly known as: Vitamin B-12   Daily Multi-Vitamin tablet; Generic drug: multivitamin   Dulcolax (bisacodyl) 10 mg suppository; Generic drug: bisacodyl   gabapentin 100 mg capsule; Commonly known as: Neurontin   lisinopril 10 mg tablet; Take 1 tablet (10 mg) by mouth once daily.   magnesium hydroxide 400 mg/5 mL suspension; Commonly known as: Milk of   Magnesia   melatonin 3 mg capsule   pantoprazole 20 mg EC tablet; Commonly known as: ProtoNix   polyethylene glycol 17 gram packet; Commonly known as: Glycolax,   Miralax; Take 17 g by mouth once daily as needed (Constipation).   pyridoxine 50 mg tablet; Commonly known as: Vitamin B-6   zinc sulfate 220 (50 Zn) MG capsule; Commonly known as: Zincate   ZyrTEC 10 mg tablet; Generic drug: cetirizine     STOP taking these medications     turmeric root extract 500 mg capsule       Outpatient Follow-Up  No future appointments.    Martha Gautam, APRN-CNS

## 2024-10-15 ENCOUNTER — NURSING HOME VISIT (OUTPATIENT)
Dept: POST ACUTE CARE | Facility: EXTERNAL LOCATION | Age: 54
End: 2024-10-15
Payer: COMMERCIAL

## 2024-10-15 VITALS
DIASTOLIC BLOOD PRESSURE: 79 MMHG | TEMPERATURE: 95.9 F | SYSTOLIC BLOOD PRESSURE: 137 MMHG | WEIGHT: 293 LBS | BODY MASS INDEX: 53.92 KG/M2 | HEIGHT: 62 IN | OXYGEN SATURATION: 97 % | RESPIRATION RATE: 18 BRPM | HEART RATE: 79 BPM

## 2024-10-15 DIAGNOSIS — I10 ESSENTIAL HYPERTENSION, BENIGN: ICD-10-CM

## 2024-10-15 DIAGNOSIS — S82.891D CLOSED FRACTURE OF RIGHT ANKLE WITH ROUTINE HEALING, SUBSEQUENT ENCOUNTER: Primary | ICD-10-CM

## 2024-10-15 DIAGNOSIS — E66.01 MORBID OBESITY (MULTI): ICD-10-CM

## 2024-10-15 DIAGNOSIS — R53.1 WEAKNESS: ICD-10-CM

## 2024-10-15 PROCEDURE — 2500000002 HC RX 250 W HCPCS SELF ADMINISTERED DRUGS (ALT 637 FOR MEDICARE OP, ALT 636 FOR OP/ED): Performed by: ORTHOPAEDIC SURGERY

## 2024-10-15 PROCEDURE — 2500000004 HC RX 250 GENERAL PHARMACY W/ HCPCS (ALT 636 FOR OP/ED): Performed by: ORTHOPAEDIC SURGERY

## 2024-10-15 PROCEDURE — 2500000001 HC RX 250 WO HCPCS SELF ADMINISTERED DRUGS (ALT 637 FOR MEDICARE OP): Performed by: CLINICAL NURSE SPECIALIST

## 2024-10-15 PROCEDURE — 2500000001 HC RX 250 WO HCPCS SELF ADMINISTERED DRUGS (ALT 637 FOR MEDICARE OP): Performed by: ORTHOPAEDIC SURGERY

## 2024-10-15 PROCEDURE — 99305 1ST NF CARE MODERATE MDM 35: CPT | Performed by: INTERNAL MEDICINE

## 2024-10-15 ASSESSMENT — PAIN - FUNCTIONAL ASSESSMENT
PAIN_FUNCTIONAL_ASSESSMENT: 0-10

## 2024-10-15 ASSESSMENT — PAIN DESCRIPTION - ORIENTATION
ORIENTATION: RIGHT

## 2024-10-15 ASSESSMENT — PAIN DESCRIPTION - DESCRIPTORS
DESCRIPTORS: ACHING;DULL
DESCRIPTORS: CRAMPING
DESCRIPTORS: CRAMPING

## 2024-10-15 ASSESSMENT — PAIN SCALES - GENERAL
PAINLEVEL_OUTOF10: 5 - MODERATE PAIN
PAINLEVEL_OUTOF10: 9
PAINLEVEL_OUTOF10: 6
PAINLEVEL_OUTOF10: 7
PAINLEVEL_OUTOF10: 5 - MODERATE PAIN

## 2024-10-15 ASSESSMENT — PAIN DESCRIPTION - LOCATION
LOCATION: ANKLE

## 2024-10-15 NOTE — NURSING NOTE
Report given to Tia at [facility]. Iv discontiued. Discharge instructions printed and sent in envelope, oxycodone script is in envelope.
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
## 2024-10-15 NOTE — CARE PLAN
The patient's goals for the shift include Pain control    The clinical goals for the shift include pt to remain HDS throughout duration of shift

## 2024-10-15 NOTE — CARE PLAN
Problem: Safety - Adult  Goal: Free from fall injury  Outcome: Progressing     Problem: Discharge Planning  Goal: Discharge to home or other facility with appropriate resources  Outcome: Progressing     Problem: Chronic Conditions and Co-morbidities  Goal: Patient's chronic conditions and co-morbidity symptoms are monitored and maintained or improved  Outcome: Progressing     Problem: Pain  Goal: Takes deep breaths with improved pain control throughout the shift  Outcome: Progressing  Goal: Turns in bed with improved pain control throughout the shift  Outcome: Progressing  Goal: Walks with improved pain control throughout the shift  Outcome: Progressing  Goal: Performs ADL's with improved pain control throughout shift  Outcome: Progressing  Goal: Participates in PT with improved pain control throughout the shift  Outcome: Progressing  Goal: Free from opioid side effects throughout the shift  Outcome: Progressing  Goal: Free from acute confusion related to pain meds throughout the shift  Outcome: Progressing     Problem: Skin  Goal: Decreased wound size/increased tissue granulation at next dressing change  Outcome: Progressing  Goal: Participates in plan/prevention/treatment measures  Outcome: Progressing  Goal: Prevent/manage excess moisture  Outcome: Progressing  Goal: Prevent/minimize sheer/friction injuries  Outcome: Progressing  Goal: Promote/optimize nutrition  Outcome: Progressing  Goal: Promote skin healing  Outcome: Progressing

## 2024-10-15 NOTE — PROGRESS NOTES
CarePort reviewed for updates.  Precert is still pending for patient to go to [facility].  This TCC met with patient at bedside and provided update.  Care Transitions will continue to follow.    10:58 am addendum  CarePort reviewed for update.  Received message from facility, Auth# [id number] approved for [facility].  Await confirmation of bed availability.  Care Transitions will continue to follow.     11:22 am addendum  [facility] ok to accept patient today.  DSC tasked with requesting transport.  Nursing updated and updated patient.  Care Transitions will continue to follow.    12:08 pm addendum  The Wheelchair Van is scheduled to arrive at 1:00 pm EDT.  [facility] is handling this ride and you can contact them at [phone].  Report number is [phone].  Nursing updated and will update patient.  Care Transitions will continue to follow.

## 2024-10-16 ENCOUNTER — NURSING HOME VISIT (OUTPATIENT)
Dept: POST ACUTE CARE | Facility: EXTERNAL LOCATION | Age: 54
End: 2024-10-16
Payer: COMMERCIAL

## 2024-10-16 DIAGNOSIS — I10 ESSENTIAL HYPERTENSION, BENIGN: ICD-10-CM

## 2024-10-16 DIAGNOSIS — D64.9 ANEMIA, UNSPECIFIED TYPE: ICD-10-CM

## 2024-10-16 DIAGNOSIS — R53.1 WEAKNESS: Primary | ICD-10-CM

## 2024-10-16 PROCEDURE — 99308 SBSQ NF CARE LOW MDM 20: CPT | Performed by: REGISTERED NURSE

## 2024-10-16 NOTE — LETTER
Patient: Stephanie Alatorre  : 1970    Encounter Date: 10/16/2024    PROGRESS NOTE    Subjective  Chief complaint: Stephanie Alatorre is a 54 y.o. female who is an acute skilled patient being seen and evaluated for weakness    HPI:  Patient has no new complaints or concerns today.  Continues working towards goals in therapy.  Denies constitutional symptoms.    Objective  Vital signs: 124/68, 97.8, 78, 18, 97%    Physical Exam  Constitutional:       General: She is not in acute distress.  Eyes:      Extraocular Movements: Extraocular movements intact.   Pulmonary:      Effort: Pulmonary effort is normal.   Musculoskeletal:      Cervical back: Neck supple.   Neurological:      Mental Status: She is alert.   Psychiatric:         Mood and Affect: Mood normal.         Behavior: Behavior is cooperative.         Assessment/Plan  Problem List Items Addressed This Visit       Anemia     Monitor hgb         Essential hypertension, benign     Continue current medications          Weakness - Primary     Pt/ot           Medications, treatments, and labs reviewed  Continue medications and treatments as listed in [facility]    Scribe Attestation  Luanne FOSTER Scribe   attest that this documentation has been prepared under the direction and in the presence of RJ Fernández.    Provider Attestation - Scribe documentation  All medical record entries made by the Scribe were at my direction and personally dictated by me. I have reviewed the chart and agree that the record accurately reflects my personal performance of the history, physical exam, discussion and plan.    RJ Fernández        Electronically Signed By: RJ Fernández   10/23/24  4:33 PM

## 2024-10-16 NOTE — PROGRESS NOTES
HISTORY & PHYSICAL    Subjective   Chief complaint: Stephanie Alatorre is a 54 y.o. female who is a acute skilled care patient being seen and evaluated for multiple medical problems.  Patient presents for weakness     HPI:  Patient is a 54 year old female with is approximately 3 weeks s/p ORIF right ankle fracture.  Has been at extended care facility since hospitalization and has been maintaining non weight bearing status to that extremity.  Patient was seen in office for follow up.  X-ray films revealed fibula fracture in good position with medial shift and widening of syndesmosis.  CT Scan was completed for further evaluation.  On 10/11/24, patient underwent uncomplicated syndesmosis fixation right ankle by Dr Taran Wyman.  One dose of IV antibiotics was given preoperatively and 2 additional doses were administered post operatively.  She was ordered physical and occupational therapy with weight bearing as tolerated in fracture boot. Patient progressed fairly well with therapy.  She was still having difficulty with stair climbing and SNF placement was recommended.           Past Medical History:   Diagnosis Date    Personal history of other (healed) physical injury and trauma 10/09/2015    History of sprain of ankle       Past Surgical History:   Procedure Laterality Date    OTHER SURGICAL HISTORY  04/07/2022    Cholecystectomy    OTHER SURGICAL HISTORY  04/07/2022    Kamila-en-Y gastric bypass       Family History   Problem Relation Name Age of Onset    Other (arteriosclerotic cardiovascular) Mother      Other (Other) Sister         Social History     Socioeconomic History    Marital status: Single   Tobacco Use    Smoking status: Never     Passive exposure: Never    Smokeless tobacco: Never   Substance and Sexual Activity    Alcohol use: Not Currently    Drug use: Not Currently    Sexual activity: Not Currently     Social Drivers of Health     Financial Resource Strain: Low Risk  (10/11/2024)    Overall Financial  Resource Strain (CARDIA)     Difficulty of Paying Living Expenses: Not hard at all   Food Insecurity: No Food Insecurity (10/11/2024)    Hunger Vital Sign     Worried About Running Out of Food in the Last Year: Never true     Ran Out of Food in the Last Year: Never true   Transportation Needs: No Transportation Needs (10/11/2024)    PRAPARE - Transportation     Lack of Transportation (Medical): No     Lack of Transportation (Non-Medical): No   Intimate Partner Violence: Not At Risk (10/11/2024)    Humiliation, Afraid, Rape, and Kick questionnaire     Fear of Current or Ex-Partner: No     Emotionally Abused: No     Physically Abused: No     Sexually Abused: No   Housing Stability: Low Risk  (10/11/2024)    Housing Stability Vital Sign     Unable to Pay for Housing in the Last Year: No     Number of Times Moved in the Last Year: 1     Homeless in the Last Year: No       Vital signs:  133/62, 97.6, 77, 18, 99%    Objective   Physical Exam  HENT:      Head: Normocephalic and atraumatic.      Nose: Nose normal.      Mouth/Throat:      Mouth: Mucous membranes are moist.      Pharynx: Oropharynx is clear.   Eyes:      Extraocular Movements: Extraocular movements intact.      Pupils: Pupils are equal, round, and reactive to light.   Cardiovascular:      Rate and Rhythm: Normal rate and regular rhythm.   Pulmonary:      Effort: No respiratory distress.      Breath sounds: Normal breath sounds. No wheezing, rhonchi or rales.   Abdominal:      General: Bowel sounds are normal. There is no distension.      Palpations: Abdomen is soft.      Tenderness: There is no abdominal tenderness. There is no guarding.   Musculoskeletal:      Right lower leg: No edema.      Left lower leg: No edema.   Skin:     General: Skin is warm and dry.   Neurological:      Mental Status: She is alert. Mental status is at baseline.         Assessment/Plan   Problem List Items Addressed This Visit       Essential hypertension, benign     Continue  current medications          Weakness     PT OT         Morbid obesity (Multi)     Dietician referral          Closed fracture of right ankle - Primary     S/p ORIF  Pain control  PT OT  F/u Ortho outpatient           Hospital records reviewed  Medications, treatments, and labs reviewed  Continue medications and treatments as listed in EMR  Discussed with nursing and therapy    Jesse Coronel DO    Scribe Attestation  Saida FOSTER   attest that this documentation has been prepared under the direction and in the presence of Jesse Coronel DO    Provider Attestation - Scribe documentation  All medical record entries made by the Scribe were at my direction and personally dictated by me. I have reviewed the chart and agree that the record accurately reflects my personal performance of the history, physical exam, discussion and plan.

## 2024-10-17 NOTE — PROGRESS NOTES
PROGRESS NOTE    Subjective   Chief complaint: Stephanie Alatorre is a 54 y.o. female who is an acute skilled patient being seen and evaluated for weakness    HPI:  Patient has no new complaints or concerns today.  Continues working towards goals in therapy.  Denies constitutional symptoms.    Objective   Vital signs: 124/68, 97.8, 78, 18, 97%    Physical Exam  Constitutional:       General: She is not in acute distress.  Eyes:      Extraocular Movements: Extraocular movements intact.   Pulmonary:      Effort: Pulmonary effort is normal.   Musculoskeletal:      Cervical back: Neck supple.   Neurological:      Mental Status: She is alert.   Psychiatric:         Mood and Affect: Mood normal.         Behavior: Behavior is cooperative.         Assessment/Plan   Problem List Items Addressed This Visit       Anemia     Monitor hgb         Essential hypertension, benign     Continue current medications          Weakness - Primary     Pt/ot           Medications, treatments, and labs reviewed  Continue medications and treatments as listed in [facility]    Scribe Attestation  Luanne FOSTER Scribe   attest that this documentation has been prepared under the direction and in the presence of RJ Fernández.    Provider Attestation - Scribe documentation  All medical record entries made by the Scribe were at my direction and personally dictated by me. I have reviewed the chart and agree that the record accurately reflects my personal performance of the history, physical exam, discussion and plan.    RJ Fernández

## 2024-10-18 ENCOUNTER — NURSING HOME VISIT (OUTPATIENT)
Dept: POST ACUTE CARE | Facility: EXTERNAL LOCATION | Age: 54
End: 2024-10-18
Payer: COMMERCIAL

## 2024-10-18 DIAGNOSIS — R53.1 WEAKNESS: ICD-10-CM

## 2024-10-18 DIAGNOSIS — I10 ESSENTIAL HYPERTENSION, BENIGN: ICD-10-CM

## 2024-10-18 DIAGNOSIS — D64.9 ANEMIA, UNSPECIFIED TYPE: Primary | ICD-10-CM

## 2024-10-18 DIAGNOSIS — S82.891D CLOSED FRACTURE OF RIGHT ANKLE WITH ROUTINE HEALING, SUBSEQUENT ENCOUNTER: ICD-10-CM

## 2024-10-18 PROCEDURE — 99308 SBSQ NF CARE LOW MDM 20: CPT | Performed by: REGISTERED NURSE

## 2024-10-18 NOTE — LETTER
Patient: Stephanie Alatorre  : 1970    Encounter Date: 10/18/2024    PROGRESS NOTE    Subjective  Chief complaint: Stephanie Alatorre is a 54 y.o. female who is an acute skilled patient being seen and evaluated for weakness    HPI:  Therapy has been working with the patient to improve strength and endurance with ADLs, transfers, and mobility.  Patient continues to work toward goals.  Patient is stable and has no new complaints.  Nursing staff voices no new concerns today.    Objective  Vital signs: 140/80, 96.8, 77, 18, 97%    Physical Exam  Constitutional:       General: She is not in acute distress.  Eyes:      Extraocular Movements: Extraocular movements intact.   Pulmonary:      Effort: Pulmonary effort is normal.   Musculoskeletal:      Cervical back: Neck supple.   Neurological:      Mental Status: She is alert.   Psychiatric:         Mood and Affect: Mood normal.         Behavior: Behavior is cooperative.         Assessment/Plan  Problem List Items Addressed This Visit       Anemia - Primary     Monitor hgb         Essential hypertension, benign     Continue current medications          Weakness     Pt/ot          Closed fracture of right ankle     Pain meds  PT OT            Medications, treatments, and labs reviewed  Continue medications and treatments as listed in [facility]    Scribe Attestation  I, Lance Quiroga   attest that this documentation has been prepared under the direction and in the presence of RJ Fernández.    Provider Attestation - Scribe documentation  All medical record entries made by the Scribe were at my direction and personally dictated by me. I have reviewed the chart and agree that the record accurately reflects my personal performance of the history, physical exam, discussion and plan.    RJ Fernández        Electronically Signed By: RJ Fernández   10/24/24  9:49 AM

## 2024-10-21 ENCOUNTER — NURSING HOME VISIT (OUTPATIENT)
Dept: POST ACUTE CARE | Facility: EXTERNAL LOCATION | Age: 54
End: 2024-10-21
Payer: COMMERCIAL

## 2024-10-21 DIAGNOSIS — S82.891A CLOSED FRACTURE OF RIGHT ANKLE, INITIAL ENCOUNTER: ICD-10-CM

## 2024-10-21 DIAGNOSIS — R53.1 WEAKNESS: Primary | ICD-10-CM

## 2024-10-21 DIAGNOSIS — I10 ESSENTIAL HYPERTENSION, BENIGN: ICD-10-CM

## 2024-10-21 PROCEDURE — 99308 SBSQ NF CARE LOW MDM 20: CPT | Performed by: INTERNAL MEDICINE

## 2024-10-21 NOTE — LETTER
Patient: Stephanie Alatorre  : 1970    Encounter Date: 10/21/2024    PROGRESS NOTE    Subjective  Chief complaint: Stephanie Alatorre is a 54 y.o. female who is an acute skilled patient being seen and evaluated for weakness    HPI:  Patient has no new complaints or concerns today.  Patient is working in therapy.  Denies n/v/f/c.          Objective  Vital signs: 128/64, 97.0, 77, 18, 97%    Physical Exam  Constitutional:       General: She is not in acute distress.  Eyes:      Extraocular Movements: Extraocular movements intact.   Cardiovascular:      Rate and Rhythm: Normal rate and regular rhythm.   Pulmonary:      Effort: Pulmonary effort is normal.      Breath sounds: Normal breath sounds.   Abdominal:      General: Bowel sounds are normal.      Palpations: Abdomen is soft.   Musculoskeletal:      Cervical back: Neck supple.      Right lower leg: No edema.      Left lower leg: No edema.   Neurological:      Mental Status: She is alert.      Motor: Weakness present.   Psychiatric:         Mood and Affect: Mood normal.         Assessment/Plan  Problem List Items Addressed This Visit       Essential hypertension, benign     Continue current medications   Monitor BP  BP stable         Weakness - Primary     Pt/ot          Closed fracture of right ankle, initial encounter     Pain meds  PT OT          Medications, treatments, and labs reviewed  Continue medications and treatments as listed in EMR      Scribe Attestation  Kevin FOSTER Scribe   attest that this documentation has been prepared under the direction and in the presence of Jesse Coronel DO    Provider Attestation - Scribe documentation  All medical record entries made by the Scribe were at my direction and personally dictated by me. I have reviewed the chart and agree that the record accurately reflects my personal performance of the history, physical exam, discussion and plan.   Jesse Coronel DO        Electronically Signed By:  Jesse Coronel, DO   12/29/24  5:38 PM

## 2024-10-22 ENCOUNTER — NURSING HOME VISIT (OUTPATIENT)
Dept: POST ACUTE CARE | Facility: EXTERNAL LOCATION | Age: 54
End: 2024-10-22
Payer: COMMERCIAL

## 2024-10-22 DIAGNOSIS — R53.1 WEAKNESS: Primary | ICD-10-CM

## 2024-10-22 DIAGNOSIS — I10 ESSENTIAL HYPERTENSION, BENIGN: ICD-10-CM

## 2024-10-22 DIAGNOSIS — S82.891D CLOSED FRACTURE OF RIGHT ANKLE WITH ROUTINE HEALING, SUBSEQUENT ENCOUNTER: ICD-10-CM

## 2024-10-22 PROCEDURE — 99308 SBSQ NF CARE LOW MDM 20: CPT | Performed by: REGISTERED NURSE

## 2024-10-22 NOTE — PROGRESS NOTES
PROGRESS NOTE    Subjective   Chief complaint: Stephanie Alatorre is a 54 y.o. female who is an acute skilled patient being seen and evaluated for weakness    HPI:  Therapy has been working with the patient to improve strength and endurance with ADLs, transfers, and mobility.  Patient continues to work toward goals.  Patient is stable and has no new complaints.  Nursing staff voices no new concerns today.    Objective   Vital signs: 140/80, 96.8, 77, 18, 97%    Physical Exam  Constitutional:       General: She is not in acute distress.  Eyes:      Extraocular Movements: Extraocular movements intact.   Pulmonary:      Effort: Pulmonary effort is normal.   Musculoskeletal:      Cervical back: Neck supple.   Neurological:      Mental Status: She is alert.   Psychiatric:         Mood and Affect: Mood normal.         Behavior: Behavior is cooperative.         Assessment/Plan   Problem List Items Addressed This Visit       Anemia - Primary     Monitor hgb         Essential hypertension, benign     Continue current medications          Weakness     Pt/ot          Closed fracture of right ankle     Pain meds  PT OT            Medications, treatments, and labs reviewed  Continue medications and treatments as listed in [facility]    Scribe Attestation  ILuanne Scribe   attest that this documentation has been prepared under the direction and in the presence of RJ Fernández.    Provider Attestation - Scribe documentation  All medical record entries made by the Scribe were at my direction and personally dictated by me. I have reviewed the chart and agree that the record accurately reflects my personal performance of the history, physical exam, discussion and plan.    RJ Fernández

## 2024-10-23 ENCOUNTER — NURSING HOME VISIT (OUTPATIENT)
Dept: POST ACUTE CARE | Facility: EXTERNAL LOCATION | Age: 54
End: 2024-10-23
Payer: COMMERCIAL

## 2024-10-23 DIAGNOSIS — I10 ESSENTIAL HYPERTENSION, BENIGN: ICD-10-CM

## 2024-10-23 DIAGNOSIS — D64.9 ANEMIA, UNSPECIFIED TYPE: ICD-10-CM

## 2024-10-23 DIAGNOSIS — S82.891D CLOSED FRACTURE OF RIGHT ANKLE WITH ROUTINE HEALING, SUBSEQUENT ENCOUNTER: ICD-10-CM

## 2024-10-23 DIAGNOSIS — R53.1 WEAKNESS: Primary | ICD-10-CM

## 2024-10-23 PROCEDURE — 99308 SBSQ NF CARE LOW MDM 20: CPT | Performed by: REGISTERED NURSE

## 2024-10-25 ENCOUNTER — NURSING HOME VISIT (OUTPATIENT)
Dept: POST ACUTE CARE | Facility: EXTERNAL LOCATION | Age: 54
End: 2024-10-25
Payer: COMMERCIAL

## 2024-10-25 DIAGNOSIS — S82.891D CLOSED FRACTURE OF RIGHT ANKLE WITH ROUTINE HEALING, SUBSEQUENT ENCOUNTER: ICD-10-CM

## 2024-10-25 DIAGNOSIS — R53.1 WEAKNESS: Primary | ICD-10-CM

## 2024-10-25 DIAGNOSIS — D50.9 IRON DEFICIENCY ANEMIA, UNSPECIFIED IRON DEFICIENCY ANEMIA TYPE: ICD-10-CM

## 2024-10-25 PROCEDURE — 99308 SBSQ NF CARE LOW MDM 20: CPT | Performed by: REGISTERED NURSE

## 2024-10-29 ENCOUNTER — NURSING HOME VISIT (OUTPATIENT)
Dept: POST ACUTE CARE | Facility: EXTERNAL LOCATION | Age: 54
End: 2024-10-29
Payer: COMMERCIAL

## 2024-10-29 DIAGNOSIS — I10 ESSENTIAL HYPERTENSION, BENIGN: ICD-10-CM

## 2024-10-29 DIAGNOSIS — R53.1 WEAKNESS: Primary | ICD-10-CM

## 2024-10-29 DIAGNOSIS — S82.891D CLOSED FRACTURE OF RIGHT ANKLE WITH ROUTINE HEALING, SUBSEQUENT ENCOUNTER: ICD-10-CM

## 2024-10-29 PROCEDURE — 99309 SBSQ NF CARE MODERATE MDM 30: CPT | Performed by: REGISTERED NURSE

## 2024-10-29 NOTE — LETTER
Patient: Stephanie Alatorre  : 1970    Encounter Date: 10/29/2024    PROGRESS NOTE    Subjective  Chief complaint: Stephanie Alatorre is a 54 y.o. female patient being seen and evaluated for monthly general medical care and follow-up    HPI:  Patient presents for general medical care and f/u.  Patient seen and examined at bedside.  No issues per nursing.  Patient has no acute complaints.        Objective  Vital signs: 146/78, 97, 72, 16, 98%    Physical Exam  Constitutional:       General: She is not in acute distress.  Eyes:      Extraocular Movements: Extraocular movements intact.   Pulmonary:      Effort: Pulmonary effort is normal.   Musculoskeletal:      Cervical back: Neck supple.   Neurological:      Mental Status: She is alert.   Psychiatric:         Mood and Affect: Mood normal.         Behavior: Behavior is cooperative.         Assessment/Plan  Problem List Items Addressed This Visit       Essential hypertension, benign     Continue current medications          Weakness - Primary     Encourage pt to ask for assistance          Closed fracture of right ankle     Pain mgmt             Medications, treatments, and labs reviewed  Continue medications and treatments as listed in EMR    Scribe Attestation  I, Lance Quiroga   attest that this documentation has been prepared under the direction and in the presence of RJ Fernández.    Provider Attestation - Scribe documentation  All medical record entries made by the Scribe were at my direction and personally dictated by me. I have reviewed the chart and agree that the record accurately reflects my personal performance of the history, physical exam, discussion and plan.    RJ Fernández          Electronically Signed By: RJ Fernández   24  7:59 PM

## 2024-10-30 ENCOUNTER — NURSING HOME VISIT (OUTPATIENT)
Dept: POST ACUTE CARE | Facility: EXTERNAL LOCATION | Age: 54
End: 2024-10-30
Payer: COMMERCIAL

## 2024-10-30 DIAGNOSIS — D64.9 ANEMIA, UNSPECIFIED TYPE: Primary | ICD-10-CM

## 2024-10-30 DIAGNOSIS — S82.891D CLOSED FRACTURE OF RIGHT ANKLE WITH ROUTINE HEALING, SUBSEQUENT ENCOUNTER: ICD-10-CM

## 2024-10-30 DIAGNOSIS — R53.1 WEAKNESS: ICD-10-CM

## 2024-10-30 PROCEDURE — 99308 SBSQ NF CARE LOW MDM 20: CPT | Performed by: REGISTERED NURSE

## 2024-10-30 NOTE — LETTER
Patient: Stephanie Alatorre  : 1970    Encounter Date: 10/30/2024    PROGRESS NOTE    Subjective  Chief complaint: Stephanie Alatorre is a 54 y.o. female who is an acute skilled patient being seen and evaluated for weakness    HPI:  Patient has completed therapy and will be discharging today and is planning to DC home. Patient is stable with no new nursing concerns reported. Denies n/v/f/c, pain.    Objective  Vital signs: 146/78, 97, 72, 16, 98%    Physical Exam  Constitutional:       General: She is not in acute distress.  Eyes:      Extraocular Movements: Extraocular movements intact.   Pulmonary:      Effort: Pulmonary effort is normal.   Musculoskeletal:      Cervical back: Neck supple.   Neurological:      Mental Status: She is alert.   Psychiatric:         Mood and Affect: Mood normal.         Behavior: Behavior is cooperative.         Assessment/Plan  Problem List Items Addressed This Visit       Anemia - Primary     Monitor hgb         Weakness     Encourage pt to ask for assistance          Closed fracture of right ankle     Pain mgmt             Medications, treatments, and labs reviewed  Continue medications and treatments as listed in [facility]    Scribe Attestation  I, Lance Quiroga   attest that this documentation has been prepared under the direction and in the presence of RJ Fernández.    Provider Attestation - Scribe documentation  All medical record entries made by the Scribe were at my direction and personally dictated by me. I have reviewed the chart and agree that the record accurately reflects my personal performance of the history, physical exam, discussion and plan.    RJ Fernández        Electronically Signed By: RJ Fernández   24  3:41 PM   26

## 2024-10-30 NOTE — PROGRESS NOTES
PROGRESS NOTE    Subjective   Chief complaint: Stephanie Alatorre is a 54 y.o. female patient being seen and evaluated for monthly general medical care and follow-up    HPI:  Patient presents for general medical care and f/u.  Patient seen and examined at bedside.  No issues per nursing.  Patient has no acute complaints.        Objective   Vital signs: 146/78, 97, 72, 16, 98%    Physical Exam  Constitutional:       General: She is not in acute distress.  Eyes:      Extraocular Movements: Extraocular movements intact.   Pulmonary:      Effort: Pulmonary effort is normal.   Musculoskeletal:      Cervical back: Neck supple.   Neurological:      Mental Status: She is alert.   Psychiatric:         Mood and Affect: Mood normal.         Behavior: Behavior is cooperative.         Assessment/Plan   Problem List Items Addressed This Visit       Essential hypertension, benign     Continue current medications          Weakness - Primary     Encourage pt to ask for assistance          Closed fracture of right ankle     Pain mgmt             Medications, treatments, and labs reviewed  Continue medications and treatments as listed in EMR    Scribe Attestation  Luanne FOSTER Scribe   attest that this documentation has been prepared under the direction and in the presence of RJ Fernández.    Provider Attestation - Scribe documentation  All medical record entries made by the Scribe were at my direction and personally dictated by me. I have reviewed the chart and agree that the record accurately reflects my personal performance of the history, physical exam, discussion and plan.    RJ Fernández

## 2024-11-05 ENCOUNTER — APPOINTMENT (OUTPATIENT)
Dept: RADIOLOGY | Facility: CLINIC | Age: 54
End: 2024-11-05
Payer: COMMERCIAL

## 2024-11-07 NOTE — PROGRESS NOTES
PROGRESS NOTE    Subjective   Chief complaint: Stephanie Alatorre is a 54 y.o. female who is an acute skilled patient being seen and evaluated for weakness    HPI:  Therapy has been working with the patient to improve strength and endurance with ADLs, transfers, and mobility. Patient continues to work toward goals. Patient is stable and has no new complaints. Nursing staff voices no new concerns today.    Objective   Vital signs: 146/78, 97, 72, 16, 98%    Physical Exam  Constitutional:       General: She is not in acute distress.  Eyes:      Extraocular Movements: Extraocular movements intact.   Pulmonary:      Effort: Pulmonary effort is normal.   Musculoskeletal:      Cervical back: Neck supple.   Neurological:      Mental Status: She is alert.   Psychiatric:         Mood and Affect: Mood normal.         Behavior: Behavior is cooperative.         Assessment/Plan   Problem List Items Addressed This Visit       Weakness - Primary     Encourage pt to ask for assistance          Closed fracture of right ankle     Pain mgmt             Medications, treatments, and labs reviewed  Continue medications and treatments as listed in [facility]    Scribe Attestation  Luanne FOSTER Scribe   attest that this documentation has been prepared under the direction and in the presence of RJ Fernández.    Provider Attestation - Scribe documentation  All medical record entries made by the Scribe were at my direction and personally dictated by me. I have reviewed the chart and agree that the record accurately reflects my personal performance of the history, physical exam, discussion and plan.    RJ Fernández

## 2024-11-07 NOTE — PROGRESS NOTES
PROGRESS NOTE    Subjective   Chief complaint: Stephanie Alatorre is a 54 y.o. female who is an acute skilled patient being seen and evaluated for weakness    HPI:  Patient has completed therapy and will be discharging today and is planning to DC home. Patient is stable with no new nursing concerns reported. Denies n/v/f/c, pain.    Objective   Vital signs: 146/78, 97, 72, 16, 98%    Physical Exam  Constitutional:       General: She is not in acute distress.  Eyes:      Extraocular Movements: Extraocular movements intact.   Pulmonary:      Effort: Pulmonary effort is normal.   Musculoskeletal:      Cervical back: Neck supple.   Neurological:      Mental Status: She is alert.   Psychiatric:         Mood and Affect: Mood normal.         Behavior: Behavior is cooperative.         Assessment/Plan   Problem List Items Addressed This Visit       Anemia - Primary     Monitor hgb         Weakness     Encourage pt to ask for assistance          Closed fracture of right ankle     Pain mgmt             Medications, treatments, and labs reviewed  Continue medications and treatments as listed in [facility]    Scribe Attestation  I, Lance Quiroga   attest that this documentation has been prepared under the direction and in the presence of RJ Fernández.    Provider Attestation - Scribe documentation  All medical record entries made by the Scribe were at my direction and personally dictated by me. I have reviewed the chart and agree that the record accurately reflects my personal performance of the history, physical exam, discussion and plan.    RJ Fernández

## 2024-11-12 ENCOUNTER — APPOINTMENT (OUTPATIENT)
Dept: PRIMARY CARE | Facility: CLINIC | Age: 54
End: 2024-11-12
Payer: COMMERCIAL

## 2024-11-12 DIAGNOSIS — S93.431A ANKLE SYNDESMOSIS DISRUPTION, RIGHT, INITIAL ENCOUNTER: ICD-10-CM

## 2024-11-12 DIAGNOSIS — I10 PRIMARY HYPERTENSION: Primary | ICD-10-CM

## 2024-11-12 PROCEDURE — 99214 OFFICE O/P EST MOD 30 MIN: CPT | Performed by: FAMILY MEDICINE

## 2024-11-12 RX ORDER — CYCLOBENZAPRINE HCL 5 MG
5 TABLET ORAL 3 TIMES DAILY PRN
Qty: 90 TABLET | Refills: 1 | Status: SHIPPED | OUTPATIENT
Start: 2024-11-12 | End: 2025-01-11

## 2024-11-12 RX ORDER — VALSARTAN 80 MG/1
80 TABLET ORAL DAILY
Qty: 90 TABLET | Refills: 1 | Status: SHIPPED | OUTPATIENT
Start: 2024-11-12 | End: 2025-05-11

## 2024-11-12 ASSESSMENT — ENCOUNTER SYMPTOMS
SHORTNESS OF BREATH: 0
ACTIVITY CHANGE: 0
DIZZINESS: 0
FATIGUE: 0
FEVER: 0
HEADACHES: 0

## 2024-11-12 NOTE — PROGRESS NOTES
Subjective   Patient ID: Stephanie Alatorre is a 54 y.o. female who presents for Follow-up (Attempted pt left message to call office).    Virtual or Telephone Consent    An interactive audio and video telecommunication system which permits real time communications between the patient (at the originating site) and provider (at the distant site) was utilized to provide this telehealth service.   Verbal consent was requested and obtained from Stephanie Alatorre on this date, 11/13/24 for a telehealth visit.       Ankle Pain   - reports she has been dealing with persistent ankle pain   - has had multiple ankle surgeries since her last visit   - had a revision surgery 10/11/24  - was discharged to a rehabilitation center for physical therapy after the revision therapy   - discharged home on 10/28/24 to outpatient rehabilitation   - has been taking pain medication from the surgeon for her nerve issues   - taking gabapentin 100mg TID and flexaril to help manage her pain symptoms   - using oxycodone PRN to help at night with her sleeping   - currently doing physical therapy twice a week and occupational therapy twice a week and is making progress     Hypertension   - reports she has been monitoring her BP at home   - generally getting readings in the 120/70 range   - had lisinopril added during hospitalization and has been stable on the medication   - has been having a tickle/dry sensation in the throat   - claudia any significant dizziness, lightheadedness or headaches          Review of Systems   Constitutional:  Negative for activity change, fatigue and fever.   Respiratory:  Negative for shortness of breath.    Cardiovascular:  Negative for chest pain.   Neurological:  Negative for dizziness and headaches.       Objective   LMP  (LMP Unknown) Comment: hcg level sent to lab.    Physical Exam  Constitutional:       Appearance: Normal appearance.   Neurological:      Mental Status: She is alert.         Assessment/Plan   Problem  List Items Addressed This Visit             ICD-10-CM    Ankle syndesmosis disruption, right, initial encounter S93.431A     Stable  - following with ortho   - pain is well controlled   - refill flexaril to help with symptoms   - continue with physical therapy          Relevant Medications    cyclobenzaprine (Flexeril) 5 mg tablet     Other Visit Diagnoses         Codes    Primary hypertension    -  Primary I10    stable   - bp controlled on new medication   - change to losartan given cough   - f/u 3 months to monitor BP     Relevant Medications    valsartan (Diovan) 80 mg tablet

## 2024-11-13 NOTE — ASSESSMENT & PLAN NOTE
Stable  - following with ortho   - pain is well controlled   - refill flexaril to help with symptoms   - continue with physical therapy

## 2024-12-19 NOTE — PROGRESS NOTES
PROGRESS NOTE    Subjective   Chief complaint: Stephanie Alatorre is a 54 y.o. female who is an acute skilled patient being seen and evaluated for weakness    HPI:  Patient has no new complaints or concerns today.  Patient is working in therapy.  Denies n/v/f/c.          Objective   Vital signs: 128/64, 97.0, 77, 18, 97%    Physical Exam  Constitutional:       General: She is not in acute distress.  Eyes:      Extraocular Movements: Extraocular movements intact.   Cardiovascular:      Rate and Rhythm: Normal rate and regular rhythm.   Pulmonary:      Effort: Pulmonary effort is normal.      Breath sounds: Normal breath sounds.   Abdominal:      General: Bowel sounds are normal.      Palpations: Abdomen is soft.   Musculoskeletal:      Cervical back: Neck supple.      Right lower leg: No edema.      Left lower leg: No edema.   Neurological:      Mental Status: She is alert.      Motor: Weakness present.   Psychiatric:         Mood and Affect: Mood normal.         Assessment/Plan   Problem List Items Addressed This Visit       Essential hypertension, benign     Continue current medications   Monitor BP  BP stable         Weakness - Primary     Pt/ot          Closed fracture of right ankle, initial encounter     Pain meds  PT OT          Medications, treatments, and labs reviewed  Continue medications and treatments as listed in EMR      Scribe Attestation  Kevin FOSTER Scribe   attest that this documentation has been prepared under the direction and in the presence of Jesse Coronel DO    Provider Attestation - Scribe documentation  All medical record entries made by the Scribe were at my direction and personally dictated by me. I have reviewed the chart and agree that the record accurately reflects my personal performance of the history, physical exam, discussion and plan.   Jesse Coronel DO

## 2024-12-20 ENCOUNTER — TELEPHONE (OUTPATIENT)
Dept: PRIMARY CARE | Facility: CLINIC | Age: 54
End: 2024-12-20
Payer: COMMERCIAL

## 2024-12-20 NOTE — TELEPHONE ENCOUNTER
Pt states that she was on a BP med that was making her cough. She made an appt with another  And was given Valsartan and she states tht the BP med is making her BP high with an average BP of 156/96 and her Heart rate has been normal.     Please advise.

## 2025-01-29 ENCOUNTER — APPOINTMENT (OUTPATIENT)
Dept: PRIMARY CARE | Facility: CLINIC | Age: 55
End: 2025-01-29
Payer: COMMERCIAL

## 2025-02-18 NOTE — ANESTHESIA PROCEDURE NOTES
Health Maintenance       COVID-19 Vaccine (1)  Never done    Influenza Vaccine (1)  Order placed this encounter    Well Child Exam 24 Months (Once)  Due since 2/5/2025    Lead Blood/Venous (View Topic Details)  Due since 2/5/2025           Following review of the above:  Pended orders  Patient wishes to discuss with clinician: COVID-19 and Influenza  Patient is not proceeding with: COVID-19    Note: Refer to final orders and clinician documentation.       Airway  Date/Time: 9/15/2024 1:38 PM  Urgency: elective      Staffing  Performed: CRNA   Authorized by: Kulwant Monroy MD    Performed by: STEPHANIE Bermudez-ALICIA  Patient location during procedure: OR    Indications and Patient Condition  Indications for airway management: anesthesia  Spontaneous Ventilation: absent  Sedation level: deep  Preoxygenated: yes  Patient position: sniffing  Mask difficulty assessment: 1 - vent by mask    Final Airway Details  Final airway type: endotracheal airway      Successful airway: ETT  Cuffed: yes   Successful intubation technique: video laryngoscopy  Facilitating devices/methods: intubating stylet  Endotracheal tube insertion site: oral  Blade: Karen  Blade size: #4  ETT size (mm): 7.5  Cormack-Lehane Classification: grade I - full view of glottis  Placement verified by: chest auscultation and capnometry   Measured from: lips  ETT to lips (cm): 22  Number of attempts at approach: 1  Ventilation between attempts: none  Number of other approaches attempted: 0    Additional Comments  Elective Baez mac 4 utilized without difficulty

## 2025-02-26 ENCOUNTER — OFFICE VISIT (OUTPATIENT)
Dept: PRIMARY CARE | Facility: CLINIC | Age: 55
End: 2025-02-26
Payer: MEDICARE

## 2025-02-26 VITALS
TEMPERATURE: 97.7 F | HEART RATE: 90 BPM | BODY MASS INDEX: 53.92 KG/M2 | SYSTOLIC BLOOD PRESSURE: 148 MMHG | WEIGHT: 293 LBS | DIASTOLIC BLOOD PRESSURE: 102 MMHG | HEIGHT: 62 IN

## 2025-02-26 DIAGNOSIS — I10 PRIMARY HYPERTENSION: ICD-10-CM

## 2025-02-26 DIAGNOSIS — F51.01 PRIMARY INSOMNIA: ICD-10-CM

## 2025-02-26 DIAGNOSIS — I10 ESSENTIAL HYPERTENSION, BENIGN: Primary | ICD-10-CM

## 2025-02-26 PROCEDURE — 3077F SYST BP >= 140 MM HG: CPT | Performed by: FAMILY MEDICINE

## 2025-02-26 PROCEDURE — 3080F DIAST BP >= 90 MM HG: CPT | Performed by: FAMILY MEDICINE

## 2025-02-26 PROCEDURE — 1036F TOBACCO NON-USER: CPT | Performed by: FAMILY MEDICINE

## 2025-02-26 PROCEDURE — 99214 OFFICE O/P EST MOD 30 MIN: CPT | Performed by: FAMILY MEDICINE

## 2025-02-26 PROCEDURE — 3008F BODY MASS INDEX DOCD: CPT | Performed by: FAMILY MEDICINE

## 2025-02-26 RX ORDER — METFORMIN HYDROCHLORIDE 500 MG/1
TABLET ORAL
COMMUNITY
Start: 2024-01-15 | End: 2025-02-26 | Stop reason: WASHOUT

## 2025-02-26 RX ORDER — VALSARTAN 160 MG/1
160 TABLET ORAL DAILY
Qty: 30 TABLET | Refills: 2 | Status: SHIPPED | OUTPATIENT
Start: 2025-02-26 | End: 2025-05-27

## 2025-02-26 RX ORDER — CHLORTHALIDONE 25 MG/1
25 TABLET ORAL DAILY
Qty: 30 TABLET | Refills: 2 | Status: SHIPPED | OUTPATIENT
Start: 2025-02-26 | End: 2025-05-27

## 2025-02-26 RX ORDER — TRAZODONE HYDROCHLORIDE 50 MG/1
TABLET ORAL
Qty: 60 TABLET | Refills: 2 | Status: SHIPPED | OUTPATIENT
Start: 2025-02-26

## 2025-02-26 ASSESSMENT — PATIENT HEALTH QUESTIONNAIRE - PHQ9
SUM OF ALL RESPONSES TO PHQ9 QUESTIONS 1 AND 2: 0
1. LITTLE INTEREST OR PLEASURE IN DOING THINGS: NOT AT ALL
2. FEELING DOWN, DEPRESSED OR HOPELESS: NOT AT ALL
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
## 2025-03-10 ENCOUNTER — APPOINTMENT (OUTPATIENT)
Dept: PRIMARY CARE | Facility: CLINIC | Age: 55
End: 2025-03-10
Payer: COMMERCIAL

## 2025-04-16 ENCOUNTER — APPOINTMENT (OUTPATIENT)
Dept: PRIMARY CARE | Facility: CLINIC | Age: 55
End: 2025-04-16
Payer: MEDICARE

## 2025-04-16 PROBLEM — S82.61XD CLOSED DISPLACED FRACTURE OF LATERAL MALLEOLUS OF RIGHT FIBULA WITH ROUTINE HEALING: Status: ACTIVE | Noted: 2024-10-09

## 2025-04-16 PROBLEM — I11.9 HYPERTENSIVE HEART DISEASE WITHOUT HEART FAILURE: Status: ACTIVE | Noted: 2024-09-25

## 2025-04-16 PROBLEM — K21.9 GASTRO-ESOPHAGEAL REFLUX DISEASE WITHOUT ESOPHAGITIS: Status: ACTIVE | Noted: 2024-09-25

## 2025-04-16 PROBLEM — R60.0 LOCALIZED EDEMA: Status: ACTIVE | Noted: 2017-09-05

## 2025-04-16 PROBLEM — T84.9XXA: Status: ACTIVE | Noted: 2024-10-09

## 2025-04-16 PROBLEM — R26.2 DIFFICULTY IN WALKING, NOT ELSEWHERE CLASSIFIED: Status: ACTIVE | Noted: 2024-09-25

## 2025-04-16 PROBLEM — S82.431D: Status: ACTIVE | Noted: 2024-09-25

## 2025-04-16 PROBLEM — Z98.84 BARIATRIC SURGERY STATUS: Status: ACTIVE | Noted: 2024-09-25

## 2025-04-16 PROBLEM — S93.431A SPRAIN OF TIBIOFIBULAR LIGAMENT OF RIGHT ANKLE: Status: ACTIVE | Noted: 2024-10-09

## 2025-04-16 PROBLEM — E78.5 HYPERLIPIDEMIA, UNSPECIFIED: Status: ACTIVE | Noted: 2024-09-25

## 2025-06-05 DIAGNOSIS — I10 ESSENTIAL HYPERTENSION, BENIGN: ICD-10-CM

## 2025-06-05 DIAGNOSIS — I10 PRIMARY HYPERTENSION: ICD-10-CM

## 2025-06-06 RX ORDER — CHLORTHALIDONE 25 MG/1
25 TABLET ORAL DAILY
Qty: 30 TABLET | Refills: 1 | Status: SHIPPED | OUTPATIENT
Start: 2025-06-06 | End: 2025-09-04

## 2025-06-06 RX ORDER — VALSARTAN 160 MG/1
160 TABLET ORAL DAILY
Qty: 30 TABLET | Refills: 1 | Status: SHIPPED | OUTPATIENT
Start: 2025-06-06 | End: 2025-09-04

## 2025-08-08 DIAGNOSIS — I10 PRIMARY HYPERTENSION: ICD-10-CM

## 2025-08-09 DIAGNOSIS — I10 PRIMARY HYPERTENSION: ICD-10-CM

## 2025-08-09 DIAGNOSIS — I10 ESSENTIAL HYPERTENSION, BENIGN: ICD-10-CM

## 2025-08-10 RX ORDER — VALSARTAN 160 MG/1
160 TABLET ORAL DAILY
Qty: 30 TABLET | Refills: 1 | Status: SHIPPED | OUTPATIENT
Start: 2025-08-10 | End: 2025-11-08

## 2025-08-11 DIAGNOSIS — I10 ESSENTIAL HYPERTENSION, BENIGN: ICD-10-CM

## 2025-08-11 RX ORDER — CHLORTHALIDONE 25 MG/1
25 TABLET ORAL DAILY
Qty: 30 TABLET | Refills: 1 | Status: SHIPPED | OUTPATIENT
Start: 2025-08-11 | End: 2025-11-09

## 2025-08-13 RX ORDER — CHLORTHALIDONE 25 MG/1
25 TABLET ORAL DAILY
Qty: 30 TABLET | Refills: 1 | OUTPATIENT
Start: 2025-08-13

## 2025-08-13 RX ORDER — VALSARTAN 160 MG/1
160 TABLET ORAL DAILY
Qty: 30 TABLET | Refills: 1 | OUTPATIENT
Start: 2025-08-13

## 2025-08-20 DIAGNOSIS — I10 ESSENTIAL HYPERTENSION, BENIGN: ICD-10-CM

## 2025-08-20 DIAGNOSIS — I10 PRIMARY HYPERTENSION: ICD-10-CM

## 2025-08-22 ENCOUNTER — TELEPHONE (OUTPATIENT)
Dept: PRIMARY CARE | Facility: CLINIC | Age: 55
End: 2025-08-22
Payer: MEDICARE

## 2025-08-22 DIAGNOSIS — E55.9 VITAMIN D DEFICIENCY: Primary | ICD-10-CM

## 2025-08-22 DIAGNOSIS — E78.5 HYPERLIPIDEMIA, UNSPECIFIED HYPERLIPIDEMIA TYPE: ICD-10-CM

## 2025-08-22 DIAGNOSIS — I10 ESSENTIAL HYPERTENSION, BENIGN: ICD-10-CM

## 2025-08-22 DIAGNOSIS — D50.9 IRON DEFICIENCY ANEMIA, UNSPECIFIED IRON DEFICIENCY ANEMIA TYPE: ICD-10-CM

## 2025-08-22 DIAGNOSIS — E53.8 B12 DEFICIENCY: ICD-10-CM

## 2025-08-22 RX ORDER — CHLORTHALIDONE 25 MG/1
25 TABLET ORAL DAILY
Qty: 30 TABLET | Refills: 1 | Status: SHIPPED | OUTPATIENT
Start: 2025-08-22 | End: 2025-11-20

## 2025-08-22 RX ORDER — VALSARTAN 160 MG/1
160 TABLET ORAL DAILY
Qty: 30 TABLET | Refills: 1 | Status: SHIPPED | OUTPATIENT
Start: 2025-08-22 | End: 2025-11-20

## 2025-08-26 LAB
25(OH)D3+25(OH)D2 SERPL-MCNC: 64 NG/ML (ref 30–100)
ALBUMIN SERPL-MCNC: 3.9 G/DL (ref 3.6–5.1)
ALP SERPL-CCNC: 160 U/L (ref 37–153)
ALT SERPL-CCNC: 90 U/L (ref 6–29)
ANION GAP SERPL CALCULATED.4IONS-SCNC: 11 MMOL/L (CALC) (ref 7–17)
AST SERPL-CCNC: 73 U/L (ref 10–35)
BASOPHILS # BLD AUTO: 20 CELLS/UL (ref 0–200)
BASOPHILS NFR BLD AUTO: 0.3 %
BILIRUB SERPL-MCNC: 1 MG/DL (ref 0.2–1.2)
BUN SERPL-MCNC: 37 MG/DL (ref 7–25)
CALCIUM SERPL-MCNC: 8.7 MG/DL (ref 8.6–10.4)
CHLORIDE SERPL-SCNC: 99 MMOL/L (ref 98–110)
CHOLEST SERPL-MCNC: 101 MG/DL
CHOLEST/HDLC SERPL: 2.3 (CALC)
CO2 SERPL-SCNC: 26 MMOL/L (ref 20–32)
CREAT SERPL-MCNC: 1.05 MG/DL (ref 0.5–1.03)
EGFRCR SERPLBLD CKD-EPI 2021: 63 ML/MIN/1.73M2
EOSINOPHIL # BLD AUTO: 40 CELLS/UL (ref 15–500)
EOSINOPHIL NFR BLD AUTO: 0.6 %
ERYTHROCYTE [DISTWIDTH] IN BLOOD BY AUTOMATED COUNT: 18.6 % (ref 11–15)
EST. AVERAGE GLUCOSE BLD GHB EST-MCNC: 103 MG/DL
EST. AVERAGE GLUCOSE BLD GHB EST-SCNC: 5.7 MMOL/L
FERRITIN SERPL-MCNC: 6 NG/ML (ref 16–232)
FOLATE SERPL-MCNC: >24 NG/ML
GLUCOSE SERPL-MCNC: 121 MG/DL (ref 65–99)
HBA1C MFR BLD: 5.2 %
HCT VFR BLD AUTO: 15.8 % (ref 35–45)
HDLC SERPL-MCNC: 44 MG/DL
HGB BLD-MCNC: 4.1 G/DL (ref 11.7–15.5)
IRON SATN MFR SERPL: 3 % (CALC) (ref 16–45)
IRON SERPL-MCNC: 17 MCG/DL (ref 45–160)
LDLC SERPL CALC-MCNC: 43 MG/DL (CALC)
LYMPHOCYTES # BLD AUTO: 1173 CELLS/UL (ref 850–3900)
LYMPHOCYTES NFR BLD AUTO: 17.5 %
MCH RBC QN AUTO: 18.2 PG (ref 27–33)
MCHC RBC AUTO-ENTMCNC: 25.9 G/DL (ref 32–36)
MCV RBC AUTO: 70.2 FL (ref 80–100)
MONOCYTES # BLD AUTO: 710 CELLS/UL (ref 200–950)
MONOCYTES NFR BLD AUTO: 10.6 %
NEUTROPHILS # BLD AUTO: 4757 CELLS/UL (ref 1500–7800)
NEUTROPHILS NFR BLD AUTO: 71 %
NONHDLC SERPL-MCNC: 57 MG/DL (CALC)
PLATELET # BLD AUTO: 366 THOUSAND/UL (ref 140–400)
PMV BLD REES-ECKER: 9.9 FL (ref 7.5–12.5)
POTASSIUM SERPL-SCNC: 4.6 MMOL/L (ref 3.5–5.3)
PROT SERPL-MCNC: 6.4 G/DL (ref 6.1–8.1)
RBC # BLD AUTO: 2.25 MILLION/UL (ref 3.8–5.1)
SODIUM SERPL-SCNC: 136 MMOL/L (ref 135–146)
TIBC SERPL-MCNC: 544 MCG/DL (CALC) (ref 250–450)
TRIGL SERPL-MCNC: 62 MG/DL
TSH SERPL-ACNC: 1.99 MIU/L
VIT B12 SERPL-MCNC: >2000 PG/ML (ref 200–1100)
WBC # BLD AUTO: 6.7 THOUSAND/UL (ref 3.8–10.8)

## 2025-08-27 ENCOUNTER — APPOINTMENT (OUTPATIENT)
Dept: RADIOLOGY | Facility: CLINIC | Age: 55
End: 2025-08-27
Payer: MEDICARE

## 2025-08-28 ENCOUNTER — PATIENT OUTREACH (OUTPATIENT)
Dept: PRIMARY CARE | Facility: CLINIC | Age: 55
End: 2025-08-28
Payer: MEDICARE

## 2025-08-29 ENCOUNTER — TELEPHONE (OUTPATIENT)
Dept: PRIMARY CARE | Facility: CLINIC | Age: 55
End: 2025-08-29
Payer: MEDICARE

## 2025-09-02 ENCOUNTER — APPOINTMENT (OUTPATIENT)
Dept: PRIMARY CARE | Facility: CLINIC | Age: 55
End: 2025-09-02
Payer: MEDICARE

## 2025-09-02 LAB
NON-UH HIE DXH ACTIONS: ABNORMAL
NON-UH HIE HCT: 23.3 % (ref 36–46)
NON-UH HIE HGB: 7.2 G/DL (ref 12–16)

## 2025-09-04 ENCOUNTER — APPOINTMENT (OUTPATIENT)
Dept: PRIMARY CARE | Facility: CLINIC | Age: 55
End: 2025-09-04
Payer: MEDICARE

## 2025-09-10 ENCOUNTER — APPOINTMENT (OUTPATIENT)
Dept: RADIOLOGY | Facility: CLINIC | Age: 55
End: 2025-09-10
Payer: MEDICARE

## 2025-10-09 ENCOUNTER — APPOINTMENT (OUTPATIENT)
Dept: PRIMARY CARE | Facility: CLINIC | Age: 55
End: 2025-10-09
Payer: MEDICARE

## (undated) DEVICE — BANDAGE, GAUZE, CONFORMING, KERLIX, 6 PLY, 4.5 IN X 4.1 YD

## (undated) DEVICE — BIT, DRILL, QUICK-COUPLING, 3.5 X 110 MM, STAINLESS STEEL

## (undated) DEVICE — Device

## (undated) DEVICE — CUFF, TOURNIQUET, 44 X 4, SNGL PORT/SNGL BLADDER, DISP, LF

## (undated) DEVICE — BAG, BANDED, 36IN X 28IN

## (undated) DEVICE — BANDAGE, COFLEX, 6 X 5 YDS, FOAM TAN, STERILE, LF

## (undated) DEVICE — BANDAGE, ELASTIC, PREMIUM, SELF-CLOSE, 4 IN X 5.5 YD, STERILE

## (undated) DEVICE — DRAPE, TIBURON, SPLIT SHEET, REINF ADH STRIP, 77X108

## (undated) DEVICE — SCREW, CORTICAL, SELF-TAPPING, 3.5 X 22 MM, STAINLESS STEEL: Type: IMPLANTABLE DEVICE | Site: ANKLE | Status: NON-FUNCTIONAL

## (undated) DEVICE — MAT, AIR TRANSFER, 39X81

## (undated) DEVICE — OINTMENT, BACITRACIN, W/ZINC, 1 OZ

## (undated) DEVICE — DRESSING, GAUZE, SPONGE, 12 PLY, CURITY, 4 X 4 IN, RIGID TRAY, STERILE

## (undated) DEVICE — DRAPE, SHEET, U, W/ADHESIVE STRIP, IMPERVIOUS, 60 X 70 IN, DISPOSABLE, LF, STERILE

## (undated) DEVICE — BANDAGE, ESMARK, 6 IN X 9 FT, STERILE

## (undated) DEVICE — BIT, DRILL, QUICK-COUPLING, 2.5 X 110 MM, STAINLESS STEEL, GOLD

## (undated) DEVICE — DRAPE, U-DRAPE, NON STERILE

## (undated) DEVICE — PADDING, CAST, WYTEX, 4 IN X 4 YD, LF

## (undated) DEVICE — KWIRE, BUSA, .045 X 4IN, NO 2, STERILE

## (undated) DEVICE — SPONGE, LAP, XRAY DECT, 4IN X 18IN, W/MASTER DMT, STERILE

## (undated) DEVICE — DRAPE, SHEET, THREE QUARTER, FAN FOLD, 57 X 77 IN

## (undated) DEVICE — BANDAGE, ELASTIC, PREMIUM, SELF-CLOSE, 6 IN X 5.5 YD, STERILE